# Patient Record
Sex: FEMALE | Race: WHITE | Employment: OTHER | URBAN - METROPOLITAN AREA
[De-identification: names, ages, dates, MRNs, and addresses within clinical notes are randomized per-mention and may not be internally consistent; named-entity substitution may affect disease eponyms.]

---

## 2017-11-08 ENCOUNTER — HOSPITAL ENCOUNTER (OUTPATIENT)
Dept: MAMMOGRAPHY | Age: 77
Discharge: HOME | End: 2017-11-08
Attending: NURSE PRACTITIONER
Payer: MEDICARE

## 2017-11-08 DIAGNOSIS — T14.8XXA: ICD-10-CM

## 2017-11-08 PROCEDURE — 77080 DXA BONE DENSITY AXIAL: CPT

## 2018-03-14 ENCOUNTER — APPOINTMENT (OUTPATIENT)
Dept: CT IMAGING | Age: 78
End: 2018-03-14
Attending: EMERGENCY MEDICINE
Payer: MEDICARE

## 2018-03-14 ENCOUNTER — HOSPITAL ENCOUNTER (EMERGENCY)
Age: 78
Discharge: HOME | End: 2018-03-14
Attending: EMERGENCY MEDICINE
Payer: MEDICARE

## 2018-03-14 VITALS
TEMPERATURE: 98.6 F | HEART RATE: 80 BPM | SYSTOLIC BLOOD PRESSURE: 122 MMHG | OXYGEN SATURATION: 96 % | DIASTOLIC BLOOD PRESSURE: 59 MMHG

## 2018-03-14 DIAGNOSIS — I26.99 OTHER ACUTE PULMONARY EMBOLISM WITHOUT ACUTE COR PULMONALE (HCC): Primary | ICD-10-CM

## 2018-03-14 LAB
ALBUMIN SERPL-MCNC: 3.5 G/DL (ref 3.2–4.6)
ALBUMIN/GLOB SERPL: 1 {RATIO} (ref 1.2–3.5)
ALP SERPL-CCNC: 112 U/L (ref 50–136)
ALT SERPL-CCNC: 22 U/L (ref 12–65)
ANION GAP SERPL CALC-SCNC: 9 MMOL/L (ref 7–16)
AST SERPL-CCNC: 20 U/L (ref 15–37)
ATRIAL RATE: 77 BPM
BASOPHILS # BLD: 0 K/UL (ref 0–0.2)
BASOPHILS NFR BLD: 0 % (ref 0–2)
BILIRUB SERPL-MCNC: 0.3 MG/DL (ref 0.2–1.1)
BUN SERPL-MCNC: 12 MG/DL (ref 8–23)
CALCIUM SERPL-MCNC: 8.3 MG/DL (ref 8.3–10.4)
CALCULATED P AXIS, ECG09: 74 DEGREES
CALCULATED R AXIS, ECG10: 53 DEGREES
CALCULATED T AXIS, ECG11: 41 DEGREES
CHLORIDE SERPL-SCNC: 108 MMOL/L (ref 98–107)
CO2 SERPL-SCNC: 26 MMOL/L (ref 21–32)
CREAT SERPL-MCNC: 0.62 MG/DL (ref 0.6–1)
D DIMER PPP FEU-MCNC: 2.52 UG/ML(FEU)
DIAGNOSIS, 93000: NORMAL
DIFFERENTIAL METHOD BLD: ABNORMAL
EOSINOPHIL # BLD: 0.1 K/UL (ref 0–0.8)
EOSINOPHIL NFR BLD: 1 % (ref 0.5–7.8)
ERYTHROCYTE [DISTWIDTH] IN BLOOD BY AUTOMATED COUNT: 14.5 % (ref 11.9–14.6)
GLOBULIN SER CALC-MCNC: 3.6 G/DL (ref 2.3–3.5)
GLUCOSE SERPL-MCNC: 96 MG/DL (ref 65–100)
HCT VFR BLD AUTO: 42 % (ref 35.8–46.3)
HGB BLD-MCNC: 13.7 G/DL (ref 11.7–15.4)
IMM GRANULOCYTES # BLD: 0 K/UL (ref 0–0.5)
IMM GRANULOCYTES NFR BLD AUTO: 0 % (ref 0–5)
LYMPHOCYTES # BLD: 1.4 K/UL (ref 0.5–4.6)
LYMPHOCYTES NFR BLD: 16 % (ref 13–44)
MCH RBC QN AUTO: 29.1 PG (ref 26.1–32.9)
MCHC RBC AUTO-ENTMCNC: 32.6 G/DL (ref 31.4–35)
MCV RBC AUTO: 89.2 FL (ref 79.6–97.8)
MONOCYTES # BLD: 0.7 K/UL (ref 0.1–1.3)
MONOCYTES NFR BLD: 8 % (ref 4–12)
NEUTS SEG # BLD: 6.6 K/UL (ref 1.7–8.2)
NEUTS SEG NFR BLD: 75 % (ref 43–78)
P-R INTERVAL, ECG05: 168 MS
PLATELET # BLD AUTO: 284 K/UL (ref 150–450)
PMV BLD AUTO: 10.2 FL (ref 10.8–14.1)
POTASSIUM SERPL-SCNC: 3.8 MMOL/L (ref 3.5–5.1)
PROT SERPL-MCNC: 7.1 G/DL (ref 6.3–8.2)
Q-T INTERVAL, ECG07: 372 MS
QRS DURATION, ECG06: 74 MS
QTC CALCULATION (BEZET), ECG08: 420 MS
RBC # BLD AUTO: 4.71 M/UL (ref 4.05–5.25)
SODIUM SERPL-SCNC: 143 MMOL/L (ref 136–145)
TROPONIN I BLD-MCNC: 0 NG/ML (ref 0.02–0.05)
TROPONIN I SERPL-MCNC: <0.02 NG/ML (ref 0.02–0.05)
VENTRICULAR RATE, ECG03: 77 BPM
WBC # BLD AUTO: 8.7 K/UL (ref 4.3–11.1)

## 2018-03-14 PROCEDURE — 80053 COMPREHEN METABOLIC PANEL: CPT | Performed by: EMERGENCY MEDICINE

## 2018-03-14 PROCEDURE — 85025 COMPLETE CBC W/AUTO DIFF WBC: CPT | Performed by: EMERGENCY MEDICINE

## 2018-03-14 PROCEDURE — 84484 ASSAY OF TROPONIN QUANT: CPT | Performed by: EMERGENCY MEDICINE

## 2018-03-14 PROCEDURE — 93005 ELECTROCARDIOGRAM TRACING: CPT | Performed by: EMERGENCY MEDICINE

## 2018-03-14 PROCEDURE — 99283 EMERGENCY DEPT VISIT LOW MDM: CPT | Performed by: EMERGENCY MEDICINE

## 2018-03-14 PROCEDURE — 74011636320 HC RX REV CODE- 636/320: Performed by: EMERGENCY MEDICINE

## 2018-03-14 PROCEDURE — 71260 CT THORAX DX C+: CPT

## 2018-03-14 PROCEDURE — 85379 FIBRIN DEGRADATION QUANT: CPT | Performed by: EMERGENCY MEDICINE

## 2018-03-14 PROCEDURE — 74011000258 HC RX REV CODE- 258: Performed by: EMERGENCY MEDICINE

## 2018-03-14 RX ORDER — SODIUM CHLORIDE 0.9 % (FLUSH) 0.9 %
10 SYRINGE (ML) INJECTION
Status: COMPLETED | OUTPATIENT
Start: 2018-03-14 | End: 2018-03-14

## 2018-03-14 RX ADMIN — Medication 10 ML: at 15:34

## 2018-03-14 RX ADMIN — SODIUM CHLORIDE 100 ML: 900 INJECTION, SOLUTION INTRAVENOUS at 15:34

## 2018-03-14 RX ADMIN — IOPAMIDOL 100 ML: 755 INJECTION, SOLUTION INTRAVENOUS at 15:34

## 2018-03-14 NOTE — DISCHARGE INSTRUCTIONS
Pulmonary Embolism: Care Instructions  Your Care Instructions    Pulmonary embolism is the sudden blockage of an artery in the lung. Blood clots in the deep veins of the leg or pelvis (deep vein thrombosis, or DVT) are the most common cause. These blood clots can travel to the lungs. Pulmonary embolism can be very serious. Because you have had one pulmonary embolism, you are at greater risk for having another one. But you can take steps to prevent another pulmonary embolism by following your doctor's instructions. You will probably take a prescription blood-thinning medicine to prevent blood clots. A blood thinner can stop a blood clot from growing larger and prevent new clots from forming. Follow-up care is a key part of your treatment and safety. Be sure to make and go to all appointments, and call your doctor if you are having problems. It's also a good idea to know your test results and keep a list of the medicines you take. How can you care for yourself at home? · Take your medicines exactly as prescribed. Call your doctor if you think you are having a problem with your medicine. You will get more details on the specific medicines your doctor prescribes. · If you are taking a blood thinner, be sure you get instructions about how to take your medicine safely. Blood thinners can cause serious bleeding problems. Preventing future pulmonary embolisms  · Exercise. Keep blood moving in your legs to keep clots from forming. If you are traveling by car, stop every hour or so. Get out and walk around for a few minutes. If you are traveling by bus, train, or plane, get out of your seat and walk up and down the aisles every hour or so. You also can do leg exercises while you are seated. Pump your feet up and down by pulling your toes up toward your knees then pointing them down. · Get up out of bed as soon as possible after an illness or surgery. · Do not smoke.  If you need help quitting, talk to your doctor about stop-smoking programs and medicines. These can increase your chances of quitting for good. · Check with your doctor before taking hormone or birth control pills. These may increase your risk of blot clots. · Ask your doctor about wearing compression stockings to help prevent blood clots in your legs. There are different types of stockings, and they need to fit right. So your doctor will recommend what you need. When should you call for help? Call 911 anytime you think you may need emergency care. For example, call if:  ? · You have shortness of breath. ? · You have chest pain. ? · You passed out (lost consciousness). ? · You cough up blood. ?Call your doctor now or seek immediate medical care if:  ? · You have new or worsening pain or swelling in your leg. ? Watch closely for changes in your health, and be sure to contact your doctor if:  ? · You do not get better as expected. Where can you learn more? Go to http://vicky-enid.info/. Enter H165 in the search box to learn more about \"Pulmonary Embolism: Care Instructions. \"  Current as of: March 20, 2017  Content Version: 11.4  © 4799-0463 [employer]. Care instructions adapted under license by [employer] (which disclaims liability or warranty for this information). If you have questions about a medical condition or this instruction, always ask your healthcare professional. [address] any warranty or liability for your use of this information.

## 2018-03-14 NOTE — ED TRIAGE NOTES
Pt arrived ambulatory via POV with c/o chest pain when she breaths that started yesterday at 5pm. Pain is under L breast, does not radiate. Pt denies any other systems.

## 2018-03-14 NOTE — ED PROVIDER NOTES
HPI Comments: 68-year-old female with history of sick sinus syndrome and pacemaker placement presents with left-sided chest pain since 5:30 PM last night. She states pain woke her up several times throughout the night. It improved after taking Motrin. Pain is constant with intermittent worsening. It has improved this morning. She denies shortness of breath, cough, fever, hemoptysis. No leg swelling or history of clots. No history of MI or stents. Pain worse with breathing. No change in pain with movement or palpation. Has had similar pain in the past and told it was due to spasms in her chest wall muscles. Went to [facility] for this this morning was told to come to the emergency department for further evaluation. Patient is a 68 y.o. female presenting with chest pain. The history is provided by the patient. Chest Pain (Angina)    Pertinent negatives include no abdominal pain, no back pain, no cough, no fever, no headaches, no nausea, no palpitations, no shortness of breath, no vomiting and no weakness.         Past Medical History:   Diagnosis Date    Atrial fibrillation ([address] 12/2/2014    Back pain 12/15/2014    CAD (coronary artery disease)     a-fib    Cardiac pacemaker 12/15/2014    Hypothyroidism     was previously on medication but it has been stopped    Insomnia 12/2/2014    Malaise and fatigue 12/21/2015    Oral thrush 9/16/2015    Pericardial effusion 12/15/2014    Pleuritic chest pain 1/8/2015    SSS (sick sinus syndrome) ([address] 12/21/2015       Past Surgical History:   Procedure Laterality Date    HX APPENDECTOMY      HX COLONOSCOPY  04/23/2007    HX HYSTERECTOMY      HX PACEMAKER  12/15/2014    HX PACEMAKER PLACEMENT  12/2014         Family History:   Problem Relation Age of Onset    Cancer Mother      leukemia    Heart Disease Father     Stroke Father     Cancer Brother      prostate       Social History     Social History    Marital status:      Spouse name: N/A  Number of children: N/A    Years of education: N/A     Occupational History    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 Lives with . 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 ALLERGIES: Review of patient's allergies indicates no known allergies. Review of Systems   Constitutional: Negative for chills and fever. HENT: Negative for hearing loss. Eyes: Negative for visual disturbance. Respiratory: Negative for cough and shortness of breath. Cardiovascular: Positive for chest pain. Negative for palpitations and leg swelling. Gastrointestinal: Negative for abdominal pain, diarrhea, nausea and vomiting. Musculoskeletal: Negative for back pain. Skin: Negative for rash. Neurological: Negative for weakness and headaches. Psychiatric/Behavioral: Negative for confusion. There were no vitals filed for this visit. Physical Exam   Constitutional: She appears well-developed and well-nourished. HENT:   Head: Normocephalic and atraumatic. Right Ear: External ear normal.   Left Ear: External ear normal.   Nose: Nose normal.   Mouth/Throat: Oropharynx is clear and moist.   Eyes: Conjunctivae are normal. Pupils are equal, round, and reactive to light. Neck: Normal range of motion. Neck supple. Cardiovascular: Regular rhythm, normal heart sounds and intact distal pulses. Pulmonary/Chest: Effort normal and breath sounds normal. No respiratory distress. She has no wheezes. She exhibits no tenderness. Abdominal: Soft.  Bowel sounds are normal. She exhibits no distension. There is no tenderness. Musculoskeletal: Normal range of motion. She exhibits no edema. Neurological: She is alert. Skin: Skin is warm and dry. Psychiatric: Judgment normal.   Nursing note and vitals reviewed. MDM  Number of Diagnoses or Management Options  Diagnosis management comments: Parts of this document were created using dragon voice recognition software. The chart has been reviewed but errors may still be present. cxr from MD Fonseca reviewed. No acute findings. 2:45 PM  D dimer elevated. CTA pending.        Amount and/or Complexity of Data Reviewed  Clinical lab tests: ordered and reviewed (Results for orders placed or performed during the hospital encounter of 03/14/18  -CBC WITH AUTOMATED DIFF       Result                                            Value                         Ref Range                       WBC                                               8.7                           4.3 - 11.1 K/uL                 RBC                                               4.71                          4.05 - 5.25 M/uL                HGB                                               13.7                          11.7 - 15.4 g/dL                HCT                                               42.0                          35.8 - 46.3 %                   MCV                                               89.2                          79.6 - 97.8 FL                  MCH                                               29.1                          26.1 - 32.9 PG                  MCHC                                              32.6                          31.4 - 35.0 g/dL                RDW                                               14.5                          11.9 - 14.6 %                   PLATELET                                          284                           150 - 450 K/uL                  MPV                                               10.2 (L) 10.8 - 14.1 FL                  DF                                                AUTOMATED                                                     NEUTROPHILS                                       75                            43 - 78 %                       LYMPHOCYTES                                       16                            13 - 44 %                       MONOCYTES                                         8                             4.0 - 12.0 %                    EOSINOPHILS                                       1                             0.5 - 7.8 %                     BASOPHILS                                         0                             0.0 - 2.0 %                     IMMATURE GRANULOCYTES                             0                             0.0 - 5.0 %                     ABS. NEUTROPHILS                                  6.6                           1.7 - 8.2 K/UL                  ABS. LYMPHOCYTES                                  1.4                           0.5 - 4.6 K/UL                  ABS. MONOCYTES                                    0.7                           0.1 - 1.3 K/UL                  ABS. EOSINOPHILS                                  0.1                           0.0 - 0.8 K/UL                  ABS. BASOPHILS                                    0.0                           0.0 - 0.2 K/UL                  ABS. IMM.  GRANS.                                  0.0                           0.0 - 0.5 K/UL             -METABOLIC PANEL, COMPREHENSIVE       Result                                            Value                         Ref Range                       Sodium                                            143                           136 - 145 mmol/L                Potassium                                         3.8                           3.5 - 5.1 mmol/L                Chloride                                          108 (H)                       98 - 107 mmol/L                 CO2                                               26                            21 - 32 mmol/L                  Anion gap                                         9                             7 - 16 mmol/L                   Glucose                                           96                            65 - 100 mg/dL                  BUN                                               12                            8 - 23 MG/DL                    Creatinine                                        0.62                          0.6 - 1.0 MG/DL                 GFR est AA                                        >60                           >60 ml/min/1.73m2               GFR est non-AA                                    >60                           >60 ml/min/1.73m2               Calcium                                           8.3                           8.3 - 10.4 MG/DL                Bilirubin, total                                  0.3                           0.2 - 1.1 MG/DL                 ALT (SGPT)                                        22                            12 - 65 U/L                     AST (SGOT)                                        20                            15 - 37 U/L                     Alk. phosphatase                                  112                           50 - 136 U/L                    Protein, total                                    7.1                           6.3 - 8.2 g/dL                  Albumin                                           3.5                           3.2 - 4.6 g/dL                  Globulin                                          3.6 (H)                       2.3 - 3.5 g/dL                  A-G Ratio                                         1.0 (L)                       1.2 - 3.5                  -TROPONIN I       Result                                            Value                         Ref Range Troponin-I, Qt.                                   <0.02 (L)                     0.02 - 0.05 NG/ML          -D DIMER       Result                                            Value                         Ref Range                       D DIMER                                           2.52 (HH)                     <0.56 ug/ml(FEU)           -EKG, 12 LEAD, INITIAL       Result                                            Value                         Ref Range                       Ventricular Rate                                  77                            BPM                             Atrial Rate                                       77                            BPM                             P-R Interval                                      168                           ms                              QRS Duration                                      74                            ms                              Q-T Interval                                      372                           ms                              QTC Calculation (Bezet)                           420                           ms                              Calculated P Axis                                 74                            degrees                         Calculated R Axis                                 53                            degrees                         Calculated T Axis                                 41                            degrees                         Diagnosis                                                                                                   Normal sinus rhythm   Cannot rule out Anterior infarct , age undetermined   Abnormal ECG   When compared with ECG of 31-DEC-2014 00:32,   Nonspecific T wave abnormality no longer evident in Anterolateral leads   Confirmed by Osei Rojo MD (), [facility] ([id number]) on 3/14/2018 12:35:15 PM     )  Tests in the radiology section of CPT®: reviewed and ordered          ED Course       Procedures      Transfer patient care change of shift, this is a 49-year-old female patient complaining of pleuritic type chest pain. Her initial laboratory evaluation EKG and chest x-ray were unremarkable however her d-dimer level was elevated. I then asked to follow-up results of patient's CTA of the chest and repeat troponin. Per outgoing provider, low suspicion for cardiac abnormality as a cause of patient's pain. She is has experienced similar symptoms in the past secondary to musculoskeletal cause per report.

## 2018-03-16 PROBLEM — J47.9 BRONCHIECTASIS (HCC): Status: ACTIVE | Noted: 2018-03-16

## 2018-03-16 PROBLEM — I82.90 VTE (VENOUS THROMBOEMBOLISM): Status: ACTIVE | Noted: 2018-03-16

## 2018-03-23 ENCOUNTER — HOSPITAL ENCOUNTER (OUTPATIENT)
Age: 78
Setting detail: OUTPATIENT SURGERY
Discharge: HOME | End: 2018-03-23
Attending: INTERNAL MEDICINE | Admitting: INTERNAL MEDICINE
Payer: MEDICARE

## 2018-03-23 VITALS
DIASTOLIC BLOOD PRESSURE: 56 MMHG | HEART RATE: 87 BPM | RESPIRATION RATE: 18 BRPM | WEIGHT: 100 LBS | HEIGHT: 60 IN | BODY MASS INDEX: 19.63 KG/M2 | TEMPERATURE: 97.8 F | SYSTOLIC BLOOD PRESSURE: 112 MMHG | OXYGEN SATURATION: 94 %

## 2018-03-23 DIAGNOSIS — J47.0 BRONCHIECTASIS WITH ACUTE LOWER RESPIRATORY INFECTION (HCC): ICD-10-CM

## 2018-03-23 LAB
APPEARANCE FLD: NORMAL
COLOR FLD: NORMAL
LYMPHOCYTES NFR FLD: 10 %
MONOS+MACROS NFR FLD: 2 %
NEUTROPHILS NFR FLD: 88 %
NUC CELL # FLD: 424 /CU MM
RBC # FLD: NORMAL /CU MM
SPECIMEN SOURCE FLD: NORMAL

## 2018-03-23 PROCEDURE — 88112 CYTOPATH CELL ENHANCE TECH: CPT | Performed by: INTERNAL MEDICINE

## 2018-03-23 PROCEDURE — 74011250636 HC RX REV CODE- 250/636: Performed by: INTERNAL MEDICINE

## 2018-03-23 PROCEDURE — 88305 TISSUE EXAM BY PATHOLOGIST: CPT | Performed by: INTERNAL MEDICINE

## 2018-03-23 PROCEDURE — 87486 CHLMYD PNEUM DNA AMP PROBE: CPT | Performed by: INTERNAL MEDICINE

## 2018-03-23 PROCEDURE — 31645 BRNCHSC W/THER ASPIR 1ST: CPT | Performed by: INTERNAL MEDICINE

## 2018-03-23 PROCEDURE — 76040000025: Performed by: INTERNAL MEDICINE

## 2018-03-23 PROCEDURE — 87633 RESP VIRUS 12-25 TARGETS: CPT | Performed by: INTERNAL MEDICINE

## 2018-03-23 PROCEDURE — 87116 MYCOBACTERIA CULTURE: CPT | Performed by: INTERNAL MEDICINE

## 2018-03-23 PROCEDURE — 87102 FUNGUS ISOLATION CULTURE: CPT | Performed by: INTERNAL MEDICINE

## 2018-03-23 PROCEDURE — 87070 CULTURE OTHR SPECIMN AEROBIC: CPT | Performed by: INTERNAL MEDICINE

## 2018-03-23 PROCEDURE — 74011000250 HC RX REV CODE- 250: Performed by: INTERNAL MEDICINE

## 2018-03-23 PROCEDURE — 89050 BODY FLUID CELL COUNT: CPT | Performed by: INTERNAL MEDICINE

## 2018-03-23 PROCEDURE — 74011250636 HC RX REV CODE- 250/636

## 2018-03-23 PROCEDURE — 77030012699 HC VLV SUC CNTRL OCOA -A: Performed by: INTERNAL MEDICINE

## 2018-03-23 RX ORDER — NALOXONE HYDROCHLORIDE 0.4 MG/ML
0.4 INJECTION, SOLUTION INTRAMUSCULAR; INTRAVENOUS; SUBCUTANEOUS
Status: DISCONTINUED | OUTPATIENT
Start: 2018-03-23 | End: 2018-03-23 | Stop reason: HOSPADM

## 2018-03-23 RX ORDER — MIDAZOLAM HYDROCHLORIDE 1 MG/ML
.25-5 INJECTION, SOLUTION INTRAMUSCULAR; INTRAVENOUS
Status: DISCONTINUED | OUTPATIENT
Start: 2018-03-23 | End: 2018-03-23 | Stop reason: HOSPADM

## 2018-03-23 RX ORDER — SODIUM CHLORIDE 0.9 % (FLUSH) 0.9 %
5-10 SYRINGE (ML) INJECTION AS NEEDED
Status: DISCONTINUED | OUTPATIENT
Start: 2018-03-23 | End: 2018-03-23 | Stop reason: HOSPADM

## 2018-03-23 RX ORDER — LIDOCAINE HYDROCHLORIDE 40 MG/ML
SOLUTION TOPICAL AS NEEDED
Status: DISCONTINUED | OUTPATIENT
Start: 2018-03-23 | End: 2018-03-23 | Stop reason: HOSPADM

## 2018-03-23 RX ORDER — FENTANYL CITRATE 50 UG/ML
50 INJECTION, SOLUTION INTRAMUSCULAR; INTRAVENOUS
Status: DISCONTINUED | OUTPATIENT
Start: 2018-03-23 | End: 2018-03-23 | Stop reason: HOSPADM

## 2018-03-23 RX ORDER — DIPHENHYDRAMINE HYDROCHLORIDE 50 MG/ML
25-50 INJECTION, SOLUTION INTRAMUSCULAR; INTRAVENOUS
Status: DISCONTINUED | OUTPATIENT
Start: 2018-03-23 | End: 2018-03-23 | Stop reason: HOSPADM

## 2018-03-23 RX ORDER — SODIUM CHLORIDE 9 MG/ML
50 INJECTION, SOLUTION INTRAVENOUS CONTINUOUS
Status: DISCONTINUED | OUTPATIENT
Start: 2018-03-23 | End: 2018-03-23 | Stop reason: HOSPADM

## 2018-03-23 RX ORDER — FLUMAZENIL 0.1 MG/ML
0.2 INJECTION INTRAVENOUS
Status: DISCONTINUED | OUTPATIENT
Start: 2018-03-23 | End: 2018-03-23 | Stop reason: HOSPADM

## 2018-03-23 RX ORDER — SODIUM CHLORIDE 0.9 % (FLUSH) 0.9 %
5-10 SYRINGE (ML) INJECTION EVERY 8 HOURS
Status: DISCONTINUED | OUTPATIENT
Start: 2018-03-23 | End: 2018-03-23 | Stop reason: HOSPADM

## 2018-03-23 RX ORDER — LIDOCAINE HYDROCHLORIDE 20 MG/ML
JELLY TOPICAL AS NEEDED
Status: DISCONTINUED | OUTPATIENT
Start: 2018-03-23 | End: 2018-03-23 | Stop reason: HOSPADM

## 2018-03-23 RX ADMIN — LIDOCAINE HYDROCHLORIDE: 40 SOLUTION TOPICAL at 12:29

## 2018-03-23 RX ADMIN — MIDAZOLAM HYDROCHLORIDE 2 MG: 1 INJECTION, SOLUTION INTRAMUSCULAR; INTRAVENOUS at 12:38

## 2018-03-23 RX ADMIN — LIDOCAINE HYDROCHLORIDE: 20 JELLY TOPICAL at 12:29

## 2018-03-23 RX ADMIN — FENTANYL CITRATE 50 MCG: 50 INJECTION, SOLUTION INTRAMUSCULAR; INTRAVENOUS at 12:38

## 2018-03-23 RX ADMIN — FENTANYL CITRATE 50 MCG: 50 INJECTION, SOLUTION INTRAMUSCULAR; INTRAVENOUS at 12:40

## 2018-03-23 RX ADMIN — SODIUM CHLORIDE 50 ML/HR: 900 INJECTION, SOLUTION INTRAVENOUS at 12:29

## 2018-03-23 NOTE — H&P
Date of Surgery Update:  Montana Araiza was seen and examined. History and physical has been reviewed. The patient has been examined.  There have been no significant clinical changes since the completion of the originally dated History and Physical.    Signed By: Hao Weiss MD     March 23, 2018 12:46 PM

## 2018-03-23 NOTE — DISCHARGE INSTRUCTIONS
RESPIRATORY CARE - BRONCHOSCOPY - DISCHARGE INSTRUCTIONS      You received a lot of numbing medication for your throat and nose, and you also received medication to make you sleepy during your procedure. Because of this and because the bronchoscopy may have irritated your airways, we ask that you follow these directions:    1. Do not eat or drink until 1:42 p.m. After that, you may have what you please. You may want to try some liquids first, because your throat may be a little sore. 2. Medication may cause drowsiness for several hours, therefore, do not drive or                  operate machinery for remainder of the day. No alcohol today. 3. You may cough up more mucus than usual and you may see some blood, but                   this is expected and should subside by the following day. 4. If severe throat irritation, coughing, or bleeding continue, call your doctor. 5.         If you run a fever greater than 102, call [facility] at [phone].       Instructions given to Michelle Arvizu and other family

## 2018-03-23 NOTE — PROCEDURES
PROCEDURE    Bronchoscopy with airway inspection/cleanout/BAL. TBBX/EBBX. INDICATION   bronchiectasis    EQUIPMENT:    Olympus Q 180 Bronchhoscope. ANESTHESIA    Concious sedation with: Fentanyl 100  mcg IV; Versed 2  mg IV; Lidocaine 120 mg to tracheo-bronchial tree and vocal cords;     AIRWAY INSPECTION    After obtaining informed consent, using a bite block/ET tube adapter, an Olympus q 180 video/fiberoptic bronchoscope was  introduced into the trachea through the vocal chords/ET tube, without complication. RIGHT    LOCATION NORM/ABNORM DESCRIPTION   VOCAL CORDS NL    TRACHEA NL    KIESHA NL    RMSB     RUL  Diffuse acute inflammation with edema, erythema and increased friability. BI     RML  arge amount of purulent secretions removed   SUP SEGM RLL     MED BASAL     ANTERIOR BASAL  Bal rml with 120 ml saline sent to lab   LATERAL BASAL     POSTERIOR BASAL                                               LEFT    LOCATION NORMAL/ABNORMAL TYPE   LMSB     SYLVIE     LINGULA     SUPERIOR DIVISION     SUPERIOR SEG LLL     OLGA-MEDIAL LLL     LATERAL LLL     POSTERIOR LLL       The following samples were obtained:    BAL:  Bronchial Wash:    Samples were sent for:  Cultures, cell count, cytology  The procedure was completed  without complication and the patient tolerated the procedure well.   Estimated blood loss-  0 to minimum  Recommendations:  Call next week for results  Rachel Vasquez MD

## 2018-03-23 NOTE — IP AVS SNAPSHOT
Sri Noble 
 
 
 [address] [facility] 
[phone] Patient: Addison Harkins MRN: [id number] NZO:[id number] About your hospitalization You were admitted on:  March 23, 2018 You last received care in the:  [facility] You were discharged on:  March 23, 2018 Why you were hospitalized Your primary diagnosis was:  Not on File Follow-up Information None Your Scheduled Appointments Tuesday April 10, 2018  4:00 PM EDT  
(Arrive by 3:40 PM) Return appointment with Kody Webber MD  
[facility] Pulmonary and Critical Care ([facility]) [address] [facility]  
[phone] Wednesday April 11, 2018  9:15 AM EDT  
REMOTE DEVICE CHECK ORDERS ONLY ENCOUNTER with JAMAL REMOTE PACER [address] [facility] [email or web address]] CARDIOLOGY [place] OFFICE ([address] [place] [address]) 2 [facility] 
[address] [facility] [address]  
[phone] Please remember THIS REMOTE CHECK IS COMPLETED FROM HOME - YOU WILL NOT COME TO THE OFFICE FOR THIS APPOINTMENT. In preparation for this check, please ensure your monitor box is working appropriately. If your monitor requires you to send a transmission, please make sure it is sent by 11:00AM on this day so we can have it processed and resulted to your doctor without delay. If you have a question or problem with the monitor box, please contact your respective company:   [address] [place] [address]/[employer] - [phone]  [employer]/Home [employer] - [phone]  [employer] - [phone]  [employer] - [phone]  If you have any further questions or need to move this appointment, we are happy to help and can be reached at [phone]. Discharge Orders None A check alexus indicates which time of day the medication should be taken. My Medications ASK your doctor about these medications Instructions Each Dose to Equal  
 Morning Noon Evening Bedtime  
 rivaroxaban 15 mg Tab tablet Commonly known as:  Gerrymariola Kee Your last dose was: Your next dose is:    
   
   
 1 tab bid Discharge Instructions RESPIRATORY CARE - BRONCHOSCOPY - DISCHARGE INSTRUCTIONS You received a lot of numbing medication for your throat and nose, and you also received medication to make you sleepy during your procedure. Because of this and because the bronchoscopy may have irritated your airways, we ask that you follow these directions: 1. Do not eat or drink until 1:42 p.m. After that, you may have what you please. You may want to try some liquids first, because your throat may be a little sore. 2. Medication may cause drowsiness for several hours, therefore, do not drive or                  operate machinery for remainder of the day. No alcohol today. 3. You may cough up more mucus than usual and you may see some blood, but                   this is expected and should subside by the following day. 4. If severe throat irritation, coughing, or bleeding continue, call your doctor. 5.         If you run a fever greater than 102, call [facility] at [phone]. Instructions given to Ángel Dumont and other family [facility]! Dear Nu Florian: Thank you for requesting a [email or web address] account. Our records indicate that you already have an active [email or web address] account. You can access your account anytime at [email or web address] Did you know that you can access your hospital and ER discharge instructions at any time in [email or web address]? You can also review all of your test results from your hospital stay or ER visit. Additional Information If you have questions, please visit the Frequently Asked Questions section of the [email or web address] website at [email or web address] Remember, MyChart is NOT to be used for urgent needs. For medical emergencies, dial 911. Now available from your iPhone and Android! Providers Seen During Your Hospitalization Provider Specialty Primary office phone Jenny Tate MD Pulmonary Disease [phone] Your Primary Care Physician (PCP) Primary Care Physician Office Phone Office Fax [address], [address] [place] [address] [phone] You are allergic to the following No active allergies Recent Documentation Height Weight BMI OB Status Smoking Status 1.524 m 45.4 kg 19.53 kg/m2 Hysterectomy Never Smoker Emergency Contacts Name Discharge Info Relation Home Work Mobile TeodoraRoger DISCHARGE CAREGIVER [3] Spouse [3] [phone] Patient Belongings The following personal items are in your possession at time of discharge: 
  Dental Appliances: None  Visual Aid: None Please provide this summary of care documentation to your next provider. Signatures-by signing, you are acknowledging that this After Visit Summary has been reviewed with you and you have received a copy. Patient Signature:  ____________________________________________________________ Date:  ____________________________________________________________  
  
Marlyse Leaks Provider Signature:  ____________________________________________________________ Date:  ____________________________________________________________

## 2018-03-25 LAB
BACTERIA SPEC CULT: NORMAL
GRAM STN SPEC: NORMAL
SERVICE CMNT-IMP: NORMAL

## 2018-03-26 LAB
BACTERIA SPEC CULT: ABNORMAL
BACTERIA SPEC CULT: ABNORMAL
FLUAV RNA SPEC QL NAA+PROBE: NEGATIVE
FLUBV RNA SPEC QL NAA+PROBE: NEGATIVE
GRAM STN SPEC: ABNORMAL
HADV DNA SPEC QL NAA+PROBE: NEGATIVE
HMPV RNA SPEC QL NAA+PROBE: NEGATIVE
HPIV1 RNA SPEC QL NAA+PROBE: NEGATIVE
HPIV2 RNA SPEC QL NAA+PROBE: NEGATIVE
HPIV3 RNA SPEC QL NAA+PROBE: NEGATIVE
RHINOVIRUS RNA SPEC QL NAA+PROBE: NEGATIVE
RSV A RNA SPEC QL NAA+PROBE: NEGATIVE
RSV B RNA SPEC QL NAA+PROBE: NEGATIVE
SERVICE CMNT-IMP: ABNORMAL
SPECIMEN SOURCE: NORMAL

## 2018-03-26 NOTE — PROGRESS NOTES
I informed the patient. The patient has difficulty with \"large pills\".  I informed Dr. Xavier Ortiz and he changed the medicine to:  cefUROXime (CEFTIN) 125 mg/5 mL suspension   250 mg solution bid for 10 days

## 2018-03-27 LAB
C. PNEUMONAIE, CPPT: NOT DETECTED
M. PNEUMONIAE, MPPT: NOT DETECTED

## 2018-04-20 LAB
FUNGUS CULTURE, RFCO2T: NEGATIVE
FUNGUS SMEAR, RFCO1T: NORMAL
FUNGUS SPEC CULT: NORMAL
FUNGUS STAIN, 188244: NORMAL
REFLEX TO ID, RFCO3T: NORMAL
SPECIMEN SOURCE: NORMAL
SPECIMEN SOURCE: NORMAL

## 2018-05-07 LAB
ACID FAST STN SPEC: NEGATIVE
ACID FAST STN SPEC: NEGATIVE
MYCOBACTERIUM SPEC QL CULT: NEGATIVE
MYCOBACTERIUM SPEC QL CULT: NEGATIVE
SPECIMEN PREPARATION: NORMAL
SPECIMEN PREPARATION: NORMAL
SPECIMEN SOURCE: NORMAL
SPECIMEN SOURCE: NORMAL

## 2020-01-13 PROBLEM — R00.1 BRADYCARDIA: Status: ACTIVE | Noted: 2020-01-13

## 2020-07-14 ENCOUNTER — APPOINTMENT (OUTPATIENT)
Dept: GENERAL RADIOLOGY | Age: 80
DRG: 310 | End: 2020-07-14
Attending: EMERGENCY MEDICINE
Payer: MEDICARE

## 2020-07-14 ENCOUNTER — HOSPITAL ENCOUNTER (INPATIENT)
Age: 80
LOS: 1 days | Discharge: HOME | DRG: 310 | End: 2020-07-15
Attending: EMERGENCY MEDICINE | Admitting: INTERNAL MEDICINE
Payer: MEDICARE

## 2020-07-14 DIAGNOSIS — I48.0 PAROXYSMAL ATRIAL FIBRILLATION WITH RVR (HCC): Primary | ICD-10-CM

## 2020-07-14 PROBLEM — R00.2 PALPITATIONS: Status: ACTIVE | Noted: 2020-07-14

## 2020-07-14 PROBLEM — E03.9 ACQUIRED HYPOTHYROIDISM: Status: ACTIVE | Noted: 2020-07-14

## 2020-07-14 PROBLEM — I48.91 ATRIAL FIBRILLATION WITH RVR (HCC): Status: ACTIVE | Noted: 2020-07-14

## 2020-07-14 LAB
ALBUMIN SERPL-MCNC: 3.4 G/DL (ref 3.2–4.6)
ALBUMIN/GLOB SERPL: 1.1 {RATIO} (ref 1.2–3.5)
ALP SERPL-CCNC: 99 U/L (ref 50–136)
ALT SERPL-CCNC: 22 U/L (ref 12–65)
ANION GAP SERPL CALC-SCNC: 6 MMOL/L (ref 7–16)
AST SERPL-CCNC: 17 U/L (ref 15–37)
ATRIAL RATE: 202 BPM
ATRIAL RATE: 300 BPM
BASOPHILS # BLD: 0 K/UL (ref 0–0.2)
BASOPHILS NFR BLD: 1 % (ref 0–2)
BILIRUB SERPL-MCNC: 0.3 MG/DL (ref 0.2–1.1)
BUN SERPL-MCNC: 12 MG/DL (ref 8–23)
CALCIUM SERPL-MCNC: 8.2 MG/DL (ref 8.3–10.4)
CALCULATED R AXIS, ECG10: 81 DEGREES
CALCULATED R AXIS, ECG10: 83 DEGREES
CALCULATED T AXIS, ECG11: 23 DEGREES
CALCULATED T AXIS, ECG11: 44 DEGREES
CHLORIDE SERPL-SCNC: 115 MMOL/L (ref 98–107)
CO2 SERPL-SCNC: 24 MMOL/L (ref 21–32)
CREAT SERPL-MCNC: 0.66 MG/DL (ref 0.6–1)
DIAGNOSIS, 93000: NORMAL
DIAGNOSIS, 93000: NORMAL
DIFFERENTIAL METHOD BLD: ABNORMAL
EOSINOPHIL # BLD: 0.1 K/UL (ref 0–0.8)
EOSINOPHIL NFR BLD: 2 % (ref 0.5–7.8)
ERYTHROCYTE [DISTWIDTH] IN BLOOD BY AUTOMATED COUNT: 14.3 % (ref 11.9–14.6)
GLOBULIN SER CALC-MCNC: 3.2 G/DL (ref 2.3–3.5)
GLUCOSE SERPL-MCNC: 115 MG/DL (ref 65–100)
HCT VFR BLD AUTO: 41.8 % (ref 35.8–46.3)
HGB BLD-MCNC: 12.9 G/DL (ref 11.7–15.4)
IMM GRANULOCYTES # BLD AUTO: 0 K/UL (ref 0–0.5)
IMM GRANULOCYTES NFR BLD AUTO: 0 % (ref 0–5)
LYMPHOCYTES # BLD: 1.6 K/UL (ref 0.5–4.6)
LYMPHOCYTES NFR BLD: 28 % (ref 13–44)
MAGNESIUM SERPL-MCNC: 2.2 MG/DL (ref 1.8–2.4)
MCH RBC QN AUTO: 27.7 PG (ref 26.1–32.9)
MCHC RBC AUTO-ENTMCNC: 30.9 G/DL (ref 31.4–35)
MCV RBC AUTO: 89.7 FL (ref 79.6–97.8)
MONOCYTES # BLD: 0.7 K/UL (ref 0.1–1.3)
MONOCYTES NFR BLD: 12 % (ref 4–12)
NEUTS SEG # BLD: 3.2 K/UL (ref 1.7–8.2)
NEUTS SEG NFR BLD: 57 % (ref 43–78)
NRBC # BLD: 0 K/UL (ref 0–0.2)
PLATELET # BLD AUTO: 298 K/UL (ref 150–450)
PMV BLD AUTO: 10.5 FL (ref 9.4–12.3)
POTASSIUM SERPL-SCNC: 4.2 MMOL/L (ref 3.5–5.1)
PROT SERPL-MCNC: 6.6 G/DL (ref 6.3–8.2)
Q-T INTERVAL, ECG07: 260 MS
Q-T INTERVAL, ECG07: 318 MS
QRS DURATION, ECG06: 70 MS
QRS DURATION, ECG06: 72 MS
QTC CALCULATION (BEZET), ECG08: 386 MS
QTC CALCULATION (BEZET), ECG08: 399 MS
RBC # BLD AUTO: 4.66 M/UL (ref 4.05–5.2)
SODIUM SERPL-SCNC: 145 MMOL/L (ref 136–145)
T4 FREE SERPL-MCNC: 1 NG/DL (ref 0.78–1.46)
TROPONIN-HIGH SENSITIVITY: 8.7 PG/ML (ref 0–14)
TSH SERPL DL<=0.005 MIU/L-ACNC: 3.36 UIU/ML (ref 0.36–3.74)
VENTRICULAR RATE, ECG03: 142 BPM
VENTRICULAR RATE, ECG03: 89 BPM
WBC # BLD AUTO: 5.7 K/UL (ref 4.3–11.1)

## 2020-07-14 PROCEDURE — 71045 X-RAY EXAM CHEST 1 VIEW: CPT

## 2020-07-14 PROCEDURE — 83735 ASSAY OF MAGNESIUM: CPT

## 2020-07-14 PROCEDURE — 65660000000 HC RM CCU STEPDOWN

## 2020-07-14 PROCEDURE — 84484 ASSAY OF TROPONIN QUANT: CPT

## 2020-07-14 PROCEDURE — 74011250636 HC RX REV CODE- 250/636: Performed by: EMERGENCY MEDICINE

## 2020-07-14 PROCEDURE — 93005 ELECTROCARDIOGRAM TRACING: CPT | Performed by: EMERGENCY MEDICINE

## 2020-07-14 PROCEDURE — 84443 ASSAY THYROID STIM HORMONE: CPT

## 2020-07-14 PROCEDURE — 85025 COMPLETE CBC W/AUTO DIFF WBC: CPT

## 2020-07-14 PROCEDURE — 80053 COMPREHEN METABOLIC PANEL: CPT

## 2020-07-14 PROCEDURE — 74011000258 HC RX REV CODE- 258: Performed by: EMERGENCY MEDICINE

## 2020-07-14 PROCEDURE — 74011250637 HC RX REV CODE- 250/637: Performed by: NURSE PRACTITIONER

## 2020-07-14 PROCEDURE — 74011000250 HC RX REV CODE- 250: Performed by: EMERGENCY MEDICINE

## 2020-07-14 PROCEDURE — 96365 THER/PROPH/DIAG IV INF INIT: CPT

## 2020-07-14 PROCEDURE — 84439 ASSAY OF FREE THYROXINE: CPT

## 2020-07-14 PROCEDURE — 99285 EMERGENCY DEPT VISIT HI MDM: CPT

## 2020-07-14 PROCEDURE — 96375 TX/PRO/DX INJ NEW DRUG ADDON: CPT

## 2020-07-14 PROCEDURE — 74011250637 HC RX REV CODE- 250/637: Performed by: INTERNAL MEDICINE

## 2020-07-14 RX ORDER — PROPAFENONE HYDROCHLORIDE 150 MG/1
300 TABLET, FILM COATED ORAL ONCE
Status: COMPLETED | OUTPATIENT
Start: 2020-07-14 | End: 2020-07-14

## 2020-07-14 RX ORDER — SODIUM CHLORIDE 0.9 % (FLUSH) 0.9 %
5-40 SYRINGE (ML) INJECTION AS NEEDED
Status: DISCONTINUED | OUTPATIENT
Start: 2020-07-14 | End: 2020-07-15 | Stop reason: HOSPADM

## 2020-07-14 RX ORDER — ACETAMINOPHEN 325 MG/1
650 TABLET ORAL
Status: DISCONTINUED | OUTPATIENT
Start: 2020-07-14 | End: 2020-07-15 | Stop reason: HOSPADM

## 2020-07-14 RX ORDER — SODIUM CHLORIDE 0.9 % (FLUSH) 0.9 %
5-40 SYRINGE (ML) INJECTION EVERY 8 HOURS
Status: DISCONTINUED | OUTPATIENT
Start: 2020-07-14 | End: 2020-07-15 | Stop reason: HOSPADM

## 2020-07-14 RX ORDER — NITROGLYCERIN 0.4 MG/1
0.4 TABLET SUBLINGUAL
Status: DISCONTINUED | OUTPATIENT
Start: 2020-07-14 | End: 2020-07-15 | Stop reason: HOSPADM

## 2020-07-14 RX ORDER — DILTIAZEM HYDROCHLORIDE 5 MG/ML
10 INJECTION INTRAVENOUS ONCE
Status: COMPLETED | OUTPATIENT
Start: 2020-07-14 | End: 2020-07-14

## 2020-07-14 RX ORDER — METOPROLOL TARTRATE 50 MG/1
50 TABLET ORAL 2 TIMES DAILY
Status: DISCONTINUED | OUTPATIENT
Start: 2020-07-14 | End: 2020-07-15

## 2020-07-14 RX ADMIN — SODIUM CHLORIDE 5 MG/HR: 900 INJECTION, SOLUTION INTRAVENOUS at 11:39

## 2020-07-14 RX ADMIN — SODIUM CHLORIDE 500 ML: 900 INJECTION, SOLUTION INTRAVENOUS at 11:39

## 2020-07-14 RX ADMIN — APIXABAN 2.5 MG: 2.5 TABLET, FILM COATED ORAL at 15:00

## 2020-07-14 RX ADMIN — APIXABAN 2.5 MG: 2.5 TABLET, FILM COATED ORAL at 23:24

## 2020-07-14 RX ADMIN — METOPROLOL TARTRATE 50 MG: 50 TABLET, FILM COATED ORAL at 18:05

## 2020-07-14 RX ADMIN — DILTIAZEM HYDROCHLORIDE 10 MG: 5 INJECTION, SOLUTION INTRAVENOUS at 11:12

## 2020-07-14 RX ADMIN — PROPAFENONE HYDROCHLORIDE 300 MG: 150 TABLET, COATED ORAL at 21:26

## 2020-07-14 NOTE — ED PROVIDER NOTES
Presents with complaint of atrial fibrillation. Patient has a history of A. fib but states has not had any episodes since pacemaker was placed 5 years ago. Patient states she woke up and felt jittery this morning and had heart palpitations but denies any nausea vomiting diarrhea chest pain or shortness of breath. She states she called [facility] Cardiology and was told to come to the emergency department because she is been in A. fib with RVR with rates 1 50-1 80s. She is not on any blood thinners and states that she could not tolerate the medication because it made her fatigued. The history is provided by the patient. Irregular Heart Beat    This is a new problem. The current episode started 3 to 5 hours ago. The problem has not changed since onset. The problem occurs constantly. Associated symptoms include irregular heartbeat and shortness of breath. Pertinent negatives include no diaphoresis, no fever, no malaise/fatigue, no chest pain, no nausea, no vomiting, no lower extremity edema, no dizziness and no cough. She has tried nothing for the symptoms. Her past medical history is significant for atrial fibrillation.         Past Medical History:   Diagnosis Date    Atrial fibrillation ([address] 12/2/2014    Back pain 12/15/2014    CAD (coronary artery disease)     a-fib    Cardiac pacemaker 12/15/2014    Hypothyroidism     was previously on medication but it has been stopped    Insomnia 12/2/2014    Malaise and fatigue 12/21/2015    Oral thrush 9/16/2015    Pericardial effusion 12/15/2014    Pleuritic chest pain 1/8/2015    SSS (sick sinus syndrome) ([address] 12/21/2015       Past Surgical History:   Procedure Laterality Date    HX APPENDECTOMY      HX COLONOSCOPY  04/23/2007    HX HYSTERECTOMY      HX PACEMAKER  12/15/2014    HX PACEMAKER PLACEMENT  12/2014         Family History:   Problem Relation Age of Onset    Cancer Mother         leukemia    Heart Disease Father     Stroke Father     Cancer Brother         prostate       Social History     Socioeconomic History    Marital status:      Spouse name: Not on file    Number of children: Not on file    Years of education: Not on file    Highest education level: Not on file   Occupational History    Occupation: runs [employer] in [place]     Comment:     Occupation: Schoolteacher    Occupation: Gift shop owner   Social Needs    Financial resource strain: Not on file    Food insecurity     Worry: Not on file     Inability: Not on file   [employer] needs     Medical: Not on file     Non-medical: Not on file   Tobacco Use    Smoking status: Never Smoker    Smokeless tobacco: Never Used   Substance and Sexual Activity    Alcohol use: No     Alcohol/week: 0.0 standard drinks    Drug use: No    Sexual activity: Not Currently   Lifestyle    Physical activity     Days per week: Not on file     Minutes per session: Not on file    Stress: Not on file   Relationships    Social connections     Talks on phone: Not on file     Gets together: Not on file     Attends Mormonism service: Not on file     Active member of club or organization: Not on file     Attends meetings of clubs or organizations: Not on file     Relationship status: Not on file    Intimate partner violence     Fear of current or ex partner: Not on file     Emotionally abused: Not on file     Physically abused: Not on file     Forced sexual activity: Not on file   Other Topics Concern    Not on file   Social History Narrative    . Lives with . 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 ALLERGIES: Patient has no known allergies. Review of Systems   Constitutional: Negative for diaphoresis, fever and malaise/fatigue. Respiratory: Positive for shortness of breath. Negative for cough. Cardiovascular: Positive for palpitations. Negative for chest pain. Gastrointestinal: Negative for nausea and vomiting. Neurological: Negative for dizziness. All other systems reviewed and are negative. Vitals:    07/14/20 1030 07/14/20 1112   BP: 108/66 105/55   Pulse: (!) 146 (!) 157   Resp: 16    Temp: 98.3 °F (36.8 °C)    SpO2: 97%    Weight: 43.1 kg (95 lb)    Height: 4' 11\" (1.499 m)             Physical Exam  Vitals signs and nursing note reviewed. Constitutional:       General: She is not in acute distress. Appearance: Normal appearance. She is well-developed and normal weight. She is not ill-appearing, toxic-appearing or diaphoretic. HENT:      Head: Normocephalic and atraumatic. Mouth/Throat:      Mouth: Mucous membranes are moist.      Pharynx: Oropharynx is clear. Eyes:      General:         Right eye: No discharge. Left eye: No discharge. Conjunctiva/sclera: Conjunctivae normal.   Neck:      Musculoskeletal: Normal range of motion and neck supple. Cardiovascular:      Rate and Rhythm: Tachycardia present. Rhythm irregular. Pulmonary:      Effort: Pulmonary effort is normal. No respiratory distress. Breath sounds: Normal breath sounds. No wheezing or rales. Abdominal:      General: There is no distension. Palpations: Abdomen is soft. Tenderness: There is no abdominal tenderness. Musculoskeletal: Normal range of motion. Right lower leg: No edema. Skin:     General: Skin is warm and dry. Capillary Refill: Capillary refill takes less than 2 seconds. Findings: No erythema or rash. Neurological:      General: No focal deficit present. Mental Status: She is alert and oriented to person, place, and time. Cranial Nerves: No cranial nerve deficit. Psychiatric:         Mood and Affect: Mood normal.         Behavior: Behavior normal.          MDM  Number of Diagnoses or Management Options  Diagnosis management comments: Patient with A. fib with RVR.   Her blood pressure is borderline but is typically around 100. She is a small lady. She states that she has had a pacemaker and not had any problems with A. fib since then. She reports having to be admitted previously. She is minimally symptomatic. After Cardizem, her heart rate was in the 80s and 90s and she was started on a drip. She was given 500 cc of fluid for her pressure. I reviewed her chart and see that her [facility] pacemaker was interrogated this morning. She was called by the office and told to come to the ER because of her heart rate up to the 180s per the notes from cardiology. She was given Eliquis in the past but refused, her dose was 5 mg twice daily. Per pharmacy she should now be on 2.5 twice daily. Will discuss with [facility] [address] Cardiology.        Amount and/or Complexity of Data Reviewed  Clinical lab tests: ordered and reviewed  Tests in the radiology section of CPT®: ordered and reviewed  Review and summarize past medical records: yes (Previous interrogator checks show paced beats)  Discuss the patient with other providers: yes  Independent visualization of images, tracings, or specimens: yes (A. fib with RVR, repeat A. fib with RVR and occasional paced beats  )    Risk of Complications, Morbidity, and/or Mortality  Presenting problems: high  Diagnostic procedures: moderate  Management options: high    Patient Progress  Patient progress: improved         Procedures

## 2020-07-14 NOTE — H&P
[facility] Cardiology History & Physical      Date of  Admission: 7/14/2020 10:27 AM     Primary Care Physician: [facility] Primary Cardiologist: Dr. Olivia Carias Physician: Dr. Warren Ramirez: A. Fib RVR    HPI:  Dorys Reilly is a [de-identified] y.o. female with prior h/o PAF (refused NOAC in past), SSS s/p SJM PM placed in 2014, and hypothyroidism. Patient presented to ED at [facility] with c/o \"feeling jittery\" with heart palpitations this am. She called [facility] cardiology this am and remote PM check showed A. Fib with RVR. She was instructed to go to ED. In Ed, EKG showed A. Fib with RVR. She was given cardizem IV and fluid bolus in ED and her rates improved.          Past Medical History:   Diagnosis Date    Atrial fibrillation ([facility] 12/2/2014    Back pain 12/15/2014    CAD (coronary artery disease)     a-fib    Cardiac pacemaker 12/15/2014    Hypothyroidism     was previously on medication but it has been stopped    Insomnia 12/2/2014    Malaise and fatigue 12/21/2015    Oral thrush 9/16/2015    Pericardial effusion 12/15/2014    Pleuritic chest pain 1/8/2015    SSS (sick sinus syndrome) ([facility] [address] [address] 12/21/2015      Past Surgical History:   Procedure Laterality Date    HX APPENDECTOMY      HX COLONOSCOPY  04/23/2007    HX HYSTERECTOMY      HX PACEMAKER  12/15/2014    HX PACEMAKER PLACEMENT  12/2014       No Known Allergies   Social History     Socioeconomic History    Marital status:      Spouse name: Not on file    Number of children: Not on file    Years of education: Not on file    Highest education level: Not on file   Occupational History    Occupation: [employer] in [address] Occupation: Schoolteacher    Occupation: Gift shop owner   Social Needs    Financial resource strain: Not on file    Food insecurity     Worry: Not on file     Inability: Not on file   [employer] needs     Medical: Not on file     Non-medical: Not on file Tobacco Use    Smoking status: Never Smoker    Smokeless tobacco: Never Used   Substance and Sexual Activity    Alcohol use: No     Alcohol/week: 0.0 standard drinks    Drug use: No    Sexual activity: Not Currently   Lifestyle    Physical activity     Days per week: Not on file     Minutes per session: Not on file    Stress: Not on file   Relationships    Social connections     Talks on phone: Not on file     Gets together: Not on file     Attends Anglican service: Not on file     Active member of club or organization: Not on file     Attends meetings of clubs or organizations: Not on file     Relationship status: Not on file    Intimate partner violence     Fear of current or ex partner: Not on file     Emotionally abused: Not on file     Physically abused: Not on file     Forced sexual activity: Not on file   Other Topics Concern    Not on file   Social History Narrative    . Lives with . 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 Family History   Problem Relation Age of Onset    Cancer Mother         leukemia    Heart Disease Father     Stroke Father     Cancer Brother         prostate        Current Facility-Administered Medications   Medication Dose Route Frequency    dilTIAZem (CARDIZEM) 100 mg in 0.9% sodium chloride (MBP/ADV) 100 mL infusion  0-15 mg/hr IntraVENous TITRATE    sodium chloride 0.9 % bolus infusion 500 mL  500 mL IntraVENous ONCE     Current Outpatient Medications   Medication Sig    albuterol (PROVENTIL VENTOLIN) 2.5 mg /3 mL (0.083 %) nebulizer solution 3 mL by Nebulization route four (4) times daily. Review of Systems    Review of Systems   Constitution: Negative for diaphoresis and malaise/fatigue. HENT: Negative for congestion. Cardiovascular: Positive for irregular heartbeat and palpitations.  Negative for chest pain, claudication, cyanosis, dyspnea on exertion, leg swelling, near-syncope, orthopnea, paroxysmal nocturnal dyspnea and syncope. Respiratory: Negative for cough, shortness of breath and wheezing. Endocrine: Negative for cold intolerance and heat intolerance. Hematologic/Lymphatic: Does not bruise/bleed easily. Skin: Negative for nail changes. Neurological: Negative for dizziness, headaches and weakness. Subjective:     Visit Vitals  /55   Pulse (!) 157   Temp 98.3 °F (36.8 °C)   Resp 16   Ht 4' 11\" (1.499 m)   Wt 43.1 kg (95 lb)   SpO2 97%   BMI 19.19 kg/m²       No intake/output data recorded. No intake/output data recorded. Physical Exam:  General: Well Developed, Well Nourished, No Acute Distress  HEENT: pupils equal and round, no abnormalities noted  Neck: supple, no JVD, no carotid bruits  Heart: S1S2 irregular irregular   Lungs: Clear throughout auscultation bilaterally without adventitious sounds  Abd: soft, nontender, nondistended, with good bowel sounds  Ext: warm, no edema, calves supple/nontender, pulses 2+ bilaterally  Skin: warm and dry  Psychiatric: Normal mood and affect  Neurologic: Alert and oriented X 3    Cardiographics  Telemetry: A. Fib rate controlled   ECG: A. Fib RVR  Echocardiogram: last echo 2014 showed preserved LV function.      Labs:   Recent Results (from the past 24 hour(s))   CBC WITH AUTOMATED DIFF    Collection Time: 07/14/20 10:36 AM   Result Value Ref Range    WBC 5.7 4.3 - 11.1 K/uL    RBC 4.66 4.05 - 5.2 M/uL    HGB 12.9 11.7 - 15.4 g/dL    HCT 41.8 35.8 - 46.3 %    MCV 89.7 79.6 - 97.8 FL    MCH 27.7 26.1 - 32.9 PG    MCHC 30.9 (L) 31.4 - 35.0 g/dL    RDW 14.3 11.9 - 14.6 %    PLATELET 550 085 - 173 K/uL    MPV 10.5 9.4 - 12.3 FL    ABSOLUTE NRBC 0.00 0.0 - 0.2 K/uL    DF AUTOMATED      NEUTROPHILS 57 43 - 78 %    LYMPHOCYTES 28 13 - 44 %    MONOCYTES 12 4.0 - 12.0 %    EOSINOPHILS 2 0.5 - 7.8 %    BASOPHILS 1 0.0 - 2.0 %    IMMATURE GRANULOCYTES 0 0.0 - 5.0 %    ABS. NEUTROPHILS 3.2 1.7 - 8.2 K/UL    ABS. LYMPHOCYTES 1.6 0.5 - 4.6 K/UL    ABS. MONOCYTES 0.7 0.1 - 1.3 K/UL    ABS. EOSINOPHILS 0.1 0.0 - 0.8 K/UL    ABS. BASOPHILS 0.0 0.0 - 0.2 K/UL    ABS. IMM. GRANS. 0.0 0.0 - 0.5 K/UL   METABOLIC PANEL, COMPREHENSIVE    Collection Time: 07/14/20 10:36 AM   Result Value Ref Range    Sodium 145 136 - 145 mmol/L    Potassium 4.2 3.5 - 5.1 mmol/L    Chloride 115 (H) 98 - 107 mmol/L    CO2 24 21 - 32 mmol/L    Anion gap 6 (L) 7 - 16 mmol/L    Glucose 115 (H) 65 - 100 mg/dL    BUN 12 8 - 23 MG/DL    Creatinine 0.66 0.6 - 1.0 MG/DL    GFR est AA >60 >60 ml/min/1.73m2    GFR est non-AA >60 >60 ml/min/1.73m2    Calcium 8.2 (L) 8.3 - 10.4 MG/DL    Bilirubin, total 0.3 0.2 - 1.1 MG/DL    ALT (SGPT) 22 12 - 65 U/L    AST (SGOT) 17 15 - 37 U/L    Alk. phosphatase 99 50 - 136 U/L    Protein, total 6.6 6.3 - 8.2 g/dL    Albumin 3.4 3.2 - 4.6 g/dL    Globulin 3.2 2.3 - 3.5 g/dL    A-G Ratio 1.1 (L) 1.2 - 3.5     TROPONIN-HIGH SENSITIVITY    Collection Time: 07/14/20 10:36 AM   Result Value Ref Range    Troponin-High Sensitivity 8.7 0 - 14 pg/mL   MAGNESIUM    Collection Time: 07/14/20 10:36 AM   Result Value Ref Range    Magnesium 2.2 1.8 - 2.4 mg/dL       Patient has been seen and examined by Dr. Roxanna Mitchell and he agrees with the following assessment and plan:     Assessment/Plan:       Principal Problem:    Atrial fibrillation with RVR ([address] (7/14/2020)- will admit to tele; continue cardizem gtt. Start eliquis. Check echo and thyroid panel. NPO after midnight for likely ROGER/DCCV in am. Labs in am.     Active Problems:    SSS (sick sinus syndrome) ([facility] [address] [facility] (12/21/2015)- has SJM dual chamber PM placed 2014. Remote device check today showed A. Fib RVR. Cardiac pacemaker (12/15/2014)- see above. Palpitations (7/14/2020)- see above.          Ken Centeno NP  7/14/2020 12:21 PM

## 2020-07-14 NOTE — ED TRIAGE NOTES
Pt to room wearing a mask. Pt states her cardiologist office stated her HR was 150-180. Hx of Afib. Pt states she feels like her heart is racing. Pt states she has a pacemaker and was prescribed a blood thinner but has not been taking it because she does not like the way it makes her feel.

## 2020-07-14 NOTE — ROUTINE PROCESS
TRANSFER - OUT REPORT:    Verbal report given to [facility], RN on John  being transferred to 3rd floor-[facility] for routine progression of care       Report consisted of patients Situation, Background, Assessment and   Recommendations(SBAR). Information from the following report(s) ED Summary was reviewed with the receiving nurse. Lines:   Peripheral IV 07/14/20 Right Antecubital (Active)        Opportunity for questions and clarification was provided.       Patient transported with:   Monitor  Patient-specific medications from Pharmacy  Registered Nurse

## 2020-07-14 NOTE — ROUTINE PROCESS
TRANSFER - IN REPORT:    [facility] of care      Report consisted of patients Situation, Background, Assessment and Recommendations(SBAR). Information from the following report(s) SBAR, Kardex, ED Summary, Intake/Output, Recent Results, Med Rec Status and Cardiac Rhythm Afib was reviewed with the receiving nurse. Opportunity for questions and clarification was provided. Assessment completed upon patients arrival to unit and care assumed.

## 2020-07-14 NOTE — ROUTINE PROCESS
Admission skin assessment completed with second RN and reveals the following: sacrum intact and heels bilaterally are dry and flaky. No breakdown in noted.

## 2020-07-15 VITALS
DIASTOLIC BLOOD PRESSURE: 71 MMHG | OXYGEN SATURATION: 96 % | TEMPERATURE: 98 F | RESPIRATION RATE: 20 BRPM | BODY MASS INDEX: 19.01 KG/M2 | HEIGHT: 59 IN | HEART RATE: 63 BPM | WEIGHT: 94.3 LBS | SYSTOLIC BLOOD PRESSURE: 106 MMHG

## 2020-07-15 LAB
ANION GAP SERPL CALC-SCNC: 8 MMOL/L (ref 7–16)
BUN SERPL-MCNC: 16 MG/DL (ref 8–23)
CALCIUM SERPL-MCNC: 8.1 MG/DL (ref 8.3–10.4)
CHLORIDE SERPL-SCNC: 110 MMOL/L (ref 98–107)
CO2 SERPL-SCNC: 23 MMOL/L (ref 21–32)
CREAT SERPL-MCNC: 0.71 MG/DL (ref 0.6–1)
ERYTHROCYTE [DISTWIDTH] IN BLOOD BY AUTOMATED COUNT: 14.6 % (ref 11.9–14.6)
GLUCOSE SERPL-MCNC: 114 MG/DL (ref 65–100)
HCT VFR BLD AUTO: 38.1 % (ref 35.8–46.3)
HGB BLD-MCNC: 11.6 G/DL (ref 11.7–15.4)
MCH RBC QN AUTO: 27.8 PG (ref 26.1–32.9)
MCHC RBC AUTO-ENTMCNC: 30.4 G/DL (ref 31.4–35)
MCV RBC AUTO: 91.1 FL (ref 79.6–97.8)
NRBC # BLD: 0 K/UL (ref 0–0.2)
PLATELET # BLD AUTO: 285 K/UL (ref 150–450)
PMV BLD AUTO: 10.4 FL (ref 9.4–12.3)
POTASSIUM SERPL-SCNC: 4.3 MMOL/L (ref 3.5–5.1)
RBC # BLD AUTO: 4.18 M/UL (ref 4.05–5.2)
SODIUM SERPL-SCNC: 141 MMOL/L (ref 136–145)
WBC # BLD AUTO: 8.4 K/UL (ref 4.3–11.1)

## 2020-07-15 PROCEDURE — 80048 BASIC METABOLIC PNL TOTAL CA: CPT

## 2020-07-15 PROCEDURE — 85027 COMPLETE CBC AUTOMATED: CPT

## 2020-07-15 PROCEDURE — 74011250637 HC RX REV CODE- 250/637: Performed by: INTERNAL MEDICINE

## 2020-07-15 PROCEDURE — 74011000258 HC RX REV CODE- 258: Performed by: NURSE PRACTITIONER

## 2020-07-15 PROCEDURE — 74011250637 HC RX REV CODE- 250/637: Performed by: NURSE PRACTITIONER

## 2020-07-15 PROCEDURE — 74011250636 HC RX REV CODE- 250/636: Performed by: NURSE PRACTITIONER

## 2020-07-15 PROCEDURE — 93306 TTE W/DOPPLER COMPLETE: CPT

## 2020-07-15 PROCEDURE — 36415 COLL VENOUS BLD VENIPUNCTURE: CPT

## 2020-07-15 RX ORDER — METOPROLOL SUCCINATE 25 MG/1
25 TABLET, EXTENDED RELEASE ORAL DAILY
Qty: 30 TAB | Refills: 3 | Status: SHIPPED | OUTPATIENT
Start: 2020-07-16 | End: 2021-08-18

## 2020-07-15 RX ORDER — METOPROLOL SUCCINATE 25 MG/1
25 TABLET, EXTENDED RELEASE ORAL DAILY
Status: DISCONTINUED | OUTPATIENT
Start: 2020-07-16 | End: 2020-07-15 | Stop reason: HOSPADM

## 2020-07-15 RX ADMIN — APIXABAN 2.5 MG: 2.5 TABLET, FILM COATED ORAL at 10:38

## 2020-07-15 RX ADMIN — SODIUM CHLORIDE 7.5 MG/HR: 900 INJECTION, SOLUTION INTRAVENOUS at 03:24

## 2020-07-15 RX ADMIN — Medication 10 ML: at 13:47

## 2020-07-15 RX ADMIN — METOPROLOL TARTRATE 50 MG: 50 TABLET, FILM COATED ORAL at 08:41

## 2020-07-15 RX ADMIN — Medication 10 ML: at 05:02

## 2020-07-15 NOTE — PROGRESS NOTES
[facility] CARDIOLOGY PROGRESS NOTE           7/15/2020 2:10 PM    Admit Date: 7/14/2020      Subjective:   No cp or sob  Will allow for Eliquis 2.5    ROS:  Cardiovascular:  As noted above    Objective:      Vitals:    07/15/20 0054 07/15/20 0445 07/15/20 0848 07/15/20 0850   BP: (!) 84/45 101/49 98/52 112/60   Pulse: 60 60 63    Resp: 18 18 20    Temp: 97.6 °F (36.4 °C) 97.8 °F (36.6 °C) 97.8 °F (36.6 °C)    SpO2: 95% 95% 96%    Weight:  42.8 kg (94 lb 4.8 oz)     Height:           Physical Exam:  General-No Acute Distress  Neck- supple, no JVD  CV- regular rate and rhythm no MRG  Lung- clear bilaterally  Abd- soft, nontender, nondistended  Ext- no edema bilaterally.   Skin- warm and dry    Data Review:   Recent Labs     07/15/20  0325 07/14/20  1036    145   K 4.3 4.2   MG  --  2.2   BUN 16 12   CREA 0.71 0.66   * 115*   WBC 8.4 5.7   HGB 11.6* 12.9   HCT 38.1 41.8    298       Assessment/Plan:     Principal Problem:    Atrial fibrillation with RVR (HCC) (7/14/2020)        Active Problems:    SSS (sick [address] syndrome) ([facility] [address] (12/21/2015)          Cardiac pacemaker (12/15/2014)          Palpitations (7/14/2020)      ////    She is NSR this AM  Will discharge home  Add Eliquis 2.5 and Toprol One Lv Collier MD  7/15/2020 2:10 PM

## 2020-07-15 NOTE — DISCHARGE INSTRUCTIONS
Patient Education        Learning About Atrial Fibrillation  What is atrial fibrillation? Atrial fibrillation (say \"AY-tree-chano zgf-jhrz-UYQ-shun\") is the most common type of irregular heartbeat (arrhythmia). Normally, the heart beats in a strong, steady rhythm. In atrial fibrillation, a problem with the heart's electrical system causes the two upper parts of the heart (the atria) to quiver, or fibrillate. Your heart rate also may be faster than normal.  Atrial fibrillation can be dangerous because if the heartbeat isn't strong and steady, blood can collect, or pool, in the atria. And pooled blood is more likely to form clots. Clots can travel to the brain, block blood flow, and cause a stroke. Atrial fibrillation can also lead to heart failure. Treatment for atrial fibrillation helps prevent stroke and heart failure. It also helps relieve symptoms. Atrial fibrillation is often caused by another heart problem. It may happen after heart surgery. It may also be caused by other problems, such as an overactive thyroid gland or lung disease. Many people with atrial fibrillation are able to live full and active lives. What are the symptoms? Some people feel symptoms when they have episodes of atrial fibrillation. But other people don't notice any symptoms. If you have symptoms, you may feel:  · A fluttering, racing, or pounding feeling in your chest called palpitations. · Weak or tired. · Dizzy or lightheaded. · Short of breath. · Chest pain. · Confused. You may notice signs of atrial fibrillation when you check your pulse. Your pulse may seem uneven or fast.  What can you expect when you have atrial fibrillation? At first, spells of atrial fibrillation may come on suddenly and last a short time. It may go away on its own or it goes away after treatment. This is called paroxysmal atrial fibrillation. Over time, the spells may last longer and occur more often. They often don't go away on their own.   How is it treated? Treatments can help you feel better and prevent future problems, especially stroke and heart failure. The main types of treatment slow the heart rate, control the heart rhythm, and help prevent stroke. Your treatment will depend on the cause of your atrial fibrillation, your symptoms, and your risk for stroke. · Heart rate treatment. Medicine may be used to slow your heart rate. Your heartbeat may still be irregular. But these medicines keep your heart from beating too fast. They may also help relieve your symptoms. · Heart rhythm treatment. Different treatments may be used to try to stop atrial fibrillation and keep it from returning. They can also relieve symptoms. These treatments include medicine, electrical cardioversion to shock the heart back to a normal rhythm, a procedure called catheter ablation, and heart surgery. · Stroke prevention. You and your doctor can decide how to lower your risk. You may decide to take a blood-thinning medicine called an anticoagulant. How can you live well with it? You can live well and help manage atrial fibrillation by having a heart-healthy lifestyle. This lifestyle may help reduce how often you have episodes of atrial fibrillation. If you are overweight, losing weight can help relieve symptoms. To have a heart-healthy lifestyle:  · Don't smoke. · Eat heart-healthy foods. · Be active. Talk to your doctor about what type and level of exercise is safe for you. · Stay at a healthy weight. Lose weight if you need to. · Avoid alcohol if it triggers symptoms. · Manage other health problems such as high blood pressure, high cholesterol, and diabetes. · Avoid getting sick from the flu. Get a flu shot every year. · Manage stress. Where can you learn more? Go to http://www.gray.com/  Enter L274 in the search box to learn more about \"Learning About Atrial Fibrillation. \"  Current as of: December 16, 2019               Content Version: 12.5  © 6458-1036 Healthwise, Incorporated. Care instructions adapted under license by [employer] (which disclaims liability or warranty for this information). If you have questions about a medical condition or this instruction, always ask your healthcare professional. [address] any warranty or liability for your use of this information. Patient Education   Apixaban (By mouth)   Apixaban (a-PIX-a-ban)  Treats and prevents blood clots. This medicine is a blood thinner. Brand Name(s): Eliquis   There may be other brand names for this medicine. When This Medicine Should Not Be Used: This medicine is not right for everyone. Do not use it if you had an allergic reaction to apixaban or you have active bleeding. How to Use This Medicine:   Tablet  · Your doctor will tell you how much medicine to use. Do not use more than directed. · If you are not able to swallow the tablets whole, they may be crushed and mixed in water, 5% dextrose in water (D5W), apple juice, or applesauce. The crushed tablets may be mixed with 60 mL of water or D5W dose and given through a nasogastric tube (NGT). · This medicine should come with a Medication Guide. Ask your pharmacist for a copy if you do not have one. · Missed dose: Take a dose as soon as you remember. If it is almost time for your next dose, wait until then and take a regular dose. Do not take extra medicine to make up for a missed dose. · Store the medicine in a closed container at room temperature, away from heat, moisture, and direct light. Drugs and Foods to Avoid:   Ask your doctor or pharmacist before using any other medicine, including over-the-counter medicines, vitamins, and herbal products. · Some medicines can affect how apixaban works.  Tell your doctor if you are using any of the following:   ¨ Carbamazepine, clarithromycin, itraconazole, ketoconazole, phenytoin, rifampin, ritonavir, Ye's wort  ¨ Blood thinner (including clopidogrel, heparin, prasugrel, warfarin)  ¨ Medicine to treat depression  ¨ NSAID pain or arthritis medicine (including aspirin, celecoxib, diclofenac, ibuprofen, naproxen)  Warnings While Using This Medicine:   · Tell your doctor if you are pregnant or breastfeeding, or if you have kidney disease, liver disease, bleeding problems, or an artificial heart valve. · Do not stop using this medicine suddenly without asking your doctor. You might have a higher risk of stroke for a short time after you stop using this medicine. · This medicine increases your risk for bleeding that can become serious if not controlled. You may also bruise easily, and it may take longer than usual for bleeding to stop. · This medicine may increase your risk for blood clots in your spine or back if you undergo an epidural or spinal puncture. This could lead to paralysis. Tell your doctor if you ever had spine problems or back surgery. · Tell any doctor or dentist who treats you that you are using this medicine. With your doctor's supervision, you may need to stop using this medicine several days before you have surgery or medical tests. · Your doctor will do lab tests at regular visits to check on the effects of this medicine. Keep all appointments. · Keep all medicine out of the reach of children. Never share your medicine with anyone.   Possible Side Effects While Using This Medicine:   Call your doctor right away if you notice any of these side effects:  · Allergic reaction: Itching or hives, swelling in your face or hands, swelling or tingling in your mouth or throat, chest tightness, trouble breathing  · Change in how much or how often you urinate, red or pink urine  · Chest pain, trouble breathing  · Coughing up blood, vomiting blood or material that looks like coffee grounds  · Numbness, tingling, or muscle weakness in your legs or feet  · Red or black, tarry stools  · Unusual bleeding, bruising, or weakness  If you notice other side effects that you think are caused by this medicine, tell your doctor. Call your doctor for medical advice about side effects. You may report side effects to FDA at 4-236-OAU-4929  © 2017 [employer] Information is for End User's use only and may not be sold, redistributed or otherwise used for commercial purposes. The above information is an  only. It is not intended as medical advice for individual conditions or treatments. Talk to your doctor, nurse or pharmacist before following any medical regimen to see if it is safe and effective for you. Patient Education   Metoprolol (By mouth)   Metoprolol (met-oh-PROE-lol)  Treats high blood pressure, angina (chest pain), and heart failure. May lower the risk of death after a heart attack. This medicine is a beta-blocker. Brand Name(s): Lopressor, Toprol XL   There may be other brand names for this medicine. When This Medicine Should Not Be Used: This medicine is not right for everyone. Do not use if you had an allergic reaction to metoprolol or similar medicines. Do not use this medicine if you have certain blood circulation or heart problems. Ask your doctor about these problems. How to Use This Medicine:   Tablet, Long Acting Tablet  · Take your medicine as directed. Your dose may need to be changed several times to find what works best for you. · Take this medicine with a meal or right after a meal. Take this medicine the same way every day, at the same time. · Swallow the tablet whole with a glass of water. You may break the extended-release tablet in half, but do not chew or crush it. · Missed dose: Take a dose as soon as you remember. If it is almost time for your next dose, wait until then and take a regular dose. Do not take extra medicine to make up for a missed dose. · Store the medicine in a closed container at room temperature, away from heat, moisture, and direct light.   Drugs and Foods to Avoid:   Ask your doctor or pharmacist before using any other medicine, including over-the-counter medicines, vitamins, and herbal products. · Some medicines can affect how metoprolol works. Tell your doctor if you are taking any of the following:   ¨ Digoxin, dipyridamole, hydralazine, hydroxychloroquine, methyldopa, quinidine  ¨ Medicine to treat depression (such as bupropion, clomipramine, desipramine, fluoxetine, fluvoxamine, paroxetine, sertraline), medicine to treat mental illness (such as chlorpromazine, fluphenazine, haloperidol, thioridazine), medicine for heart rhythm problems (such as propafenone), HIV/AIDS medicine (such as ritonavir), medicine to treat a fungus infection (such as terbinafine), a monoamine oxidase inhibitor (MAOI), an ergot medicine for headaches, a calcium channel blocker (such as amlodipine, diltiazem, verapamil), or an alpha blocker (such as clonidine, prazosin, reserpine, guanethidine)  Warnings While Using This Medicine:   · Tell your doctor if you are pregnant or breastfeeding, or if you have blood vessel, heart, or circulation problems (such as heart failure, rhythm problems, or slow heartbeat). Tell your doctor if you have kidney disease, liver disease, diabetes, lung disease (such as asthma), an overactive thyroid, or a history of allergies. · This medicine may cause worse symptoms of heart failure while the dose is being adjusted. · Do not stop using this medicine suddenly. Your doctor will need to slowly decrease your dose before you stop it completely. · Tell any doctor or dentist who treats you that you are using this medicine. You may need to stop using this medicine several days before you have surgery or medical tests. · This medicine could lower your blood pressure too much, especially when you first use it or if you are dehydrated. Stand or sit up slowly if you feel lightheaded or dizzy. · This medicine may make you dizzy or drowsy.  Do not drive or do anything else that could be dangerous until you know how this medicine affects you. · Your doctor will check your progress and the effects of this medicine at regular visits. Keep all appointments. · Keep all medicine out of the reach of children. Never share your medicine with anyone. Possible Side Effects While Using This Medicine:   Call your doctor right away if you notice any of these side effects:  · Allergic reaction: Itching or hives, swelling in your face or hands, swelling or tingling in your mouth or throat, chest tightness, trouble breathing  · Lightheadedness, dizziness, or fainting  · Slow heartbeat  · Swelling in your hands, ankles, or feet, trouble breathing, tiredness  · Worsening chest pain  If you notice these less serious side effects, talk with your doctor:   · Diarrhea  · Mild dizziness or tiredness  If you notice other side effects that you think are caused by this medicine, tell your doctor. Call your doctor for medical advice about side effects. You may report side effects to FDA at 7-871-FDA-1987  © 2017 [facility] Information is for End User's use only and may not be sold, redistributed or otherwise used for commercial purposes. The above information is an  only. It is not intended as medical advice for individual conditions or treatments. Talk to your doctor, nurse or pharmacist before following any medical regimen to see if it is safe and effective for you.

## 2020-07-15 NOTE — PROGRESS NOTES
Care Management Interventions  PCP Verified by CM: Yes  Last Visit to PCP: 04/23/20  Mode of Transport at Discharge: Other (see comment)(Roger Araiza  Spouse  [phone] )  Transition of Care Consult (CM Consult): Discharge Planning  Discharge Durable Medical Equipment: No  Physical Therapy Consult: No  Occupational Therapy Consult: No  Current Support Network: Lives with Spouse, Own Home  Confirm Follow Up Transport: Family  Discharge Location  Discharge Placement: Home    Pt admitted to 3rd floor [facility] for afib with RVR. CM met with pt to discuss CM needs & DCP. Pt is A&Ox4. Pt is indep at home with all ADLS. Pt lives with her spouse. Pt has no DME needs. ( has a cane, not in use). Pt has no difficulty with obtaining medications or transport. DCP home with spouse. No further needs noted. CM to continue to monitor.

## 2020-07-15 NOTE — DISCHARGE SUMMARY
[facility] Cardiology Discharge Summary     Patient ID:  Shae Garcia  [id number]  11 y.o.  1940    Admit date: 7/14/2020    Discharge date and time:  7-     Admitting Physician: Dnaial Chaudhari MD     Discharge Physician: Kaz James PA-C/Dr. Joyce Roe    Admission Diagnoses: Atrial fibrillation with RVR [facility]) [I48.91]    Discharge Diagnoses:   Patient Active Problem List    Diagnosis Date Noted    Atrial fibrillation with RVR ([facility] 07/14/2020    Acquired hypothyroidism 07/14/2020    Palpitations 07/14/2020    Bradycardia 01/13/2020    VTE (venous thromboembolism) 03/16/2018    Bronchiectasis ([facility] 03/16/2018    Cough 08/10/2016    Lung nodule 07/28/2016    [facility] 12/21/2015    Malaise and fatigue 12/21/2015    Sinusitis, acute maxillary 09/16/2015    Oral thrush 09/16/2015    Pleuritic chest pain 01/08/2015    Back pain 12/15/2014    Pericardial effusion 12/15/2014    Cardiac pacemaker 12/15/2014    Atrial fibrillation ([facility] 12/02/2014    Insomnia 12/02/2014       Cardiology Procedures this admission:  EchoCardiogram  Consults: None    Hospital Course: Pt is an [de-identified] y. o. WF with h/o PAF (refused NOAC in past), SSS s/p SJM PPM placed in 2014 and hypothyroidism. Patient presented to [facility] ED with c/o \"feeling jittery\" with heart palpitations. She called [facility] Cardiology and remote PM check showed A. Fib with RVR. She was instructed to go to ED. In ED, EKG showed AF with RVR. She was given cardizem IV and fluid bolus in ED and her rates improved. She was admitted with AF on IV Cardizem with plan for ROGER/eCV if AF was persistent. Echo showed EF 55-60%, no regional wall motion abnormalities, RV was mildly dilated, MARÍA, mild to moderate mitral regurgitation and moderate tricuspid regurgitation. TSH was WNL. Pt was started on Eliquis 2.5 mg every 12 hours due to age of [de-identified] yo and weight <60 kg. She was in NSR the following morning therefore didn't require ROGER/eCV. Dr. Glenn Cedeno started her on Toprol XL 25 mg daily. Pt was up feeling well without any complaints of CP, SOB or palpitations. Pt's labs were WNL. Pt was seen and examined by Dr. Glenn Cedeno and determined stable and ready for discharge. DISPOSITION: The patient is being discharged home in stable condition on a low saturated fat, low cholesterol and low salt diet. Pt is instructed to advance activities as tolerated limited to fatigue or shortness of breath. Pt is instructed to call office or return to ER for immediate evaluation of any shortness of breath or chest pain. Follow up with [facility] Dr. Glenn Cedeno on Monday 8/3 at 3:45 PM [place] office  Follow up with PCP     Discharge Exam:   Visit Vitals  /60 (BP 1 Location: Left arm, BP Patient Position: At rest)   Pulse 63   Temp 97.8 °F (36.6 °C)   Resp 20   Ht 4' 11\" (1.499 m)   Wt 42.8 kg (94 lb 4.8 oz)   SpO2 96%   BMI 19.05 kg/m²   Pt has been seen by Dr. Glenn Cedeno: see his progress note for exam details.     Recent Results (from the past 24 hour(s))   METABOLIC PANEL, BASIC    Collection Time: 07/15/20  3:25 AM   Result Value Ref Range    Sodium 141 136 - 145 mmol/L    Potassium 4.3 3.5 - 5.1 mmol/L    Chloride 110 (H) 98 - 107 mmol/L    CO2 23 21 - 32 mmol/L    Anion gap 8 7 - 16 mmol/L    Glucose 114 (H) 65 - 100 mg/dL    BUN 16 8 - 23 MG/DL    Creatinine 0.71 0.6 - 1.0 MG/DL    GFR est AA >60 >60 ml/min/1.73m2    GFR est non-AA >60 >60 ml/min/1.73m2    Calcium 8.1 (L) 8.3 - 10.4 MG/DL   CBC W/O DIFF    Collection Time: 07/15/20  3:25 AM   Result Value Ref Range    WBC 8.4 4.3 - 11.1 K/uL    RBC 4.18 4.05 - 5.2 M/uL    HGB 11.6 (L) 11.7 - 15.4 g/dL    HCT 38.1 35.8 - 46.3 %    MCV 91.1 79.6 - 97.8 FL    MCH 27.8 26.1 - 32.9 PG    MCHC 30.4 (L) 31.4 - 35.0 g/dL    RDW 14.6 11.9 - 14.6 %    PLATELET 125 895 - 486 K/uL    MPV 10.4 9.4 - 12.3 FL    ABSOLUTE NRBC 0.00 0.0 - 0.2 K/uL         Patient Instructions:   Current Discharge Medication List      START taking these medications    Details   metoprolol succinate (TOPROL-XL) 25 mg XL tablet Take 1 Tab by mouth daily. Qty: 30 Tab, Refills: 3      apixaban (ELIQUIS) 2.5 mg tablet Take 1 Tab by mouth every twelve (12) hours. Qty: 60 Tab, Refills: 6         CONTINUE these medications which have NOT CHANGED    Details   albuterol (PROVENTIL VENTOLIN) 2.5 mg /3 mL (0.083 %) nebulizer solution 3 mL by Nebulization route four (4) times daily.   Qty: 120 Each, Refills: 3               Signed:  Judy Ruby PA-C  7/15/2020  3:12 PM

## 2020-07-15 NOTE — PROGRESS NOTES
Pt. Voiced concerned about IV that had been infiltrated through the night with Cardizem drip. Offered pt. Ice packs to help reduce swelling but pt. Refused. Redness and swelling in the affected arm. The IV was removed as and a new one placed on left arm. Will continue to monitor.

## 2020-07-15 NOTE — ROUTINE PROCESS
Bedside and Verbal shift change report to be given to self (oncoming nurse) by Felipe Arias RN (offgoing nurse). Report included the following information SBAR, Kardex and MAR.

## 2020-07-16 ENCOUNTER — PATIENT OUTREACH (OUTPATIENT)
Dept: CASE MANAGEMENT | Age: 80
End: 2020-07-16

## 2020-07-16 NOTE — PROGRESS NOTES
Initial RENAY outreach attempt to patient's home/mobile number was unsuccessful. Left message to return call. Will attempt second outreach within 24 hours.

## 2020-07-17 ENCOUNTER — PATIENT OUTREACH (OUTPATIENT)
Dept: CASE MANAGEMENT | Age: 80
End: 2020-07-17

## 2020-07-17 NOTE — PROGRESS NOTES
Transition of Care Hospital Discharge Follow-Up      Date/Time:  2020 9:48 AM    Patient was admitted to [facility] on 2020 and discharged on 7/15/2020 for Atrial Fibrillation RVR. The physician discharge summary was available at the time of outreach. Patient was contacted within 7 business days of discharge. Inpatient RUR score: 3%  Was this a readmission? no   Patient stated reason for the readmission: none  Patients top risk factors for readmission: Atrial Fibrillation RVR      LPN Care Coordinator contacted the patient by telephone to perform post hospital discharge assessment. Verified name and  with patient as identifiers. Provided introduction to self, and explanation of the Care Coordinator role. Patient received hospital discharge instructions. LPN reviewed discharge instructions and red flags with patient who verbalized understanding. Patient given an opportunity to ask questions and does not have any further questions or concerns at this time. The patient agrees to contact the PCP office for questions related to their healthcare. LPN provided contact information for future reference. Home Health orders at discharge: [address]:   Date of initial or scheduled visit:   (Assist with coordination of services if necessary.)    Durable Medical Equipment ordered at discharge: none  [address]:  [address] [facility] received:   (Assist patient in obtaining DME orders &/or equipment if necessary.)    Medication(s):   Medication review was performed with patient, who verbalizes understanding of administration of home medications. There were no barriers to obtaining medications identified at this time. Current Outpatient Medications   Medication Sig    metoprolol succinate (TOPROL-XL) 25 mg XL tablet Take 1 Tab by mouth daily.  apixaban (ELIQUIS) 2.5 mg tablet Take 1 Tab by mouth every twelve (12) hours.     albuterol (PROVENTIL VENTOLIN) 2.5 mg /3 mL (0.083 %) nebulizer solution 3 mL by Nebulization route four (4) times daily. No current facility-administered medications for this visit. There are no discontinued medications. ADL assessment:   (If patient is unable to perform ADLs  what is the limiting factor(s)? Do they have a support system that can assist? If no support system is present, discuss possible assistance that they may be able to obtain. Escalate for SW if ongoing issues are verbalized by pt or anticipated)    [facility] follow up appointment(s):   Future Appointments   Date Time Provider Avtar Copeland   7/31/2020  8:45  [address] [facility]   8/3/2020  3:45 PM Elva Youngblood MD [facility]      Non-[facility] follow up appointment(s):   7 Day follow up with PCP or Specialist: Dr. Teto Sen 8/3/2020  Transportation arranged: none    Covid Risk Education    Patient has following risk factors of: Atrial Fibrillation RVR. Education provided regarding infection prevention, and signs and symptoms of COVID-19 and when to seek medical attention with patient who verbalized understanding. Discussed exposure protocols and quarantine From CDC: Are you at higher risk for severe illness?  and given an opportunity for questions and concerns. The patient agrees to contact the COVID-19 hotline [phone] or PCP office for questions related to COVID-19. For more information on steps you can take to protect yourself, see CDC's How to Protect Yourself     Patient/family/caregiver given information for Margot Blancas and agrees to enroll no  Patient's preferred e-mail: declines  Patient's preferred phone number: declines      Any other questions or concerns expressed by patient? Patient voiced no concerns.     Scheduled next follow up call or referral to CTN/ ACM:  Next follow up call:within 14 days    Goals Addressed                 This Visit's Progress     Takes Medications as prescribed   On track

## 2020-07-29 ENCOUNTER — PATIENT OUTREACH (OUTPATIENT)
Dept: CASE MANAGEMENT | Age: 80
End: 2020-07-29

## 2020-07-29 NOTE — PROGRESS NOTES
Transition of Care Hospital Discharge Follow-Up      Date/Time:  2020 9:42 AM    LPN Care Coordinator contacted the patient by telephone to perform post hospital follow up. Verified name and  with patient as identifiers. LPN reinforced discharge instructions and red flags with patient who verbalized understanding. Patient given an opportunity to ask questions and does not have any further questions or concerns at this time. The patient agrees to contact the PCP office for questions related to their healthcare    [facility] follow up appointment(s):   Future Appointments   Date Time Provider Avtar Copeland   2020  8:45  [address] [facility]   8/3/2020  3:45 PM Donn Phoenix, MD [facility]      Non-[facility] follow up appointment(s):   Patient attended follow up appointments since last contact: Dr. Sinai Easley 8/3/2020  What was the outcome of the appointment:     [address]: [address] [place] [address] Road:  Any issues/ progress:  (Assist with coordination of services if necessary.)      Medication(s):   Medication changes since discharge:     Current Outpatient Medications   Medication Sig    metoprolol succinate (TOPROL-XL) 25 mg XL tablet Take 1 Tab by mouth daily.  apixaban (ELIQUIS) 2.5 mg tablet Take 1 Tab by mouth every twelve (12) hours.  albuterol (PROVENTIL VENTOLIN) 2.5 mg /3 mL (0.083 %) nebulizer solution 3 mL by Nebulization route four (4) times daily. No current facility-administered medications for this visit. Other Issues/ Miscellaneous? (Transportation, access to meals, ability to perform ADLs, adequate caregiver support, etc.)  Referrals needed? (SW, Diabetes education, HH, etc.)    Any other questions or concerns expressed by patient? Patient voiced no concerns.     Schedule next appointment with RENAY or referral to CTN/ ACM:  Graduation:Yes  Next Outreach Scheduled:     Goals      Takes Medications as prescribed

## 2021-03-15 ENCOUNTER — TRANSCRIBE ORDER (OUTPATIENT)
Dept: SCHEDULING | Age: 81
End: 2021-03-15

## 2021-03-15 DIAGNOSIS — M81.0 AGE-RELATED OSTEOPOROSIS WITHOUT CURRENT PATHOLOGICAL FRACTURE: Primary | ICD-10-CM

## 2021-06-17 ENCOUNTER — HOSPITAL ENCOUNTER (OUTPATIENT)
Dept: CT IMAGING | Age: 81
Discharge: HOME | End: 2021-06-17
Attending: FAMILY MEDICINE
Payer: MEDICARE

## 2021-06-17 ENCOUNTER — TRANSCRIBE ORDER (OUTPATIENT)
Dept: SCHEDULING | Age: 81
End: 2021-06-17

## 2021-06-17 DIAGNOSIS — R06.02 SHORTNESS OF BREATH: ICD-10-CM

## 2021-06-17 DIAGNOSIS — R06.02 SHORTNESS OF BREATH: Primary | ICD-10-CM

## 2021-06-17 LAB — CREAT BLD-MCNC: 0.74 MG/DL (ref 0.8–1.5)

## 2021-06-17 PROCEDURE — 82565 ASSAY OF CREATININE: CPT

## 2021-06-17 PROCEDURE — 74011000258 HC RX REV CODE- 258

## 2021-06-17 PROCEDURE — 74011000636 HC RX REV CODE- 636

## 2021-06-17 PROCEDURE — 71260 CT THORAX DX C+: CPT

## 2021-06-17 RX ORDER — SODIUM CHLORIDE 0.9 % (FLUSH) 0.9 %
10 SYRINGE (ML) INJECTION
Status: COMPLETED | OUTPATIENT
Start: 2021-06-17 | End: 2021-06-17

## 2021-06-17 RX ADMIN — IOPAMIDOL 100 ML: 755 INJECTION, SOLUTION INTRAVENOUS at 17:46

## 2021-06-17 RX ADMIN — Medication 10 ML: at 17:46

## 2021-06-17 RX ADMIN — SODIUM CHLORIDE 100 ML: 900 INJECTION, SOLUTION INTRAVENOUS at 17:46

## 2021-06-24 ENCOUNTER — HOSPITAL ENCOUNTER (OUTPATIENT)
Dept: LAB | Age: 81
Discharge: HOME | End: 2021-06-24
Payer: MEDICARE

## 2021-06-24 DIAGNOSIS — R06.02 SOB (SHORTNESS OF BREATH): ICD-10-CM

## 2021-06-24 LAB — BNP SERPL-MCNC: 357 PG/ML

## 2021-06-24 PROCEDURE — 83880 ASSAY OF NATRIURETIC PEPTIDE: CPT

## 2021-06-24 PROCEDURE — 36415 COLL VENOUS BLD VENIPUNCTURE: CPT

## 2022-03-18 PROBLEM — I82.90 VTE (VENOUS THROMBOEMBOLISM): Status: ACTIVE | Noted: 2018-03-16

## 2022-03-19 PROBLEM — E03.9 ACQUIRED HYPOTHYROIDISM: Status: ACTIVE | Noted: 2020-07-14

## 2022-03-19 PROBLEM — I48.91 ATRIAL FIBRILLATION WITH RVR (HCC): Status: ACTIVE | Noted: 2020-07-14

## 2022-03-19 PROBLEM — R00.2 PALPITATIONS: Status: ACTIVE | Noted: 2020-07-14

## 2022-03-19 PROBLEM — R00.1 BRADYCARDIA: Status: ACTIVE | Noted: 2020-01-13

## 2022-03-20 PROBLEM — J47.9 BRONCHIECTASIS (HCC): Status: ACTIVE | Noted: 2018-03-16

## 2022-06-27 ENCOUNTER — TELEPHONE (OUTPATIENT)
Dept: CARDIOLOGY CLINIC | Age: 82
End: 2022-06-27

## 2022-06-27 NOTE — LETTER
[facility]  [address] [place] [address]  [phone]  _____________________________________      PRE-OP RISK ASSESSMENT    Silvana Renee  1940    Silvana Renee is scheduled for cataract surgery with [facility] on a date to be determined. Office is asking for cardiac clearance. Silvana Renee is considered clear from a cardiac standpoint for this procedure.           Thank you,                                                   6/27/2022  4:15 PM

## 2022-06-27 NOTE — TELEPHONE ENCOUNTER
Cardiac Clearance        Physician or Practice Requesting:[facility]  : Surgery Coordinator  Contact Phone Number: [phone]  Fax Number: [phone]  Date of Surgery/Procedure: ?   Type of Surgery or Procedure: Cataract Surgery  Type of Anesthesia: /  Type of Clearance Requested: Medical  Medication to Hold:?  Days to Hold: ?  This is all the info that was sent over on the clearance request

## 2022-06-28 ENCOUNTER — NURSE ONLY (OUTPATIENT)
Dept: CARDIOLOGY CLINIC | Age: 82
End: 2022-06-28
Payer: MEDICARE

## 2022-06-28 ENCOUNTER — TELEPHONE (OUTPATIENT)
Dept: CARDIOLOGY CLINIC | Age: 82
End: 2022-06-28

## 2022-06-28 DIAGNOSIS — R00.1 BRADYCARDIA: ICD-10-CM

## 2022-06-28 DIAGNOSIS — I49.5 SSS (SICK SINUS SYNDROME) (HCC): Primary | ICD-10-CM

## 2022-06-28 PROCEDURE — 93288 INTERROG EVL PM/LDLS PM IP: CPT | Performed by: INTERNAL MEDICINE

## 2022-06-28 NOTE — PROGRESS NOTES
This note will not be viewable in [address] IN OFFICE CHECK    Preliminary Impression: All normal function. No changes were made. Following MD: Dr. Miguel Angel Holder MD: Dr. Fredrick Orellana presented to the [facility] [address] today for a device check. Patient w/ only brief AMS episodes showing brief atrial tach episodes lasting seconds. Last afib episode was in 2021, patient refuse AC. RV pacing <1%. Stable pacemaker function. he device was interrogated, and the results were forwarded to the ordering provider for review.      Carlos Banks RN  6/28/2022  10:58 AM

## 2022-07-25 ENCOUNTER — OFFICE VISIT (OUTPATIENT)
Dept: CARDIOLOGY CLINIC | Age: 82
End: 2022-07-25
Payer: MEDICARE

## 2022-07-25 VITALS
WEIGHT: 94.5 LBS | HEART RATE: 72 BPM | BODY MASS INDEX: 19.05 KG/M2 | HEIGHT: 59 IN | SYSTOLIC BLOOD PRESSURE: 110 MMHG | DIASTOLIC BLOOD PRESSURE: 60 MMHG

## 2022-07-25 DIAGNOSIS — R06.02 SHORTNESS OF BREATH: Primary | ICD-10-CM

## 2022-07-25 DIAGNOSIS — I48.0 PAF (PAROXYSMAL ATRIAL FIBRILLATION) (HCC): ICD-10-CM

## 2022-07-25 PROCEDURE — 1090F PRES/ABSN URINE INCON ASSESS: CPT | Performed by: INTERNAL MEDICINE

## 2022-07-25 PROCEDURE — 1123F ACP DISCUSS/DSCN MKR DOCD: CPT | Performed by: INTERNAL MEDICINE

## 2022-07-25 PROCEDURE — G8428 CUR MEDS NOT DOCUMENT: HCPCS | Performed by: INTERNAL MEDICINE

## 2022-07-25 PROCEDURE — G8399 PT W/DXA RESULTS DOCUMENT: HCPCS | Performed by: INTERNAL MEDICINE

## 2022-07-25 PROCEDURE — 93000 ELECTROCARDIOGRAM COMPLETE: CPT | Performed by: INTERNAL MEDICINE

## 2022-07-25 PROCEDURE — G8420 CALC BMI NORM PARAMETERS: HCPCS | Performed by: INTERNAL MEDICINE

## 2022-07-25 PROCEDURE — 99214 OFFICE O/P EST MOD 30 MIN: CPT | Performed by: INTERNAL MEDICINE

## 2022-07-25 PROCEDURE — 1036F TOBACCO NON-USER: CPT | Performed by: INTERNAL MEDICINE

## 2022-07-25 NOTE — PROGRESS NOTES
[facility]  [address], [address]  PHONE: [phone]        22        NAME:  Tyson Renee  : 1940  MRN: [id number]     CHIEF COMPLAINT:    Palpitations         SUBJECTIVE:       Over the last year, no hospitalization, new medication or physician assignment. Had Covid 2022. Medications were all reviewed with the patient today and updated as necessary. Current Outpatient Medications   Medication Sig    albuterol (PROVENTIL) (2.5 MG/3ML) 0.083% nebulizer solution Inhale 2.5 mg into the lungs 4 times daily     No current facility-administered medications for this visit. No Known Allergies        PHYSICAL EXAM:     Wt Readings from Last 3 Encounters:   22 94 lb 8 oz (42.9 kg)   21 96 lb (43.5 kg)   21 95 lb (43.1 kg)     BP Readings from Last 3 Encounters:   22 110/60   21 (!) 120/50   21 110/70       /60   Pulse 72   Ht 4' 11\" (1.499 m)   Wt 94 lb 8 oz (42.9 kg)   BMI 19.09 kg/m²     Physical Exam  Vitals reviewed. HENT:      Head: Normocephalic and atraumatic. Eyes:      Extraocular Movements: Extraocular movements intact. Pupils: Pupils are equal, round, and reactive to light. Cardiovascular:      Rate and Rhythm: Normal rate. Heart sounds: Normal heart sounds. Pulmonary:      Effort: Pulmonary effort is normal.      Breath sounds: Normal breath sounds. Abdominal:      General: Abdomen is flat. Palpations: Abdomen is soft. There is no mass. Musculoskeletal:         General: Normal range of motion. Cervical back: Normal range of motion. Skin:     General: Skin is warm and dry. Neurological:      General: No focal deficit present. Mental Status: She is alert and oriented to person, place, and time. Psychiatric:         Mood and Affect: Mood normal.         RECENT LABS AND RECORDS REVIEW          ASSESSMENT and PLAN           1.  PAF ([facility]oxysmal [facility] fibrillation) ([facility]         2. Refusal [address]    ////    CV status is stable. Medications and most recent labs reviewed. Diet and exercise are encouraged. Greater  than 50% of today's visit was devoted to counseling the patient, explaining disease concepts and offering advice and suggestions for optimal care. Return in about 1 year (around 7/25/2023).        Devin Dickinson MD  7/25/2022  9:38 AM

## 2022-08-03 PROCEDURE — 93296 REM INTERROG EVL PM/IDS: CPT | Performed by: INTERNAL MEDICINE

## 2022-08-03 PROCEDURE — 93294 REM INTERROG EVL PM/LDLS PM: CPT | Performed by: INTERNAL MEDICINE

## 2022-08-29 DIAGNOSIS — Z95.0 CARDIAC PACEMAKER: ICD-10-CM

## 2022-08-29 DIAGNOSIS — Z95.0 CARDIAC PACEMAKER: Primary | ICD-10-CM

## 2022-10-30 PROCEDURE — 93296 REM INTERROG EVL PM/IDS: CPT | Performed by: INTERNAL MEDICINE

## 2022-10-30 PROCEDURE — 93294 REM INTERROG EVL PM/LDLS PM: CPT | Performed by: INTERNAL MEDICINE

## 2022-11-22 DIAGNOSIS — Z95.0 CARDIAC PACEMAKER: ICD-10-CM

## 2022-12-07 ENCOUNTER — APPOINTMENT (OUTPATIENT)
Dept: CT IMAGING | Age: 82
DRG: 871 | End: 2022-12-07
Payer: MEDICARE

## 2022-12-07 ENCOUNTER — HOSPITAL ENCOUNTER (INPATIENT)
Age: 82
LOS: 7 days | Discharge: HOME HEALTH | DRG: 871 | End: 2022-12-14
Attending: STUDENT IN AN ORGANIZED HEALTH CARE EDUCATION/TRAINING PROGRAM | Admitting: INTERNAL MEDICINE
Payer: MEDICARE

## 2022-12-07 DIAGNOSIS — K66.8 PERITONEAL FREE AIR: ICD-10-CM

## 2022-12-07 DIAGNOSIS — J18.9 PNEUMONIA OF RIGHT UPPER LOBE DUE TO INFECTIOUS ORGANISM: ICD-10-CM

## 2022-12-07 DIAGNOSIS — A41.9 SEPSIS, DUE TO UNSPECIFIED ORGANISM, UNSPECIFIED WHETHER ACUTE ORGAN DYSFUNCTION PRESENT (HCC): ICD-10-CM

## 2022-12-07 DIAGNOSIS — R07.9 CHEST PAIN, UNSPECIFIED TYPE: Primary | ICD-10-CM

## 2022-12-07 DIAGNOSIS — J81.0 ACUTE PULMONARY EDEMA (HCC): ICD-10-CM

## 2022-12-07 PROBLEM — R65.20 SEVERE SEPSIS (HCC): Status: ACTIVE | Noted: 2022-12-07

## 2022-12-07 PROBLEM — J96.01 ACUTE RESPIRATORY FAILURE WITH HYPOXEMIA (HCC): Status: ACTIVE | Noted: 2022-12-07

## 2022-12-07 PROBLEM — E87.1 HYPONATREMIA: Status: ACTIVE | Noted: 2022-12-07

## 2022-12-07 LAB
ALBUMIN SERPL-MCNC: 3.2 G/DL (ref 3.2–4.6)
ALBUMIN/GLOB SERPL: 0.9 {RATIO} (ref 0.4–1.6)
ALP SERPL-CCNC: 75 U/L (ref 50–136)
ALT SERPL-CCNC: 20 U/L (ref 12–65)
ANION GAP SERPL CALC-SCNC: 7 MMOL/L (ref 2–11)
AST SERPL-CCNC: 34 U/L (ref 15–37)
BASOPHILS # BLD: 0 K/UL (ref 0–0.2)
BASOPHILS NFR BLD: 0 % (ref 0–2)
BILIRUB SERPL-MCNC: 0.6 MG/DL (ref 0.2–1.1)
BUN SERPL-MCNC: 16 MG/DL (ref 8–23)
CALCIUM SERPL-MCNC: 8.5 MG/DL (ref 8.3–10.4)
CHLORIDE SERPL-SCNC: 101 MMOL/L (ref 101–110)
CO2 SERPL-SCNC: 23 MMOL/L (ref 21–32)
CREAT SERPL-MCNC: 1 MG/DL (ref 0.6–1)
DIFFERENTIAL METHOD BLD: ABNORMAL
EKG ATRIAL RATE: 104 BPM
EKG DIAGNOSIS: NORMAL
EKG P AXIS: 80 DEGREES
EKG P-R INTERVAL: 138 MS
EKG Q-T INTERVAL: 324 MS
EKG QRS DURATION: 78 MS
EKG QTC CALCULATION (BAZETT): 426 MS
EKG R AXIS: 78 DEGREES
EKG T AXIS: 52 DEGREES
EKG VENTRICULAR RATE: 104 BPM
EOSINOPHIL # BLD: 0 K/UL (ref 0–0.8)
EOSINOPHIL NFR BLD: 0 % (ref 0.5–7.8)
ERYTHROCYTE [DISTWIDTH] IN BLOOD BY AUTOMATED COUNT: 15.2 % (ref 11.9–14.6)
GLOBULIN SER CALC-MCNC: 3.7 G/DL (ref 2.8–4.5)
GLUCOSE SERPL-MCNC: 114 MG/DL (ref 65–100)
HCT VFR BLD AUTO: 41.3 % (ref 35.8–46.3)
HGB BLD-MCNC: 12.8 G/DL (ref 11.7–15.4)
IMM GRANULOCYTES # BLD AUTO: 0.7 K/UL (ref 0–0.5)
IMM GRANULOCYTES NFR BLD AUTO: 3 % (ref 0–5)
LACTATE SERPL-SCNC: 2.1 MMOL/L (ref 0.4–2)
LACTATE SERPL-SCNC: 2.6 MMOL/L (ref 0.4–2)
LACTATE SERPL-SCNC: 2.9 MMOL/L (ref 0.4–2)
LYMPHOCYTES # BLD: 1 K/UL (ref 0.5–4.6)
LYMPHOCYTES NFR BLD: 4 % (ref 13–44)
MCH RBC QN AUTO: 28.2 PG (ref 26.1–32.9)
MCHC RBC AUTO-ENTMCNC: 31 G/DL (ref 31.4–35)
MCV RBC AUTO: 91 FL (ref 82–102)
MONOCYTES # BLD: 1.5 K/UL (ref 0.1–1.3)
MONOCYTES NFR BLD: 6 % (ref 4–12)
NEUTS SEG # BLD: 21 K/UL (ref 1.7–8.2)
NEUTS SEG NFR BLD: 87 % (ref 43–78)
NRBC # BLD: 0 K/UL (ref 0–0.2)
PLATELET # BLD AUTO: 253 K/UL (ref 150–450)
PLATELET COMMENT: ADEQUATE
PMV BLD AUTO: 10.3 FL (ref 9.4–12.3)
POTASSIUM SERPL-SCNC: 4.3 MMOL/L (ref 3.5–5.1)
PROCALCITONIN SERPL-MCNC: 3.58 NG/ML (ref 0–0.49)
PROT SERPL-MCNC: 6.9 G/DL (ref 6.3–8.2)
RBC # BLD AUTO: 4.54 M/UL (ref 4.05–5.2)
SODIUM SERPL-SCNC: 131 MMOL/L (ref 133–143)
TROPONIN I SERPL HS-MCNC: 12.1 PG/ML (ref 0–14)
WBC # BLD AUTO: 24.2 K/UL (ref 4.3–11.1)
WBC MORPH BLD: ABNORMAL

## 2022-12-07 PROCEDURE — 93005 ELECTROCARDIOGRAM TRACING: CPT | Performed by: STUDENT IN AN ORGANIZED HEALTH CARE EDUCATION/TRAINING PROGRAM

## 2022-12-07 PROCEDURE — 85025 COMPLETE CBC W/AUTO DIFF WBC: CPT

## 2022-12-07 PROCEDURE — 2580000003 HC RX 258: Performed by: STUDENT IN AN ORGANIZED HEALTH CARE EDUCATION/TRAINING PROGRAM

## 2022-12-07 PROCEDURE — 2580000003 HC RX 258: Performed by: INTERNAL MEDICINE

## 2022-12-07 PROCEDURE — 2500000003 HC RX 250 WO HCPCS: Performed by: INTERNAL MEDICINE

## 2022-12-07 PROCEDURE — 71260 CT THORAX DX C+: CPT | Performed by: STUDENT IN AN ORGANIZED HEALTH CARE EDUCATION/TRAINING PROGRAM

## 2022-12-07 PROCEDURE — 74177 CT ABD & PELVIS W/CONTRAST: CPT

## 2022-12-07 PROCEDURE — 99285 EMERGENCY DEPT VISIT HI MDM: CPT | Performed by: STUDENT IN AN ORGANIZED HEALTH CARE EDUCATION/TRAINING PROGRAM

## 2022-12-07 PROCEDURE — 87449 NOS EACH ORGANISM AG IA: CPT

## 2022-12-07 PROCEDURE — 87040 BLOOD CULTURE FOR BACTERIA: CPT

## 2022-12-07 PROCEDURE — 96365 THER/PROPH/DIAG IV INF INIT: CPT | Performed by: STUDENT IN AN ORGANIZED HEALTH CARE EDUCATION/TRAINING PROGRAM

## 2022-12-07 PROCEDURE — 87899 AGENT NOS ASSAY W/OPTIC: CPT

## 2022-12-07 PROCEDURE — 96367 TX/PROPH/DG ADDL SEQ IV INF: CPT | Performed by: STUDENT IN AN ORGANIZED HEALTH CARE EDUCATION/TRAINING PROGRAM

## 2022-12-07 PROCEDURE — 83605 ASSAY OF LACTIC ACID: CPT

## 2022-12-07 PROCEDURE — 6360000002 HC RX W HCPCS: Performed by: STUDENT IN AN ORGANIZED HEALTH CARE EDUCATION/TRAINING PROGRAM

## 2022-12-07 PROCEDURE — 84484 ASSAY OF TROPONIN QUANT: CPT

## 2022-12-07 PROCEDURE — 84145 PROCALCITONIN (PCT): CPT

## 2022-12-07 PROCEDURE — 6360000004 HC RX CONTRAST MEDICATION: Performed by: STUDENT IN AN ORGANIZED HEALTH CARE EDUCATION/TRAINING PROGRAM

## 2022-12-07 PROCEDURE — 6370000000 HC RX 637 (ALT 250 FOR IP): Performed by: STUDENT IN AN ORGANIZED HEALTH CARE EDUCATION/TRAINING PROGRAM

## 2022-12-07 PROCEDURE — 2500000003 HC RX 250 WO HCPCS: Performed by: STUDENT IN AN ORGANIZED HEALTH CARE EDUCATION/TRAINING PROGRAM

## 2022-12-07 PROCEDURE — 1100000000 HC RM PRIVATE

## 2022-12-07 PROCEDURE — 80053 COMPREHEN METABOLIC PANEL: CPT

## 2022-12-07 RX ORDER — SODIUM CHLORIDE 0.9 % (FLUSH) 0.9 %
10 SYRINGE (ML) INJECTION
Status: COMPLETED | OUTPATIENT
Start: 2022-12-07 | End: 2022-12-07

## 2022-12-07 RX ORDER — 0.9 % SODIUM CHLORIDE 0.9 %
500 INTRAVENOUS SOLUTION INTRAVENOUS
Status: COMPLETED | OUTPATIENT
Start: 2022-12-07 | End: 2022-12-07

## 2022-12-07 RX ORDER — SODIUM CHLORIDE 0.9 % (FLUSH) 0.9 %
5-40 SYRINGE (ML) INJECTION EVERY 12 HOURS SCHEDULED
Status: DISCONTINUED | OUTPATIENT
Start: 2022-12-07 | End: 2022-12-14 | Stop reason: HOSPADM

## 2022-12-07 RX ORDER — ALBUTEROL SULFATE 2.5 MG/3ML
2.5 SOLUTION RESPIRATORY (INHALATION) EVERY 6 HOURS PRN
Status: DISCONTINUED | OUTPATIENT
Start: 2022-12-07 | End: 2022-12-14 | Stop reason: HOSPADM

## 2022-12-07 RX ORDER — SODIUM CHLORIDE 0.9 % (FLUSH) 0.9 %
5-40 SYRINGE (ML) INJECTION PRN
Status: DISCONTINUED | OUTPATIENT
Start: 2022-12-07 | End: 2022-12-14 | Stop reason: HOSPADM

## 2022-12-07 RX ORDER — 0.9 % SODIUM CHLORIDE 0.9 %
100 INTRAVENOUS SOLUTION INTRAVENOUS
Status: COMPLETED | OUTPATIENT
Start: 2022-12-07 | End: 2022-12-07

## 2022-12-07 RX ORDER — ONDANSETRON 2 MG/ML
4 INJECTION INTRAMUSCULAR; INTRAVENOUS EVERY 6 HOURS PRN
Status: DISCONTINUED | OUTPATIENT
Start: 2022-12-07 | End: 2022-12-14 | Stop reason: HOSPADM

## 2022-12-07 RX ORDER — ACETAMINOPHEN 325 MG/1
650 TABLET ORAL EVERY 6 HOURS PRN
Status: DISCONTINUED | OUTPATIENT
Start: 2022-12-07 | End: 2022-12-14 | Stop reason: HOSPADM

## 2022-12-07 RX ORDER — 0.9 % SODIUM CHLORIDE 0.9 %
1000 INTRAVENOUS SOLUTION INTRAVENOUS
Status: COMPLETED | OUTPATIENT
Start: 2022-12-07 | End: 2022-12-07

## 2022-12-07 RX ORDER — POLYETHYLENE GLYCOL 3350 17 G/17G
17 POWDER, FOR SOLUTION ORAL DAILY PRN
Status: DISCONTINUED | OUTPATIENT
Start: 2022-12-07 | End: 2022-12-14 | Stop reason: HOSPADM

## 2022-12-07 RX ORDER — ACETAMINOPHEN 650 MG/1
650 SUPPOSITORY RECTAL EVERY 6 HOURS PRN
Status: DISCONTINUED | OUTPATIENT
Start: 2022-12-07 | End: 2022-12-14 | Stop reason: HOSPADM

## 2022-12-07 RX ORDER — CHOLECALCIFEROL (VITAMIN D3) 125 MCG
5 CAPSULE ORAL NIGHTLY
COMMUNITY

## 2022-12-07 RX ORDER — SODIUM CHLORIDE, SODIUM LACTATE, POTASSIUM CHLORIDE, CALCIUM CHLORIDE 600; 310; 30; 20 MG/100ML; MG/100ML; MG/100ML; MG/100ML
INJECTION, SOLUTION INTRAVENOUS CONTINUOUS
Status: DISCONTINUED | OUTPATIENT
Start: 2022-12-07 | End: 2022-12-11

## 2022-12-07 RX ORDER — SODIUM CHLORIDE 9 MG/ML
INJECTION, SOLUTION INTRAVENOUS PRN
Status: DISCONTINUED | OUTPATIENT
Start: 2022-12-07 | End: 2022-12-14 | Stop reason: HOSPADM

## 2022-12-07 RX ORDER — ACETAMINOPHEN 325 MG/1
650 TABLET ORAL
Status: COMPLETED | OUTPATIENT
Start: 2022-12-07 | End: 2022-12-07

## 2022-12-07 RX ORDER — HEPARIN SODIUM 5000 [USP'U]/ML
5000 INJECTION, SOLUTION INTRAVENOUS; SUBCUTANEOUS 2 TIMES DAILY
Status: DISCONTINUED | OUTPATIENT
Start: 2022-12-08 | End: 2022-12-14 | Stop reason: HOSPADM

## 2022-12-07 RX ORDER — METRONIDAZOLE 500 MG/100ML
500 INJECTION, SOLUTION INTRAVENOUS
Status: COMPLETED | OUTPATIENT
Start: 2022-12-07 | End: 2022-12-07

## 2022-12-07 RX ORDER — BIOTIN 10000 MCG
CAPSULE ORAL DAILY
COMMUNITY

## 2022-12-07 RX ORDER — ONDANSETRON 4 MG/1
4 TABLET, ORALLY DISINTEGRATING ORAL EVERY 8 HOURS PRN
Status: DISCONTINUED | OUTPATIENT
Start: 2022-12-07 | End: 2022-12-14 | Stop reason: HOSPADM

## 2022-12-07 RX ORDER — AZITHROMYCIN 250 MG/1
500 TABLET, FILM COATED ORAL DAILY
Status: COMPLETED | OUTPATIENT
Start: 2022-12-08 | End: 2022-12-11

## 2022-12-07 RX ADMIN — SODIUM CHLORIDE, PRESERVATIVE FREE 10 ML: 5 INJECTION INTRAVENOUS at 17:15

## 2022-12-07 RX ADMIN — DIATRIZOATE MEGLUMINE AND DIATRIZOATE SODIUM 15 ML: 660; 100 LIQUID ORAL; RECTAL at 18:15

## 2022-12-07 RX ADMIN — TUBERCULIN PURIFIED PROTEIN DERIVATIVE 5 UNITS: 5 INJECTION, SOLUTION INTRADERMAL at 23:04

## 2022-12-07 RX ADMIN — SODIUM CHLORIDE 1000 ML: 9 INJECTION, SOLUTION INTRAVENOUS at 18:11

## 2022-12-07 RX ADMIN — IOPAMIDOL 100 ML: 755 INJECTION, SOLUTION INTRAVENOUS at 17:15

## 2022-12-07 RX ADMIN — CEFTRIAXONE 1000 MG: 1 INJECTION, POWDER, FOR SOLUTION INTRAMUSCULAR; INTRAVENOUS at 16:37

## 2022-12-07 RX ADMIN — METRONIDAZOLE 500 MG: 500 INJECTION, SOLUTION INTRAVENOUS at 18:19

## 2022-12-07 RX ADMIN — AZITHROMYCIN MONOHYDRATE 500 MG: 500 INJECTION, POWDER, LYOPHILIZED, FOR SOLUTION INTRAVENOUS at 17:01

## 2022-12-07 RX ADMIN — SODIUM CHLORIDE, PRESERVATIVE FREE 10 ML: 5 INJECTION INTRAVENOUS at 22:59

## 2022-12-07 RX ADMIN — SODIUM CHLORIDE, POTASSIUM CHLORIDE, SODIUM LACTATE AND CALCIUM CHLORIDE: 600; 310; 30; 20 INJECTION, SOLUTION INTRAVENOUS at 19:14

## 2022-12-07 RX ADMIN — SODIUM CHLORIDE, PRESERVATIVE FREE 10 ML: 5 INJECTION INTRAVENOUS at 18:46

## 2022-12-07 RX ADMIN — SODIUM CHLORIDE 100 ML: 9 INJECTION, SOLUTION INTRAVENOUS at 18:46

## 2022-12-07 RX ADMIN — IOPAMIDOL 80 ML: 755 INJECTION, SOLUTION INTRAVENOUS at 18:43

## 2022-12-07 RX ADMIN — SODIUM CHLORIDE 500 ML: 9 INJECTION, SOLUTION INTRAVENOUS at 18:18

## 2022-12-07 RX ADMIN — SODIUM CHLORIDE 100 ML: 9 INJECTION, SOLUTION INTRAVENOUS at 17:15

## 2022-12-07 RX ADMIN — ACETAMINOPHEN 650 MG: 325 TABLET ORAL at 17:47

## 2022-12-07 ASSESSMENT — ENCOUNTER SYMPTOMS
NAUSEA: 0
COUGH: 0
DIARRHEA: 0
VOMITING: 0
SHORTNESS OF BREATH: 1
SORE THROAT: 0
PHOTOPHOBIA: 0

## 2022-12-07 ASSESSMENT — PAIN SCALES - GENERAL
PAINLEVEL_OUTOF10: 0
PAINLEVEL_OUTOF10: 0

## 2022-12-07 NOTE — ED TRIAGE NOTES
Pt presents to triage in wheelchair, A&OX4 cc chest pain onset last night. Pt was seen at [place] urgent care earlier today, xray completed and diagnosed with pneumonia and was referred to come to ER for CT. Pt denies chest pain at this pain, reports right rib pain with deep breathing.

## 2022-12-07 NOTE — ED PROVIDER NOTES
Emergency Department Provider Note                   PCP:                Jose Bashir MD               Age: 80 y.o. Sex: female       ICD-10-CM    1. Chest pain, unspecified type  R07.9       2. Sepsis, due to unspecified organism, unspecified whether acute organ dysfunction present ([facility]  A41.9       3. Pneumonia of right upper lobe due to infectious organism  J18.9       4. Peritoneal free air  K66.8           DISPOSITION Decision To Admit 12/07/2022 05:54:35 PM        MDM  Number of Diagnoses or Management Options  Chest pain, unspecified type  Peritoneal free air  Pneumonia of right upper lobe due to infectious organism  Sepsis, due to unspecified organism, unspecified whether acute organ dysfunction present [facility])  Diagnosis management comments: 51-year-old female presents to the emergency department complaint of right-sided chest pain, she arrives tachycardic, O2 saturation in the mid 90s. Pleuritic chest pain. Will obtain broad-based work-up including CT scan to rule out PE. Lab work resulted showing white count 24,000, blood cultures and lactic acid were added on that time, patient with normal electrolytes normal kidney function, normal LFTs. Patient given broad-spectrum antibiotics to cover community-acquired pneumonia which include Rocephin and azithromycin. CT scan patient's chest shows large pneumonia also small area of free air concerning for small duodenal ulcer perforation noted by radiologist.  Flagyl was added on at that time. After consultation with radiology, additional CT scan patient's abdomen pelvis was ordered. General surgery was made aware of these findings and were informed of pending CT scan. Hospitalist consulted for admission. To note patient has had borderline hypertensive vital signs, patient will be given 30 cc/kg bolus IV fluid which is a total of 1.5 L. patient and family voiced understanding and agreement.       EKG interpretation shows sinus tachycardia, rate 104, , QRS 78, QTc 426, normal axis, no significant ST elevation or depression. Sepsis Reassessment note:   Katy Renee meets criteria for Sepsis -  Patient is receiving IV Fluids and Antibiotics per sepsis protocol. I did the Sepsis Reassessment at 6:09 PM 12/07/22.   Ryan Sagastume,               Amount and/or Complexity of Data Reviewed  Clinical lab tests: ordered and reviewed  Tests in the radiology section of CPT®: ordered and reviewed  Discussion of test results with the performing providers: yes  Discuss the patient with other providers: yes  Independent visualization of images, tracings, or specimens: yes    Risk of Complications, Morbidity, and/or Mortality  Presenting problems: high  Diagnostic procedures: high  Management options: high    Critical Care  Total time providing critical care: 30-74 minutes               Orders Placed This Encounter   Procedures    Blood Culture 1    Blood Culture 2    CT CHEST PULMONARY EMBOLISM W CONTRAST    CT ABDOMEN PELVIS W IV CONTRAST Additional Contrast? Oral    CMP    CBC with Auto Differential    Troponin    Lactate, Sepsis    Procalcitonin    Lactate, Sepsis    EKG 12 Lead        Medications   azithromycin (ZITHROMAX) 500 mg in sodium chloride 0.9 % 250 mL IVPB ([id number]) (500 mg IntraVENous New Bag 12/7/22 1701)   diatrizoate meglumine-sodium (GASTROGRAFIN) 66-10 % solution 15 mL (has no administration in time range)   metronidazole (FLAGYL) 500 mg in 0.9% NaCl 100 mL IVPB premix (has no administration in time range)   sodium chloride flush 0.9 % injection 10 mL (has no administration in time range)   0.9 % sodium chloride bolus (has no administration in time range)   iopamidol (ISOVUE-370) 76 % injection 80 mL (has no administration in time range)   0.9 % sodium chloride bolus (100 mLs IntraVENous New Bag 12/7/22 1715)   sodium chloride flush 0.9 % injection 10 mL (10 mLs IntraVENous Given 12/7/22 1715)   iopamidol (ISOVUE-370) 76 % injection 100 mL (100 mLs IntraVENous Given 12/7/22 1715)   cefTRIAXone (ROCEPHIN) 1,000 mg in sodium chloride 0.9 % 50 mL IVPB mini-bag (0 mg IntraVENous Stopped 12/7/22 1707)   acetaminophen (TYLENOL) tablet 650 mg (650 mg Oral Given 12/7/22 1747)       New Prescriptions    No medications on file        Katy Renee is a 80 y.o. female who presents to the Emergency Department with chief complaint of    Chief Complaint   Patient presents with    Chest Pain      59-year-old female presents emerged part complaint of right-sided chest pain. Began last night. States initially was sharp and severe on the right side of her chest.  States she was able to sleep, this morning had improved pain but still present. Worse with taking a deep breath. Has not taken any medication for the pain. Denies nausea, vomiting, cough or congestion. No swelling in lower extremities. No history of DVT, PE or CAD. Was seen at local urgent care this morning, sent here for CT scan given the concern for possible pulmonary nodules. Review of Systems   Constitutional:  Negative for chills and fever. HENT:  Negative for sore throat. Eyes:  Negative for photophobia. Respiratory:  Positive for shortness of breath. Negative for cough. Cardiovascular:  Positive for chest pain. Gastrointestinal:  Negative for diarrhea, nausea and vomiting. Genitourinary:  Negative for dysuria. Musculoskeletal:  Negative for neck pain and neck stiffness. Skin:  Negative for rash. Neurological:  Negative for syncope and headaches. Psychiatric/Behavioral:  Negative for confusion. All other systems reviewed and are negative. No past medical history on file. No past surgical history on file. No family history on file. Social History     Socioeconomic History    Marital status:          Patient has no known allergies. Previous Medications    No medications on file        Vitals signs and nursing note reviewed.    Patient Vitals for the past 4 hrs:   Temp Pulse Resp BP SpO2   12/07/22 1735 -- (!) 103 27 -- 91 %   12/07/22 1640 (!) 102.4 °F (39.1 °C) -- -- -- --   12/07/22 1635 -- 97 23 (!) 105/48 92 %   12/07/22 1512 97.7 °F (36.5 °C) (!) 103 17 102/61 95 %          Physical Exam  Vitals and nursing note reviewed. Constitutional:       General: She is not in acute distress. Appearance: Normal appearance. HENT:      Head: Normocephalic. Nose: Nose normal.      Mouth/Throat:      Mouth: Mucous membranes are moist.   Eyes:      Extraocular Movements: Extraocular movements intact. Cardiovascular:      Rate and Rhythm: Regular rhythm. Tachycardia present. Pulses: Normal pulses. Heart sounds: Normal heart sounds. Pulmonary:      Effort: Pulmonary effort is normal. No respiratory distress. Breath sounds: Normal breath sounds. Abdominal:      General: Abdomen is flat. Palpations: Abdomen is soft. Tenderness: There is no abdominal tenderness. Musculoskeletal:         General: No swelling or tenderness. Normal range of motion. Cervical back: Normal range of motion. No rigidity. Skin:     General: Skin is warm. Findings: No rash. Neurological:      General: No focal deficit present. Mental Status: She is alert and oriented to person, place, and time.    Psychiatric:         Mood and Affect: Mood normal.        Critical Care  Performed by: Byron Gale DO  Authorized by: Byron Gale DO     Critical care provider statement:     Critical care time (minutes):  36    Critical care time was exclusive of:  Separately billable procedures and treating other patients    Critical care was necessary to treat or prevent imminent or life-threatening deterioration of the following conditions:  Sepsis    Critical care was time spent personally by me on the following activities:  Ordering and performing treatments and interventions, ordering and review of laboratory studies, ordering and review of radiographic studies, pulse oximetry, re-evaluation of patient's condition, review of old charts, development of treatment plan with patient or surrogate, discussions with consultants, evaluation of patient's response to treatment, examination of patient and obtaining history from patient or surrogate    Care discussed with: admitting provider      Results for orders placed or performed during the hospital encounter of 12/07/22   CT CHEST PULMONARY EMBOLISM W CONTRAST    Narrative    Exam: CT CHEST PULMONARY EMBOLISM W CONTRAST on 12/7/2022 5:28 PM    Clinical History: The Female patient is 80years old  presenting for right sided  chest pain, tachycardia. Technique: Thin section axial images were obtained through the chest with intravenous  contrast.  Coronal reformatted images were also provided for review. A total of 100 ml of Iopamidol (ISOVUE-370) 76 % contrast was administered  intravenously. All CT scans at this facility are performed using dose reduction/dose modulation  techniques, as appropriate the performed exam, including the following:   Automated Exposure Control; Adjustment of the mA and/or kV according to patient  size (this includes techniques or standardized protocols for targeted exams  where dose is matched to indication/reason for exam); and Use of Iterative  Reconstruction Technique. Radiation Exposure Indices:  Reference Air Kerma (Ka,r) = 176 mGy-cm    Comparison:  None. FINDINGS:   Images through the lungs demonstrate consolidative changes in the posterior  right upper lobe. There are bronchiectatic changes at the lung bases. Normal vascular enhancement is demonstrated. No evidence of aortic dissection or  aneurysmal dilatation is seen. There is no evidence of pulmonary embolic  disease. There is no significant hilar or mediastinal lymphadenopathy. Images chest wall structures are unremarkable in appearance.      Images through the upper abdomen demonstrate a small focus of air in the berenice  hepatis concerning for possible duodenal ulcer perforation. There is a large  partially imaged cyst in the left mid kidney measuring at least 6 cm. There are no acute osseous abnormalities. Impression    1. No evidence of pulmonary embolic disease. 2. Posterior right upper lobe pneumonia. 3. Bibasilar bronchiectatic changes. 4. Small focus of free air is suggested in the berenice hepatis concerning for  duodenal ulcer perforation. Clinical correlation is recommended with additional  imaging as indicated.       The findings contained in this report were communicated to the emergency room  physician  by  Dr Samuel Melchor on 12/7/2022 5:39 PM.     CPT code(s) 33604           CMP   Result Value Ref Range    Sodium 131 (L) 133 - 143 mmol/L    Potassium 4.3 3.5 - 5.1 mmol/L    Chloride 101 101 - 110 mmol/L    CO2 23 21 - 32 mmol/L    Anion Gap 7 2 - 11 mmol/L    Glucose 114 (H) 65 - 100 mg/dL    BUN 16 8 - 23 MG/DL    Creatinine 1.00 0.6 - 1.0 MG/DL    Est, Glom Filt Rate 56 (L) >60 ml/min/1.73m2    Calcium 8.5 8.3 - 10.4 MG/DL    Total Bilirubin 0.6 0.2 - 1.1 MG/DL    ALT 20 12 - 65 U/L    AST 34 15 - 37 U/L    Alk Phosphatase 75 50 - 136 U/L    Total Protein 6.9 6.3 - 8.2 g/dL    Albumin 3.2 3.2 - 4.6 g/dL    Globulin 3.7 2.8 - 4.5 g/dL    Albumin/Globulin Ratio 0.9 0.4 - 1.6     CBC with Auto Differential   Result Value Ref Range    WBC 24.2 (H) 4.3 - 11.1 K/uL    RBC 4.54 4.05 - 5.2 M/uL    Hemoglobin 12.8 11.7 - 15.4 g/dL    Hematocrit 41.3 35.8 - 46.3 %    MCV 91.0 82 - 102 FL    MCH 28.2 26.1 - 32.9 PG    MCHC 31.0 (L) 31.4 - 35.0 g/dL    RDW 15.2 (H) 11.9 - 14.6 %    Platelets 955 580 - 141 K/uL    MPV 10.3 9.4 - 12.3 FL    nRBC 0.00 0.0 - 0.2 K/uL    Differential Type AUTOMATED      Seg Neutrophils 87 (H) 43 - 78 %    Lymphocytes 4 (L) 13 - 44 %    Monocytes 6 4.0 - 12.0 %    Eosinophils % 0 (L) 0.5 - 7.8 %    Basophils 0 0.0 - 2.0 %    Immature Granulocytes 3 0.0 - 5.0 %    Segs Absolute 21.0 (H) 1.7 - 8.2 K/UL    Absolute Lymph # 1.0 0.5 - 4.6 K/UL    Absolute Mono # 1.5 (H) 0.1 - 1.3 K/UL    Absolute Eos # 0.0 0.0 - 0.8 K/UL    Basophils Absolute 0.0 0.0 - 0.2 K/UL    Absolute Immature Granulocyte 0.7 (H) 0.0 - 0.5 K/UL    WBC Comment Result Confirmed By Smear      Platelet Comment ADEQUATE     Troponin   Result Value Ref Range    Troponin, High Sensitivity 12.1 0 - 14 pg/mL   Lactate, Sepsis   Result Value Ref Range    Lactic Acid, Sepsis 2.1 (H) 0.4 - 2.0 MMOL/L   EKG 12 Lead   Result Value Ref Range    Ventricular Rate 104 BPM    Atrial Rate 104 BPM    P-R Interval 138 ms    QRS Duration 78 ms    Q-T Interval 324 ms    QTc Calculation (Bazett) 426 ms    P Axis 80 degrees    R Axis 78 degrees    T Axis 52 degrees    Diagnosis Sinus tachycardia with Premature atrial complexes         CT CHEST PULMONARY EMBOLISM W CONTRAST   Final Result      1. No evidence of pulmonary embolic disease. 2. Posterior right upper lobe pneumonia. 3. Bibasilar bronchiectatic changes. 4. Small focus of free air is suggested in the berenice hepatis concerning for   duodenal ulcer perforation. Clinical correlation is recommended with additional   imaging as indicated. The findings contained in this report were communicated to the emergency room   physician  by  Dr Adi Kohler on 12/7/2022 5:39 PM.       CPT code(s) 97925                  CT ABDOMEN PELVIS W IV CONTRAST Additional Contrast? Oral    (Results Pending)                       Voice dictation software was used during the making of this note. This software is not perfect and grammatical and other typographical errors may be present. This note has not been completely proofread for errors.         Byron Gale, DO  12/07/22 1700 [facility], DO  12/07/22 1811

## 2022-12-08 LAB
ANION GAP SERPL CALC-SCNC: 8 MMOL/L (ref 2–11)
BASOPHILS # BLD: 0.1 K/UL (ref 0–0.2)
BASOPHILS NFR BLD: 0 % (ref 0–2)
BUN SERPL-MCNC: 14 MG/DL (ref 8–23)
CALCIUM SERPL-MCNC: 8.2 MG/DL (ref 8.3–10.4)
CHLORIDE SERPL-SCNC: 109 MMOL/L (ref 101–110)
CO2 SERPL-SCNC: 21 MMOL/L (ref 21–32)
CREAT SERPL-MCNC: 0.7 MG/DL (ref 0.6–1)
DIFFERENTIAL METHOD BLD: ABNORMAL
EOSINOPHIL # BLD: 0 K/UL (ref 0–0.8)
EOSINOPHIL NFR BLD: 0 % (ref 0.5–7.8)
ERYTHROCYTE [DISTWIDTH] IN BLOOD BY AUTOMATED COUNT: 15.4 % (ref 11.9–14.6)
GLUCOSE SERPL-MCNC: 99 MG/DL (ref 65–100)
HCT VFR BLD AUTO: 35.9 % (ref 35.8–46.3)
HGB BLD-MCNC: 11.2 G/DL (ref 11.7–15.4)
IMM GRANULOCYTES # BLD AUTO: 0.8 K/UL (ref 0–0.5)
IMM GRANULOCYTES NFR BLD AUTO: 4 % (ref 0–5)
LYMPHOCYTES # BLD: 0.9 K/UL (ref 0.5–4.6)
LYMPHOCYTES NFR BLD: 5 % (ref 13–44)
MAGNESIUM SERPL-MCNC: 1.6 MG/DL (ref 1.8–2.4)
MCH RBC QN AUTO: 27.9 PG (ref 26.1–32.9)
MCHC RBC AUTO-ENTMCNC: 31.2 G/DL (ref 31.4–35)
MCV RBC AUTO: 89.5 FL (ref 82–102)
MM INDURATION, POC: 0 MM (ref 0–5)
MONOCYTES # BLD: 0.6 K/UL (ref 0.1–1.3)
MONOCYTES NFR BLD: 3 % (ref 4–12)
NEUTS SEG # BLD: 17.8 K/UL (ref 1.7–8.2)
NEUTS SEG NFR BLD: 88 % (ref 43–78)
NRBC # BLD: 0 K/UL (ref 0–0.2)
PLATELET # BLD AUTO: 229 K/UL (ref 150–450)
PMV BLD AUTO: 10.5 FL (ref 9.4–12.3)
POTASSIUM SERPL-SCNC: 3.4 MMOL/L (ref 3.5–5.1)
PPD, POC: NEGATIVE
RBC # BLD AUTO: 4.01 M/UL (ref 4.05–5.2)
SODIUM SERPL-SCNC: 138 MMOL/L (ref 133–143)
WBC # BLD AUTO: 20.3 K/UL (ref 4.3–11.1)

## 2022-12-08 PROCEDURE — 85025 COMPLETE CBC W/AUTO DIFF WBC: CPT

## 2022-12-08 PROCEDURE — 2580000003 HC RX 258: Performed by: INTERNAL MEDICINE

## 2022-12-08 PROCEDURE — 97530 THERAPEUTIC ACTIVITIES: CPT

## 2022-12-08 PROCEDURE — 80048 BASIC METABOLIC PNL TOTAL CA: CPT

## 2022-12-08 PROCEDURE — 97161 PT EVAL LOW COMPLEX 20 MIN: CPT

## 2022-12-08 PROCEDURE — 97112 NEUROMUSCULAR REEDUCATION: CPT

## 2022-12-08 PROCEDURE — 6360000002 HC RX W HCPCS: Performed by: INTERNAL MEDICINE

## 2022-12-08 PROCEDURE — 6370000000 HC RX 637 (ALT 250 FOR IP): Performed by: INTERNAL MEDICINE

## 2022-12-08 PROCEDURE — 97535 SELF CARE MNGMENT TRAINING: CPT

## 2022-12-08 PROCEDURE — 36415 COLL VENOUS BLD VENIPUNCTURE: CPT

## 2022-12-08 PROCEDURE — 83735 ASSAY OF MAGNESIUM: CPT

## 2022-12-08 PROCEDURE — 1100000000 HC RM PRIVATE

## 2022-12-08 PROCEDURE — 97165 OT EVAL LOW COMPLEX 30 MIN: CPT

## 2022-12-08 RX ORDER — TRAZODONE HYDROCHLORIDE 50 MG/1
50 TABLET ORAL NIGHTLY PRN
Status: DISCONTINUED | OUTPATIENT
Start: 2022-12-08 | End: 2022-12-14 | Stop reason: SDUPTHER

## 2022-12-08 RX ORDER — PANTOPRAZOLE SODIUM 40 MG/1
40 TABLET, DELAYED RELEASE ORAL
Status: DISCONTINUED | OUTPATIENT
Start: 2022-12-08 | End: 2022-12-14 | Stop reason: HOSPADM

## 2022-12-08 RX ORDER — OXYCODONE HYDROCHLORIDE 5 MG/1
5 TABLET ORAL EVERY 4 HOURS PRN
Status: DISCONTINUED | OUTPATIENT
Start: 2022-12-08 | End: 2022-12-14 | Stop reason: HOSPADM

## 2022-12-08 RX ORDER — CALCIUM CARBONATE 200(500)MG
500 TABLET,CHEWABLE ORAL 3 TIMES DAILY PRN
Status: DISCONTINUED | OUTPATIENT
Start: 2022-12-08 | End: 2022-12-14 | Stop reason: HOSPADM

## 2022-12-08 RX ADMIN — SODIUM CHLORIDE, PRESERVATIVE FREE 10 ML: 5 INJECTION INTRAVENOUS at 21:43

## 2022-12-08 RX ADMIN — CEFTRIAXONE 1000 MG: 1 INJECTION, POWDER, FOR SOLUTION INTRAMUSCULAR; INTRAVENOUS at 16:52

## 2022-12-08 RX ADMIN — PANTOPRAZOLE SODIUM 40 MG: 40 TABLET, DELAYED RELEASE ORAL at 17:24

## 2022-12-08 RX ADMIN — HEPARIN SODIUM 5000 UNITS: 5000 INJECTION INTRAVENOUS; SUBCUTANEOUS at 08:07

## 2022-12-08 RX ADMIN — AZITHROMYCIN MONOHYDRATE 500 MG: 250 TABLET ORAL at 08:07

## 2022-12-08 RX ADMIN — HEPARIN SODIUM 5000 UNITS: 5000 INJECTION INTRAVENOUS; SUBCUTANEOUS at 21:43

## 2022-12-08 RX ADMIN — SODIUM CHLORIDE, POTASSIUM CHLORIDE, SODIUM LACTATE AND CALCIUM CHLORIDE: 600; 310; 30; 20 INJECTION, SOLUTION INTRAVENOUS at 06:35

## 2022-12-08 RX ADMIN — ACETAMINOPHEN 650 MG: 325 TABLET, FILM COATED ORAL at 23:43

## 2022-12-08 ASSESSMENT — PAIN SCALES - GENERAL
PAINLEVEL_OUTOF10: 0
PAINLEVEL_OUTOF10: 7
PAINLEVEL_OUTOF10: 0

## 2022-12-08 ASSESSMENT — PAIN DESCRIPTION - ORIENTATION: ORIENTATION: ANTERIOR;UPPER

## 2022-12-08 ASSESSMENT — PAIN - FUNCTIONAL ASSESSMENT: PAIN_FUNCTIONAL_ASSESSMENT: PREVENTS OR INTERFERES SOME ACTIVE ACTIVITIES AND ADLS

## 2022-12-08 ASSESSMENT — PAIN DESCRIPTION - DESCRIPTORS: DESCRIPTORS: ACHING

## 2022-12-08 ASSESSMENT — PAIN DESCRIPTION - LOCATION: LOCATION: ABDOMEN

## 2022-12-08 NOTE — PROGRESS NOTES
Patient admitted yesterday by the on-call  Notes persistent weakness and feeling unwell-however feeling slightly better since admission  Also notes shortness of breath-however maintaining sats on supplemental oxygen but not documented on her vitals flowsheet-we will follow-up with nursing  Continue antibiotics  Replace mag  Replace K  Anticipate at least 3 midnights pending culture results  No charge for this review

## 2022-12-08 NOTE — PROGRESS NOTES
END OF SHIFT NOTE:    INTAKE/OUTPUT  12/07 0701 - 12/08 0700  In: 1219.3 [I.V.:919.3]  Out: 300 [Urine:300]  Voiding: Yes  Catheter: No          Flatus: Patient does have flatus present. Stool:  1 occurrences. Characteristics: Formed  Amount: Small  Source: Rectum  Color: Brown  Last BM (including prior to admit): 12/07/2022       Emesis: 0 occurrences. VITAL SIGNS  Patient Vitals for the past 12 hrs:   Temp Pulse Resp BP SpO2   12/08/22 0352 98.4 °F (36.9 °C) 64 18 113/62 93 %   12/07/22 2244 -- 81 -- (!) 92/49 --   12/07/22 2235 98.2 °F (36.8 °C) 81 16 (!) 92/49 97 %   12/07/22 2155 -- 88 23 (!) 100/50 92 %   12/07/22 2115 -- 84 23 -- 93 %   12/07/22 2100 -- 88 24 (!) 96/48 95 %   12/07/22 2045 -- 90 24 (!) 96/49 --   12/07/22 2030 -- 89 25 (!) 92/46 92 %   12/07/22 2015 -- 90 23 (!) 88/45 93 %   12/07/22 2000 -- 94 23 (!) 93/46 92 %   12/07/22 1945 -- 91 22 (!) 88/45 95 %   12/07/22 1939 98.3 °F (36.8 °C) -- -- -- --   12/07/22 1930 -- 97 20 90/69 94 %   12/07/22 1915 -- (!) 102 22 (!) 96/53 95 %   12/07/22 1900 -- (!) 101 14 (!) 106/52 --   12/07/22 1839 -- (!) 101 23 (!) 111/49 --   12/07/22 1829 -- (!) 109 29 111/67 92 %   12/07/22 1824 -- (!) 108 25 109/78 92 %   12/07/22 1809 -- 100 20 -- 91 %   12/07/22 1808 -- (!) 102 18 (!) 95/57 91 %       Ambulating  Yes    Shift report given to oncoming nurse at the bedside.     Radha Salas RN

## 2022-12-08 NOTE — ED NOTES
Pt has room assigned in hospital. Report called to accepting LIZZETTE Alvarado. Pt transport requested.       Edgard Fischer RN  12/07/22 2127

## 2022-12-08 NOTE — PROGRESS NOTES
TRANSFER - IN REPORT:    Verbal report received from [address] [place] [address], RN on [facility]  being received from ER for routine progression of patient care      Report consisted of patient's Situation, Background, Assessment and   Recommendations(SBAR). Information from the following report(s) ED SBAR, Adult Overview, MAR, and Recent Results was reviewed with the receiving nurse. Opportunity for questions and clarification was provided. Assessment to be completed upon patient's arrival to unit and care assumed.

## 2022-12-08 NOTE — PROGRESS NOTES
ACUTE PHYSICAL THERAPY GOALS:   (Developed with and agreed upon by patient and/or caregiver.)    (1.) Katy Alvarezlinax  will move from supine to sit and sit to supine , scoot up and down, and roll side to side with MODIFIED INDEPENDENCE within 7 treatment day(s). (2.) Katy Thelma will transfer from bed to chair and chair to bed with MODIFIED INDEPENDENCE using the least restrictive device within 7 treatment day(s). (3.) Katy Thelma will ambulate with SUPERVISION for 500 feet with the least restrictive device within 7 treatment day(s). (4.) Katy Thelma will perform standing static and dynamic balance activities x 10 minutes with SUPERVISION to improve safety within 7 treatment day(s). (5.) Katy Thelma will perform bilateral lower extremity exercises x 15 min for HEP with MODIFIED INDEPENDENCE to improve strength, endurance, and functional mobility within 7 treatment day(s). PHYSICAL THERAPY Initial Assessment, Daily Note, and AM  (Link to Caseload Tracking: PT Visit Days : 1  Acknowledge Orders  Time In/Out  PT Charge Capture  Rehab Caseload Tracker    Dariel Renee is a 80 y.o. female   PRIMARY DIAGNOSIS: Severe sepsis ([facility]  Peritoneal free air [K66.8]  Severe sepsis ([address] [A41.9, R65.20]  Pneumonia of right upper lobe due to infectious organism [J18.9]  Chest pain, unspecified type [R07.9]  Sepsis, due to unspecified organism, unspecified whether acute organ dysfunction present ([address] [A41.9]       Reason for Referral: Generalized Muscle Weakness (M62.81)  Inpatient: Payor: [employer] MEDICARE / Plan: [employer] GOLD PLUS HMO / Product Type: *No Product type* /     ASSESSMENT:     REHAB RECOMMENDATIONS:   Recommendation to date pending progress:  Setting:  Home Health Therapy    Equipment:    None     ASSESSMENT:  Ms. Brittani Walker is a 80year old female who presents with SOB, fever, and R sided chest pain and is now being treated for sepsis secondary to pneumonia.  At baseline she lives with her  and performs all mobility independently with no AD. She still drives and reports she likes to be active and garden. This date pt performs mobility including bed mobility, transfers, standing tasks, and ambulation with SBA/CGA. She is currently on 2L O2 and O2 drops to 89% following ambulation but quickly returns to 92% with rest. Pt presents as functioning below her baseline, with deficits in mobility including transfers, gait, balance, and activity tolerance. Pt will benefit from skilled therapy services to address stated deficits to promote return to highest level of function, independence, and safety. Will continue to follow.       [address] [facility] [address] [address] AM-PAC 6 Clicks Basic Mobility Inpatient Short Form  AM-PAC Mobility Inpatient   How much difficulty turning over in bed?: A Little  How much difficulty sitting down on / standing up from a chair with arms?: A Little  How much difficulty moving from lying on back to sitting on side of bed?: A Little  How much help from another person moving to and from a bed to a chair?: A Little  How much help from another person needed to walk in hospital room?: A Little  How much help from another person for climbing 3-5 steps with a railing?: A Little  [facility] Inpatient Mobility Raw Score : 18  AM-PAC Inpatient T-Scale Score : 43.63  Mobility Inpatient CMS 0-100% Score: 46.58  Mobility Inpatient CMS G-Code Modifier : CK    SUBJECTIVE:   Ms. Miguel Ángel Palmer states, \"I have a vegetable garden at [employer]"     Social/Functional Lives With: Spouse  Type of Home: House  Home Layout: One level  Home Access: Ramped entrance  ADL Assistance: Independent  Homemaking Assistance: Independent  Ambulation Assistance: Independent    OBJECTIVE:     PAIN: VITALS / O2: PRECAUTION / Mala Chatman / Hansa Cough:   Pre Treatment: 0/10         Post Treatment: 0/10 Vitals        Oxygen: 93% at rest; 89% after ambulation; 92% following seated rest all on 2L O2 IV    RESTRICTIONS/PRECAUTIONS:  Restrictions/Precautions: Fall Risk                 GROSS EVALUATION: Intact Impaired (Comments):   AROM [x]     PROM [x]    Strength []  Grossly 4/5 in BLE    Balance [] Sitting - Static: Good  Sitting - Dynamic: Good  Standing - Static: Fair, +  Standing - Dynamic: Fair   Posture [] Forward Head  Rounded Shoulders   Sensation []     Coordination []      Tone []     Edema []    Activity Tolerance []  Limited; fatigues quickly on 2L O2    []      COGNITION/  PERCEPTION: Intact Impaired (Comments):   Orientation [x]     Vision [x]     Hearing [x]     Cognition  [x]       MOBILITY: I Mod I S SBA CGA Min Mod Max Total  NT x2 Comments:   Bed Mobility    Rolling [] [] [] [x] [] [] [] [] [] [] []    Supine to Sit [] [] [] [x] [] [] [] [] [] [] []    Scooting [] [] [] [x] [] [] [] [] [] [] []    Sit to Supine [] [] [] [] [] [] [] [] [] [] []    Transfers    Sit to Stand [] [] [] [x] [] [] [] [] [] [] []    Bed to Chair [] [] [] [x] [x] [] [] [] [] [] []    Stand to Sit [] [] [] [x] [x] [] [] [] [] [] []     [] [] [] [] [] [] [] [] [] [] []    I=Independent, Mod I=Modified Independent, S=Supervision, SBA=Standby Assistance, CGA=Contact Guard Assistance,   Min=Minimal Assistance, Mod=Moderate Assistance, Max=Maximal Assistance, Total=Total Assistance, NT=Not Tested    GAIT: I Mod I S SBA CGA Min Mod Max Total  NT x2 Comments:   Level of Assistance [] [] [] [x] [] [] [] [] [] [] []    Distance 250 feet    DME None    Gait Quality Decreased joceline , Decreased step clearance, and Decreased step length    Weightbearing Status      Stairs      I=Independent, Mod I=Modified Independent, S=Supervision, SBA=Standby Assistance, CGA=Contact Guard Assistance,   Min=Minimal Assistance, Mod=Moderate Assistance, Max=Maximal Assistance, Total=Total Assistance, NT=Not Tested    PLAN:   FREQUENCY AND DURATION: 3 times/week for duration of hospital stay or until stated goals are met, whichever comes first.    THERAPY PROGNOSIS: Good    PROBLEM LIST:   (Skilled intervention is medically necessary to address:)  Decreased Activity Tolerance  Decreased AROM/PROM  Decreased Balance  Decreased Cognition  Decreased Coordination  Decreased Gait Ability  Decreased Safety Awareness  Decreased Strength  Decreased Transfer Abilities INTERVENTIONS PLANNED:   (Benefits and precautions of physical therapy have been discussed with the patient.)  Therapeutic Activity  Therapeutic Exercise/HEP  Neuromuscular Re-education  Gait Training  Education       TREATMENT:   EVALUATION: LOW COMPLEXITY: (Untimed Charge)    TREATMENT:   Therapeutic Activity (10 Minutes): Therapeutic activity included Rolling, Supine to Sit, Scooting, Transfer Training, Ambulation on level ground, Sitting balance , and Standing balance to improve functional Activity tolerance, Balance, Coordination, Mobility, and Strength.     TREATMENT GRID:  N/A    AFTER TREATMENT PRECAUTIONS: Bed/Chair Locked, Call light within reach, Chair, and Needs within reach    INTERDISCIPLINARY COLLABORATION:  RN/ PCT    EDUCATION: Education Given To: Patient  Education Provided: Role of Therapy;Plan of Care;Energy Conservation  Education Method: Verbal  Barriers to Learning: None  Education Outcome: Verbalized understanding    TIME IN/OUT:  Time In: 1204  Time Out: [place]  Minutes: [place], [place]

## 2022-12-08 NOTE — H&P
Hospitalist History and Physical   Admit Date:  2022  3:15 PM   Name:  Karrie Hines   Age:  80 y.o. Sex:  female  :  1940   MRN:  [id number]   Room:  [place] [address]    Presenting Complaint: Chest Pain     Reason(s) for Admission: Severe sepsis ([facility] [A41.9, R65.20]     History of Present Illness:   Karrie Hines is a 80 y.o. female with medical history of Afib s/p PPM (not on AC) presents with sudden onset worsening shortness of breath and fevers that started earlier today. She says \"I have had pneumonia in the past a few times and this feels like it. \" Denies any sick contacts with family or neighbors. Denies chest pain. Says \"I have just been so tired and I could sleep all day. \" Does have some shortness of breath. Denies abdominal pain, nausea/vomiting or diarrhea. Not sure but believes she received two \"pneumonia vaccines in the past\" from her PCP. Only takes OTC medications (vitamin D, biotin). Does not take any prescription medications at all. Of note, incidental finding of possible duodenal perforation seen on CT imaging (was originally ordered to evaluate for PE). Patient does have \"some reflux whenever I eat something acidic\" but denies nausea/vomiting or diarrhea. She has has not had any problems with eating or drinking and denies abdominal pain. ED Course: Sodium of 131. LA of 2.1. WBC count of 24k. Blood cultures collected x2. CT chest negative for PE. Initial Ct chest also suggested duodenal perforation and Flagyl added to antibiotic regimen; however, dedicated CT abd/pelvis ordered to evaluate for perforation and is negative for any intra-abdominal pathology. Given broad spectrum antibiotics and fluids. Hospitalist consulted for admission. Review of Systems:  10 systems reviewed and negative except as noted in HPI.   Assessment & Plan:     Principal Problem:    Severe sepsis ([facility]  - Due to RLL pneumonia  - follow blood cultures  - stop Flagyl  - CTX empirically   - azithromycin for atypical coverage  - Legionella and pneumococcus urine antigens ordered  - IVF resuscitation    # Acute hypoxic respiratory failure  - CT chest negative for PE  - likely due to above  - management as above  - jet nebs as needed  - no smoking history and no history of pulmonary issues  - wean supplemental oxygen as tolerated  - goal SpO2>90%    # Hyponatremia  - likely related to above and poor po intake  - fluids as above  - monitor with AM labs      Anticipated discharge needs:     PT/OT and PPD ordered. Diet: ADULT DIET; Regular  VTE ppx: heparin   Code status: DNR    Hospital Problems:  Principal Problem:    Severe sepsis ([address]  Active Problems:    Acute respiratory failure with hypoxemia (HCC)    Hyponatremia  Resolved Problems:    * No resolved hospital problems. *       Past History:     Past medical history: as above    Past surgical history: pacemaker placement    Social history: never smoker    Family history: positive for high blood pressure    Objective:   Patient Vitals for the past 24 hrs:   Temp Pulse Resp BP SpO2   12/07/22 1839 -- (!) 101 23 (!) 111/49 --   12/07/22 1829 -- (!) 109 29 111/67 92 %   12/07/22 1824 -- (!) 108 25 109/78 92 %   12/07/22 1809 -- 100 20 -- 91 %   12/07/22 1808 -- (!) 102 18 (!) 95/57 91 %   12/07/22 1749 -- (!) 103 21 (!) 129/96 --   12/07/22 1735 -- (!) 103 27 -- 91 %   12/07/22 1640 (!) 102.4 °F (39.1 °C) -- -- -- --   12/07/22 1635 -- 97 23 (!) 105/48 92 %   12/07/22 1512 97.7 °F (36.5 °C) (!) 103 17 102/61 95 %       Oxygen Therapy  SpO2: 92 %  Pulse via Oximetry: 108 beats per minute  O2 Device: Nasal cannula  O2 Flow Rate (L/min): 2 L/min    Estimated body mass index is 19.19 kg/m² as calculated from the following:    Height as of this encounter: 4' 11\" (1.499 m). Weight as of this encounter: 95 lb (43.1 kg).     Intake/Output Summary (Last 24 hours) at 12/7/2022 1926  Last data filed at 12/7/2022 1801  Gross per 24 hour   Intake 300 ml   Output --   Net 300 ml         Physical Exam:    Blood pressure (!) 111/49, pulse (!) 101, temperature (!) 102.4 °F (39.1 °C), temperature source Axillary, resp. rate 23, height 4' 11\" (1.499 m), weight 95 lb (43.1 kg), SpO2 92 %. General:    Well nourished. Very pleasant  Head:  Normocephalic, atraumatic  Eyes:  Sclerae appear normal.  Pupils equally round. ENT:  Nares appear normal, no drainage. Moist oral mucosa  Neck:  No restricted ROM. Trachea midline   CV:   RRR. No jugular venous distension. Lungs:   CTAB. No wheezing, rhonchi, or rales. Symmetric expansion. Abdomen: Bowel sounds present. Soft, nontender, nondistended. Extremities: No cyanosis or clubbing. No edema  Skin:     No rashes and normal coloration. Warm and dry. Neuro:  CN II-XII grossly intact. Sensation intact. A&Ox3  Psych:  Normal mood and affect.       I have personally reviewed labs and tests showing:  Recent Labs:  Recent Results (from the past 24 hour(s))   EKG 12 Lead    Collection Time: 12/07/22  3:11 PM   Result Value Ref Range    Ventricular Rate 104 BPM    Atrial Rate 104 BPM    P-R Interval 138 ms    QRS Duration 78 ms    Q-T Interval 324 ms    QTc Calculation (Bazett) 426 ms    P Axis 80 degrees    R Axis 78 degrees    T Axis 52 degrees    Diagnosis Sinus tachycardia with Premature atrial complexes    CMP    Collection Time: 12/07/22  3:32 PM   Result Value Ref Range    Sodium 131 (L) 133 - 143 mmol/L    Potassium 4.3 3.5 - 5.1 mmol/L    Chloride 101 101 - 110 mmol/L    CO2 23 21 - 32 mmol/L    Anion Gap 7 2 - 11 mmol/L    Glucose 114 (H) 65 - 100 mg/dL    BUN 16 8 - 23 MG/DL    Creatinine 1.00 0.6 - 1.0 MG/DL    Est, Glom Filt Rate 56 (L) >60 ml/min/1.73m2    Calcium 8.5 8.3 - 10.4 MG/DL    Total Bilirubin 0.6 0.2 - 1.1 MG/DL    ALT 20 12 - 65 U/L    AST 34 15 - 37 U/L    Alk Phosphatase 75 50 - 136 U/L    Total Protein 6.9 6.3 - 8.2 g/dL    Albumin 3.2 3.2 - 4.6 g/dL    Globulin 3.7 2.8 - 4.5 g/dL    Albumin/Globulin Ratio 0.9 0.4 - 1.6     CBC with Auto Differential    Collection Time: 12/07/22  3:32 PM   Result Value Ref Range    WBC 24.2 (H) 4.3 - 11.1 K/uL    RBC 4.54 4.05 - 5.2 M/uL    Hemoglobin 12.8 11.7 - 15.4 g/dL    Hematocrit 41.3 35.8 - 46.3 %    MCV 91.0 82 - 102 FL    MCH 28.2 26.1 - 32.9 PG    MCHC 31.0 (L) 31.4 - 35.0 g/dL    RDW 15.2 (H) 11.9 - 14.6 %    Platelets 157 524 - 547 K/uL    MPV 10.3 9.4 - 12.3 FL    nRBC 0.00 0.0 - 0.2 K/uL    Differential Type AUTOMATED      Seg Neutrophils 87 (H) 43 - 78 %    Lymphocytes 4 (L) 13 - 44 %    Monocytes 6 4.0 - 12.0 %    Eosinophils % 0 (L) 0.5 - 7.8 %    Basophils 0 0.0 - 2.0 %    Immature Granulocytes 3 0.0 - 5.0 %    Segs Absolute 21.0 (H) 1.7 - 8.2 K/UL    Absolute Lymph # 1.0 0.5 - 4.6 K/UL    Absolute Mono # 1.5 (H) 0.1 - 1.3 K/UL    Absolute Eos # 0.0 0.0 - 0.8 K/UL    Basophils Absolute 0.0 0.0 - 0.2 K/UL    Absolute Immature Granulocyte 0.7 (H) 0.0 - 0.5 K/UL    WBC Comment Result Confirmed By Smear      Platelet Comment ADEQUATE     Troponin    Collection Time: 12/07/22  3:32 PM   Result Value Ref Range    Troponin, High Sensitivity 12.1 0 - 14 pg/mL   Lactate, Sepsis    Collection Time: 12/07/22  4:40 PM   Result Value Ref Range    Lactic Acid, Sepsis 2.1 (H) 0.4 - 2.0 MMOL/L   Lactate, Sepsis    Collection Time: 12/07/22  6:39 PM   Result Value Ref Range    Lactic Acid, Sepsis 2.6 (H) 0.4 - 2.0 MMOL/L       I have personally reviewed imaging studies showing:  CT ABDOMEN PELVIS W IV CONTRAST Additional Contrast? Oral    Result Date: 12/7/2022  Exam: CT ABDOMEN PELVIS W IV CONTRAST on 12/7/2022 7:03 PM Clinical History: The Female patient is 80years old  presenting for concern for free air in abdomen Technique: Thin slice axial images were obtained through the abdomen and pelvis with intravenous and with oral contrast.  Coronal reformatted images were also provided for review.  A total of 100 ml of Iopamidol (ISOVUE-370) 76 % contrast was administered intravenously. All CT scans at this facility are performed using dose reduction/dose modulation techniques, as appropriate the performed exam, including the following: Automated Exposure Control; Adjustment of the mA and/or kV according to patient size (this includes techniques or standardized protocols for targeted exams where dose is matched to indication/reason for exam); and Use of Iterative Reconstruction Technique. Radiation Exposure Indices: Reference Air Kerma (Marian Cole) = 466 mGy-cm COMPARISON:  Chest CT 12/7/2022. FINDINGS:  Abdomen: Liver: Normal size, contour, density and enhancement without focal mass. Biliary: Unremarkable gallbladder. No evidence of intra or extrahepatic biliary dilatation. Pancreas: Normal in size and contour without focal lesion. Spleen: Normal in size and contour without focal lesion. Adrenal glands: Normal in size without focal lesion. Kidneys: Symmetric without evidence of hydronephrosis or nephrolithiasis. Normal enhancement throughout all visualized phases of contrast excretion. 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 Retroperitoneum/vasculature: No evidence of significant adenopathy. Mild aortoiliac atherosclerotic disease without evidence of aneurysmal dilatation. Abdominal soft tissues: Unremarkable. Osseous structures: Age-indeterminate moderate L2 compression fracture deformity. Pelvis: Unremarkable bladder. No significant adenopathy or free fluid. Loreatha Press Hysterectomy. 1. No acute intra-abdominal or pelvic abnormality. 2. 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 3. Age-indeterminate moderate L2 compression fracture deformity.  CPT code(s): A5244248, [id number]    CT CHEST PULMONARY EMBOLISM W CONTRAST    Result Date: 12/7/2022  Exam: CT CHEST PULMONARY EMBOLISM W CONTRAST on 12/7/2022 5:28 PM Clinical History: The Female patient is 80years old  presenting for right sided chest pain, tachycardia. Technique: Thin section axial images were obtained through the chest with intravenous contrast.  Coronal reformatted images were also provided for review. A total of 100 ml of Iopamidol (ISOVUE-370) 76 % contrast was administered intravenously. All CT scans at this facility are performed using dose reduction/dose modulation techniques, as appropriate the performed exam, including the following: Automated Exposure Control; Adjustment of the mA and/or kV according to patient size (this includes techniques or standardized protocols for targeted exams where dose is matched to indication/reason for exam); and Use of Iterative Reconstruction Technique. Radiation Exposure Indices: Reference Air Kerma (Ka,r) = 176 mGy-cm Comparison:  None. FINDINGS: Images through the lungs demonstrate consolidative changes in the posterior right upper lobe. There are bronchiectatic changes at the lung bases. Normal vascular enhancement is demonstrated. No evidence of aortic dissection or aneurysmal dilatation is seen. There is no evidence of pulmonary embolic disease. There is no significant hilar or mediastinal lymphadenopathy. Images chest wall structures are unremarkable in appearance. Images through the upper abdomen demonstrate a small focus of air in the berenice hepatis concerning for possible duodenal ulcer perforation. There is a large partially imaged cyst in the left mid kidney measuring at least 6 cm. There are no acute osseous abnormalities. 1. No evidence of pulmonary embolic disease. 2. Posterior right upper lobe pneumonia. 3. Bibasilar bronchiectatic changes. 4. Small focus of free air is suggested in the berenice hepatis concerning for duodenal ulcer perforation.  Clinical correlation is recommended with additional imaging as indicated. The findings contained in this report were communicated to the emergency room physician  by  Dr Adi Kohler on 12/7/2022 5:39 PM. CPT code(s) 76224       Echocardiogram:  No results found for this or any previous visit.         Orders Placed This Encounter   Medications    0.9 % sodium chloride bolus    sodium chloride flush 0.9 % injection 10 mL    iopamidol (ISOVUE-370) 76 % injection 100 mL    cefTRIAXone (ROCEPHIN) 1,000 mg in sodium chloride 0.9 % 50 mL IVPB mini-bag     Order Specific Question:   Antimicrobial Indications     Answer:   Sepsis of Unknown Etiology    azithromycin (ZITHROMAX) 500 mg in sodium chloride 0.9 % 250 mL IVPB ([id number])     Order Specific Question:   Antimicrobial Indications     Answer:   Sepsis of Unknown Etiology    acetaminophen (TYLENOL) tablet 650 mg    diatrizoate meglumine-sodium (GASTROGRAFIN) 66-10 % solution 15 mL    metronidazole (FLAGYL) 500 mg in 0.9% NaCl 100 mL IVPB premix     Order Specific Question:   Antimicrobial Indications     Answer:   Intra-Abdominal Infection    sodium chloride flush 0.9 % injection 10 mL    0.9 % sodium chloride bolus    iopamidol (ISOVUE-370) 76 % injection 80 mL    0.9 % sodium chloride bolus    0.9 % sodium chloride bolus    cefTRIAXone (ROCEPHIN) 1,000 mg in sodium chloride 0.9 % 50 mL IVPB mini-bag     Order Specific Question:   Antimicrobial Indications     Answer:   Pneumonia (CAP)     Order Specific Question:   CAP duration of therapy     Answer:   5 days    azithromycin (ZITHROMAX) tablet 500 mg     Order Specific Question:   Antimicrobial Indications     Answer:   Pneumonia (CAP)     Order Specific Question:   CAP duration of therapy     Answer:   5 days    albuterol (PROVENTIL) nebulizer solution 2.5 mg     Order Specific Question:   Initiate RT Bronchodilator Protocol     Answer:   Yes - Inpatient Protocol    tuberculin injection 5 Units    lactated ringers infusion         Signed:  AGUSTO WILKINS DO    Part of this note may have been written by using a voice dictation software. The note has been proof read but may still contain some grammatical/other typographical errors.

## 2022-12-08 NOTE — PROGRESS NOTES
Ruth Orr, met with patient in room 207 to discuss discharge planning. Patient is alert, lying in bed, and on [facility]. Patient does not use home oxygen. Patient lives with spouse in one level home with ramped entry. Patient is independent in ADLs and drives. Patient has had home health in the past.  Patient has a walker at home if needed. Demographics and PCP (Dr. Juan Alberto Adam [phone]) verified with patient. Patient uses [address] CM following. ASSESSMENT NOTE    Attending Physician: Vinicio Rodriguez MD  Admit Problem: Peritoneal free air [K66.8]  Severe sepsis ([facility] [address] [A41.9, R65.20]  Pneumonia of right upper lobe due to infectious organism [J18.9]  Chest pain, unspecified type [R07.9]  Sepsis, due to unspecified organism, unspecified whether acute organ dysfunction present [facility]) [A41.9]  Date/Time of Admission: 12/7/2022  3:15 PM  Problem List:  Patient Active Problem List   Diagnosis    Severe sepsis ([address]    Acute respiratory failure with hypoxemia ([facility] [address]    Hyponatremia       Service Assessment  Patient Orientation Alert and Oriented   Cognition Alert   History Provided By Patient   Primary Caregiver Self   Accompanied By/Relationship     Reinaldo Cerda [address] is: Legal Next of Herlinda 69   PCP Verified by CM Yes (Dr. Juan Alberto Adam [phone])   Last Visit to PCP Within last 3 months   Prior Functional Level Independent in ADLs/IADLs   Current Functional Level Independent in ADLs/IADLs   Can patient return to prior living arrangement Yes   Ability to make needs known: Good   Family able to assist with home care needs:     Would you like for me to discuss the discharge plan with any other family members/significant others, and if so, who?  Yes (Spouse - Daniel)   Financial Resources Medicare ([employer])   Community Resources     CM/SW Referral       Social/Functional History  Lives With Spouse   Type of [address] One level   Home Access Ramped entrance   Entrance Stairs - Number of Steps     Entrance Stairs - Rails     Bathroom Shower/Tub     Bathroom Toilet     Bathroom Equipment     Bathroom Accessibility     Home Equipment     Receives Help From Family   ADL Assistance Independent   Bath     Dressing     Grooming     Feeding     Toileting     [address]     Meal Prep     Laundry     Vacuuming     Cleaning     Gardening     Yard Work     Driving     Shopping          Other (Comment)     Homemaking Responsibilities     Meal Prep Responsibility     Laundry Responsibility     Cleaning Responsibility     Bill Paying/Finance [address]     Other (Comment)     Ambulation Assistance Independent   Transfer Assistance     Active  Yes   Patient's  Info     Mode of Transportation Car   Education     Occupation Retired   Type of Occupation       Discharge [address] Spouse/Significant Other ([address])   [facility] Spouse/Significant Other   Current Services Prior To Admission [address] [facility]   DME [facility]   DME     DME Ordered? No   Potential Assistance Purchasing Medications No   Meds-to-Beds: Does the patient want to have any new prescriptions delivered to bedside prior to discharge? Type of Home Care Services None   Patient expects to be discharged to: House   Follow Up Appointment: Best Day/Time     One/Two Story Residence: One story   # of Interior Steps     Height of Each Step (in)     [employer] Available     History of Falls?  No     Services At/After Discharge  Transition of Care Consult (CM Consult): Discharge Planning   Internal Home Health     Internal Hospice     Reason Outside Agency [address]     Partner SNF     Reason Why Partner SNF Not Chosen     Internal Comfort Care Reason Outside [address] Discharge None    Resource Information Provided? No   Mode of Transport at Discharge [address] [facility] Time of Discharge     Confirm Follow Up Transport Self     Condition of Participation: Discharge Planning  The plan for Transition of Care is related to the following treatment goals: Return home with family support   The Patient and/or Patient Representative was provided with a Choice of Provider? Patient   Name of the Patient Representative who was provided with the Choice of Provider and agrees with the Discharge Plan? The Patient and/or Patient Representative Agree with the Discharge Plan? Yes   Freedom of Choice list was provided with basic dialogue that supports the individualized plan of care/goals, treatment preferences, and shares the quality data associated with the providers?  Yes     Documentation for Discharge Appeal  Discharge Appealed by     Date notified by [employer] of appeal request:     Time notified by [facility]IO of appeal request:     Detailed Notice of Discharge given to:     Date Notice of Discharge given:     Time Notice of Discharge given:     Date records sent to [address]     Time records sent to [address]     Date Notified of Outcome     Time Notified of Outcome     Outcome of appeal           Edmund Joyner RN 12/08/22 9:42 AM'

## 2022-12-08 NOTE — PROGRESS NOTES
ACUTE OCCUPATIONAL THERAPY GOALS:   (Developed with and agreed upon by patient and/or caregiver.)  1. Pt will complete LB ADL set up only with AE as needed. 2. Pt will complete toileting supervision only with AE as needed. 3. Pt will complete UB ADL set up only. 4. Pt will tolerate 25 minutes of OT treatment requiring 1-2 breaks as needed. 5. Pt will complete grooming tasks while standing at sink supervision. 6. Pt will complete functional mobility supervision no AD. 7. Pt will tolerate BUE exercises to increase strength for safe, functional transfers, ADL participation. 8. Pt will demonstrate proper use of energy conservation techniques during therapeutic activity and/or exercise. OCCUPATIONAL THERAPY Initial Assessment       OT Visit Days: 1  Acknowledge Orders  Time  OT Charge Capture  Rehab Caseload Tracker      Naya Renee is a 80 y.o. female   PRIMARY DIAGNOSIS: Severe sepsis ([address]  Peritoneal free air [K66.8]  Severe sepsis ([address] [A41.9, R65.20]  Pneumonia of right upper lobe due to infectious organism [J18.9]  Chest pain, unspecified type [R07.9]  Sepsis, due to unspecified organism, unspecified whether acute organ dysfunction present ([address] [A41.9]       Reason for Referral: Generalized Muscle Weakness (M62.81)  Inpatient: Payor: David Ham / Plan: [employer] G[employer]US HMO / Product Type: *No Product type* /     ASSESSMENT:     REHAB RECOMMENDATIONS:   Recommendation to date pending progress:  Setting:  Home Health Therapy    Equipment:    To Be Determined     ASSESSMENT:  Ms. Tiffanie Rice is a 80 y F with a hx of afib. Pt was admitted for severe sepsis. Pt was received supine in bed on 2L O2 NC, 92%. Pt does not wear O2 at home. Pt with frequent bowel movements and lying in one upon arrival. Pt required assistance for functional mobility and ADLs today due to generalized weakness, fatigue, oxygen requirements. Pt used RW today and does not use one at baseline. Pt impulsive at times.  Pt's O2 levels stayed above 90% on 2L during session. Pt brushed teeth standing EOS on RA and O2 dropped to 91%. O2 replaced. Pt educated in pursed lip breathing needing multiple v/c to do so during therapeutic activity. Pt has deficits in strength, balance, activity tolerance, and performing ADLs. Pt requires continued skilled OT services due to performing functionally below baseline. [address] [facility] [address] [address] [facility] Clicks Daily Activity Inpatient Short Form:    AM-PAC Daily Activity Inpatient   How much help for putting on and taking off regular lower body clothing?: A Little  How much help for Bathing?: A Little  How much help for Toileting?: A Little  How much help for putting on and taking off regular upper body clothing?: None  How much help for taking care of personal grooming?: None  How much help for eating meals?: None  [facility] Inpatient Daily Activity Raw Score: 21  AM-PAC Inpatient ADL T-Scale Score : 44.27  ADL Inpatient CMS 0-100% Score: 32.79  ADL Inpatient CMS G-Code Modifier : CJ           SUBJECTIVE:     Ms. Moustapha Singh states, \"That's like telling a skunk not to stink. \"     Social/Functional Lives With: Spouse  Type of Home: House  Home Layout: One level  Home Access: Ramped entrance  ADL Assistance: Independent  Homemaking Assistance: Independent  Ambulation Assistance: Independent    OBJECTIVE:     Rosmery Olvera / Aguila Collins / Josselyn Faster: IV    RESTRICTIONS/PRECAUTIONS:  Restrictions/Precautions: Fall Risk    PAIN: VITALS / O2:   Pre Treatment:   Pain Assessment: None - Denies Pain      Post Treatment: none       Vitals          Oxygen   WFL on 2L         GROSS EVALUATION: INTACT IMPAIRED   (See Comments)   UE AROM [x] []   UE PROM [] []   Strength []  Generally decreased     Posture / Balance [] Standing - Dynamic: Fair, +   Sensation [x]     Coordination [x]       Tone [x]       Edema [x]    Activity Tolerance [] Patient limited by fatigue, Patient Tolerated treatment well     Hand Dominance R [] L [] COGNITION/  PERCEPTION: INTACT IMPAIRED   (See Comments)   Orientation [x]     Vision [x]     Hearing [x]     Cognition  [x]     Perception []       MOBILITY: I Mod I S SBA CGA Min Mod Max Total  NT x2 Comments:   Bed Mobility    Rolling [] [] [] [] [] [] [] [] [] [] []    Supine to Sit [] [] [x] [] [] [] [] [] [] [] []    Scooting [] [] [x] [] [] [] [] [] [] [] []    Sit to Supine [] [] [] [] [] [] [] [] [] [] []    Transfers    Sit to Stand [] [] [] [x] [x] [] [] [] [] [] []    Bed to Chair [] [] [] [x] [x] [] [] [] [] [] []    Stand to Sit [] [] [] [x] [x] [] [] [] [] [] []    Tub/Shower [] [] [] [] [] [] [] [] [] [] []     Toilet [] [] [] [x] [x] [] [] [] [] [] []      [] [] [] [] [] [] [] [] [] [] []    I=Independent, Mod I=Modified Independent, S=Supervision/Setup, SBA=Standby Assistance, CGA=Contact Guard Assistance, Min=Minimal Assistance, Mod=Moderate Assistance, Max=Maximal Assistance, Total=Total Assistance, NT=Not Tested    ACTIVITIES OF DAILY LIVING: I Mod I S SBA CGA Min Mod Max Total NT Comments   BASIC ADLs:              Upper Body Bathing  [] [] [x] [] [] [] [] [] [] [] Seated in chair due to fatigue wipe bath   Lower Body Bathing [] [] [x] [] [] [] [] [] [] []    Toileting [] [] [] [] [x] [] [] [] [] [] On toilet with rails, standing at RW CGA for balance, brief mngt   Upper Body Dressing [] [] [x] [] [] [] [] [] [] [] Button up gown seated   Lower Body Dressing [] [] [x] [] [] [] [] [] [] [] Slipping on slid on seated   Feeding [] [] [x] [] [] [] [] [] [] [] Drinking water seated   Grooming [] [] [] [x] [x] [] [] [] [] [] Standing EOS brushing teeth   Personal Device Care [] [] [] [] [] [] [] [] [] []    Functional Mobility [] [] [] [x] [x] [] [] [] [] [] RW bed > toilet > chair > standing EOS > chair   I=Independent, Mod I=Modified Independent, S=Supervision/Setup, SBA=Standby Assistance, CGA=Contact Guard Assistance, Min=Minimal Assistance, Mod=Moderate Assistance, Max=Maximal Assistance, Total=Total Assistance, NT=Not Tested    PLAN:   FREQUENCY/DURATION   OT Plan of Care: 3 times/week for duration of hospital stay or until stated goals are met, whichever comes first.    PROBLEM LIST:   (Skilled intervention is medically necessary to address:)  Decreased ADL/Functional Activities  Decreased Activity Tolerance  Decreased Balance  Decreased Gait Ability  Decreased Safety Awareness  Decreased Strength  Decreased Transfer Abilities   INTERVENTIONS PLANNED:  (Benefits and precautions of occupational therapy have been discussed with the patient.)  Self Care Training  Therapeutic Activity  Therapeutic Exercise/HEP  Neuromuscular Re-education  Gait Training  Manual Therapy  Education         TREATMENT:     EVALUATION: LOW COMPLEXITY: (Untimed Charge)    TREATMENT:   Neuromuscular Re-education (10 Minutes): Neuromuscular Re-education included Balance Training, Coordination training, Functional mobility with facilitation, Sitting balance training, and Standing balance training to improve Balance, Coordination, and Functional Mobility. Self Care (13 minutes): Patient participated in upper body bathing, lower body bathing, toileting, upper body dressing, lower body dressing, self feeding, and grooming ADLs in supported sitting, unsupported sitting, and standing with minimal tactile cueing to increase independence, decrease assistance required, increase activity tolerance, and increase safety awareness. Patient also participated in functional mobility, energy conservation, and adaptive equipment training to increase independence, decrease assistance required, increase activity tolerance, and increase safety awareness.      TREATMENT GRID:  N/A    AFTER TREATMENT PRECAUTIONS: Bed/Chair Locked, Call light within reach, Chair, Needs within reach, RN notified, and Visitors at bedside    INTERDISCIPLINARY COLLABORATION:  RN/ PCT and OT/ SALAZAR    EDUCATION:  Education Given To: Patient  Education Provided: Role of Therapy;Plan of Care;Precautions; ADL Adaptive Strategies; Energy Conservation; Fall Prevention Strategies  Education Outcome: Verbalized understanding    TOTAL TREATMENT DURATION AND TIME:  Time In: 1003  Time Out: [address]  Minutes: [address] [place] OT

## 2022-12-09 LAB
ANION GAP SERPL CALC-SCNC: 8 MMOL/L (ref 2–11)
BASOPHILS # BLD: 0 K/UL (ref 0–0.2)
BASOPHILS NFR BLD: 0 % (ref 0–2)
BUN SERPL-MCNC: 11 MG/DL (ref 8–23)
CALCIUM SERPL-MCNC: 8 MG/DL (ref 8.3–10.4)
CHLORIDE SERPL-SCNC: 109 MMOL/L (ref 101–110)
CO2 SERPL-SCNC: 22 MMOL/L (ref 21–32)
CREAT SERPL-MCNC: 0.7 MG/DL (ref 0.6–1)
DIFFERENTIAL METHOD BLD: ABNORMAL
EOSINOPHIL # BLD: 0 K/UL (ref 0–0.8)
EOSINOPHIL NFR BLD: 0 % (ref 0.5–7.8)
ERYTHROCYTE [DISTWIDTH] IN BLOOD BY AUTOMATED COUNT: 15.6 % (ref 11.9–14.6)
GLUCOSE SERPL-MCNC: 119 MG/DL (ref 65–100)
HCT VFR BLD AUTO: 37.5 % (ref 35.8–46.3)
HGB BLD-MCNC: 11.5 G/DL (ref 11.7–15.4)
IMM GRANULOCYTES # BLD AUTO: 0.2 K/UL (ref 0–0.5)
IMM GRANULOCYTES NFR BLD AUTO: 1 % (ref 0–5)
L PNEUMO1 AG UR QL IA: NEGATIVE
LYMPHOCYTES # BLD: 0.9 K/UL (ref 0.5–4.6)
LYMPHOCYTES NFR BLD: 5 % (ref 13–44)
MAGNESIUM SERPL-MCNC: 1.8 MG/DL (ref 1.8–2.4)
MCH RBC QN AUTO: 28.3 PG (ref 26.1–32.9)
MCHC RBC AUTO-ENTMCNC: 30.7 G/DL (ref 31.4–35)
MCV RBC AUTO: 92.1 FL (ref 82–102)
MM INDURATION, POC: 0 MM (ref 0–5)
MONOCYTES # BLD: 0.7 K/UL (ref 0.1–1.3)
MONOCYTES NFR BLD: 4 % (ref 4–12)
NEUTS SEG # BLD: 16.2 K/UL (ref 1.7–8.2)
NEUTS SEG NFR BLD: 90 % (ref 43–78)
NRBC # BLD: 0 K/UL (ref 0–0.2)
PLATELET # BLD AUTO: 226 K/UL (ref 150–450)
PMV BLD AUTO: 10.7 FL (ref 9.4–12.3)
POTASSIUM SERPL-SCNC: 3.4 MMOL/L (ref 3.5–5.1)
PPD, POC: NEGATIVE
RBC # BLD AUTO: 4.07 M/UL (ref 4.05–5.2)
SODIUM SERPL-SCNC: 139 MMOL/L (ref 133–143)
SPECIMEN SOURCE: NORMAL
WBC # BLD AUTO: 18 K/UL (ref 4.3–11.1)

## 2022-12-09 PROCEDURE — 2580000003 HC RX 258: Performed by: INTERNAL MEDICINE

## 2022-12-09 PROCEDURE — 83735 ASSAY OF MAGNESIUM: CPT

## 2022-12-09 PROCEDURE — 85025 COMPLETE CBC W/AUTO DIFF WBC: CPT

## 2022-12-09 PROCEDURE — 6370000000 HC RX 637 (ALT 250 FOR IP): Performed by: FAMILY MEDICINE

## 2022-12-09 PROCEDURE — 6360000002 HC RX W HCPCS: Performed by: INTERNAL MEDICINE

## 2022-12-09 PROCEDURE — 36415 COLL VENOUS BLD VENIPUNCTURE: CPT

## 2022-12-09 PROCEDURE — 80048 BASIC METABOLIC PNL TOTAL CA: CPT

## 2022-12-09 PROCEDURE — 6370000000 HC RX 637 (ALT 250 FOR IP): Performed by: INTERNAL MEDICINE

## 2022-12-09 PROCEDURE — 1100000000 HC RM PRIVATE

## 2022-12-09 RX ADMIN — TRAZODONE HYDROCHLORIDE 50 MG: 50 TABLET ORAL at 00:01

## 2022-12-09 RX ADMIN — HEPARIN SODIUM 5000 UNITS: 5000 INJECTION INTRAVENOUS; SUBCUTANEOUS at 07:59

## 2022-12-09 RX ADMIN — AZITHROMYCIN MONOHYDRATE 500 MG: 250 TABLET ORAL at 07:59

## 2022-12-09 RX ADMIN — PANTOPRAZOLE SODIUM 40 MG: 40 TABLET, DELAYED RELEASE ORAL at 05:18

## 2022-12-09 RX ADMIN — SODIUM CHLORIDE, POTASSIUM CHLORIDE, SODIUM LACTATE AND CALCIUM CHLORIDE: 600; 310; 30; 20 INJECTION, SOLUTION INTRAVENOUS at 01:34

## 2022-12-09 RX ADMIN — SODIUM CHLORIDE, PRESERVATIVE FREE 10 ML: 5 INJECTION INTRAVENOUS at 20:31

## 2022-12-09 RX ADMIN — ACETAMINOPHEN 650 MG: 325 TABLET, FILM COATED ORAL at 20:27

## 2022-12-09 RX ADMIN — HEPARIN SODIUM 5000 UNITS: 5000 INJECTION INTRAVENOUS; SUBCUTANEOUS at 20:31

## 2022-12-09 RX ADMIN — CEFTRIAXONE 1000 MG: 1 INJECTION, POWDER, FOR SOLUTION INTRAMUSCULAR; INTRAVENOUS at 16:26

## 2022-12-09 ASSESSMENT — PAIN SCALES - WONG BAKER
WONGBAKER_NUMERICALRESPONSE: 0

## 2022-12-09 ASSESSMENT — PAIN - FUNCTIONAL ASSESSMENT: PAIN_FUNCTIONAL_ASSESSMENT: PREVENTS OR INTERFERES SOME ACTIVE ACTIVITIES AND ADLS

## 2022-12-09 ASSESSMENT — PAIN SCALES - GENERAL
PAINLEVEL_OUTOF10: 5
PAINLEVEL_OUTOF10: 0
PAINLEVEL_OUTOF10: 3
PAINLEVEL_OUTOF10: 0
PAINLEVEL_OUTOF10: 0

## 2022-12-09 ASSESSMENT — PAIN DESCRIPTION - LOCATION: LOCATION: CHEST

## 2022-12-09 ASSESSMENT — PAIN DESCRIPTION - DESCRIPTORS: DESCRIPTORS: ACHING

## 2022-12-09 ASSESSMENT — PAIN DESCRIPTION - ORIENTATION: ORIENTATION: RIGHT;ANTERIOR;UPPER

## 2022-12-09 NOTE — PROGRESS NOTES
Hospitalist History and Physical   Admit Date:  2022  3:15 PM   Name:  Maximino Oakes   Age:  80 y.o. Sex:  female  :  1940   MRN:  [id number]   Room:      Presenting Complaint: Chest Pain     Reason(s) for Admission: Peritoneal free air [K66.8]  Severe sepsis ([address] [A41.9, R65.20]  Pneumonia of right upper lobe due to infectious organism [J18.9]  Chest pain, unspecified type [R07.9]  Sepsis, due to unspecified organism, unspecified whether acute organ dysfunction present ([address] [A41.9]     History of Present Illness:   Maximino Oakes is a 80 y.o. female with medical history of Afib s/p PPM (not on AC) presents with sudden onset worsening shortness of breath and fevers that started earlier today. She says \"I have had pneumonia in the past a few times and this feels like it. \" Denies any sick contacts with family or neighbors. Denies chest pain. Says \"I have just been so tired and I could sleep all day. \" Does have some shortness of breath. Denies abdominal pain, nausea/vomiting or diarrhea. Not sure but believes she received two \"pneumonia vaccines in the past\" from her PCP. Only takes OTC medications (vitamin D, biotin). Does not take any prescription medications at all. Of note, incidental finding of possible duodenal perforation seen on CT imaging (was originally ordered to evaluate for PE). Patient does have \"some reflux whenever I eat something acidic\" but denies nausea/vomiting or diarrhea. She has has not had any problems with eating or drinking and denies abdominal pain. ED Course: Sodium of 131. LA of 2.1. WBC count of 24k. Blood cultures collected x2. CT chest negative for PE. Initial Ct chest also suggested duodenal perforation and Flagyl added to antibiotic regimen; however, dedicated CT abd/pelvis ordered to evaluate for perforation and is negative for any intra-abdominal pathology. Given broad spectrum antibiotics and fluids. Hospitalist consulted for admission. 12/9/2022  Patient seen and evaluated. States she is feeling better but still feels very weak and ill  States she cannot feed herself and cannot dress herself  Seen by therapy services and recommending home health therapy  Further clarification from the patient and her  is that she cannot prepare her own Alice Vargas is able to feed herself  Complains of right-sided chest pain with deep breathing-this is the location of her pneumonia  Denies worsening shortness of breath nausea vomiting or chills  Afebrile overnight    Assessment & Plan:     Principal Problem:    Severe sepsis ([facility]  - Due to RLL pneumonia  - follow blood cultures  - stop Flagyl  - CTX empirically   - azithromycin for atypical coverage  - Legionella and pneumococcus urine antigens ordered  - IVF resuscitation    12/9/2022  Continue antibiotics  Follow-up cultures no growth to date    # Acute hypoxic respiratory failure  - CT chest negative for PE  - likely due to above  - management as above  - jet nebs as needed  - no smoking history and no history of pulmonary issues  - wean supplemental oxygen as tolerated  - goal SpO2>90%    12/9/2022  Continue supplemental oxygen  Wean as tolerated    # Hyponatremia  - likely related to above and poor po intake  - fluids as above  - monitor with AM labs    12/9/2022  Resolved    Hypomagnesemia  12/9/2022  Replaced  We will send repeat      Anticipated discharge needs:     PT/OT and PPD ordered. Diet: ADULT DIET; Regular  VTE ppx: heparin   Code status: Full Code    Hospital Problems:  Principal Problem:    Severe sepsis ([facility]  Active Problems:    Acute respiratory failure with hypoxemia (HCC)    Hyponatremia  Resolved Problems:    * No resolved hospital problems.  *       Objective:   Patient Vitals for the past 24 hrs:   Temp Pulse Resp BP SpO2   12/09/22 1528 99.1 °F (37.3 °C) (!) 115 17 125/64 92 %   12/09/22 1115 97.7 °F (36.5 °C) 82 18 122/60 94 %   12/09/22 0738 97.3 °F (36.3 °C) 85 21 (!) 117/45 94 %   12/09/22 0404 97.9 °F (36.6 °C) 81 18 (!) 103/55 92 %   12/08/22 2244 99.5 °F (37.5 °C) 98 16 (!) 116/51 90 %   12/08/22 1916 -- 54 -- -- 95 %   12/08/22 1840 99.1 °F (37.3 °C) 54 18 124/73 91 %         Oxygen Therapy  SpO2: 92 %  Pulse via Oximetry: 108 beats per minute  O2 Device: Nasal cannula  O2 Flow Rate (L/min): 2 L/min  O2 Delivery Method: Nasal cannula    Estimated body mass index is 19.19 kg/m² as calculated from the following:    Height as of this encounter: 4' 11\" (1.499 m). Weight as of this encounter: 95 lb (43.1 kg). Intake/Output Summary (Last 24 hours) at 12/9/2022 1705  Last data filed at 12/9/2022 1528  Gross per 24 hour   Intake 3036.71 ml   Output 575 ml   Net 2461.71 ml           Physical Exam:    Blood pressure 125/64, pulse (!) 115, temperature 99.1 °F (37.3 °C), temperature source Oral, resp. rate 17, height 4' 11\" (1.499 m), weight 95 lb (43.1 kg), SpO2 92 %. General:    Well nourished. Very pleasant  Head:  Normocephalic, atraumatic  Eyes:  Sclerae appear normal.  Pupils equally round. ENT:  Nares appear normal, no drainage. Moist oral mucosa  Neck:  No restricted ROM. Trachea midline   CV:   RRR. No jugular venous distension. Lungs:   CTAB. No wheezing, rhonchi, or rales. Symmetric expansion. Abdomen: Bowel sounds present. Soft, nontender, nondistended. Extremities: No cyanosis or clubbing. No edema  Skin:     No rashes and normal coloration. Warm and dry. Neuro:  CN II-XII grossly intact. Sensation intact. A&Ox3  Psych:  Normal mood and affect.       I have personally reviewed labs and tests showing:  Recent Labs:  Recent Results (from the past 24 hour(s))   PLEASE READ & DOCUMENT PPD TEST IN 24 HRS    Collection Time: 12/08/22 11:04 PM   Result Value Ref Range    PPD, (POC) Negative Negative    mm Induration 0 0 - 5 mm   CBC with Auto Differential    Collection Time: 12/09/22  1:17 PM   Result Value Ref Range    WBC 18.0 (H) 4.3 - 11.1 K/uL RBC 4.07 4.05 - 5.2 M/uL    Hemoglobin 11.5 (L) 11.7 - 15.4 g/dL    Hematocrit 37.5 35.8 - 46.3 %    MCV 92.1 82 - 102 FL    MCH 28.3 26.1 - 32.9 PG    MCHC 30.7 (L) 31.4 - 35.0 g/dL    RDW 15.6 (H) 11.9 - 14.6 %    Platelets 541 640 - 506 K/uL    MPV 10.7 9.4 - 12.3 FL    nRBC 0.00 0.0 - 0.2 K/uL    Differential Type AUTOMATED      Seg Neutrophils 90 (H) 43 - 78 %    Lymphocytes 5 (L) 13 - 44 %    Monocytes 4 4.0 - 12.0 %    Eosinophils % 0 (L) 0.5 - 7.8 %    Basophils 0 0.0 - 2.0 %    Immature Granulocytes 1 0.0 - 5.0 %    Segs Absolute 16.2 (H) 1.7 - 8.2 K/UL    Absolute Lymph # 0.9 0.5 - 4.6 K/UL    Absolute Mono # 0.7 0.1 - 1.3 K/UL    Absolute Eos # 0.0 0.0 - 0.8 K/UL    Basophils Absolute 0.0 0.0 - 0.2 K/UL    Absolute Immature Granulocyte 0.2 0.0 - 0.5 K/UL   Basic Metabolic Panel    Collection Time: 12/09/22  1:17 PM   Result Value Ref Range    Sodium 139 133 - 143 mmol/L    Potassium 3.4 (L) 3.5 - 5.1 mmol/L    Chloride 109 101 - 110 mmol/L    CO2 22 21 - 32 mmol/L    Anion Gap 8 2 - 11 mmol/L    Glucose 119 (H) 65 - 100 mg/dL    BUN 11 8 - 23 MG/DL    Creatinine 0.70 0.6 - 1.0 MG/DL    Est, Glom Filt Rate >60 >60 ml/min/1.73m2    Calcium 8.0 (L) 8.3 - 10.4 MG/DL       I have personally reviewed imaging studies showing:  CT ABDOMEN PELVIS W IV CONTRAST Additional Contrast? Oral    Result Date: 12/7/2022  Exam: CT ABDOMEN PELVIS W IV CONTRAST on 12/7/2022 7:03 PM Clinical History: The Female patient is 80years old  presenting for concern for free air in abdomen Technique: Thin slice axial images were obtained through the abdomen and pelvis with intravenous and with oral contrast.  Coronal reformatted images were also provided for review. A total of 100 ml of Iopamidol (ISOVUE-370) 76 % contrast was administered intravenously.  All CT scans at this facility are performed using dose reduction/dose modulation techniques, as appropriate the performed exam, including the following: Automated Exposure Control; Adjustment of the mA and/or kV according to patient size (this includes techniques or standardized protocols for targeted exams where dose is matched to indication/reason for exam); and Use of Iterative Reconstruction Technique. Radiation Exposure Indices: Reference Air Kerma ([place]) = 466 mGy-cm COMPARISON:  Chest CT 12/7/2022. FINDINGS:  Abdomen: Liver: Normal size, contour, density and enhancement without focal mass. Biliary: Unremarkable gallbladder. No evidence of intra or extrahepatic biliary dilatation. Pancreas: Normal in size and contour without focal lesion. Spleen: Normal in size and contour without focal lesion. Adrenal glands: Normal in size without focal lesion. Kidneys: Symmetric without evidence of hydronephrosis or nephrolithiasis. Normal enhancement throughout all visualized phases of contrast excretion. 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 Retroperitoneum/vasculature: No evidence of significant adenopathy. Mild aortoiliac atherosclerotic disease without evidence of aneurysmal dilatation. Abdominal soft tissues: Unremarkable. Osseous structures: Age-indeterminate moderate L2 compression fracture deformity. Pelvis: Unremarkable bladder. No significant adenopathy or free fluid. Lambert Patel Hysterectomy. 1. No acute intra-abdominal or pelvic abnormality. 2. 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 3. Age-indeterminate moderate L2 compression fracture deformity. CPT code(s): [id number], [id number]    CT CHEST PULMONARY EMBOLISM W CONTRAST    Result Date: 12/7/2022  Exam: CT CHEST PULMONARY EMBOLISM W CONTRAST on 12/7/2022 5:28 PM Clinical History:  The Female patient is 80years old  presenting for right sided chest pain, tachycardia. Technique: Thin section axial images were obtained through the chest with intravenous contrast.  Coronal reformatted images were also provided for review. A total of 100 ml of Iopamidol (ISOVUE-370) 76 % contrast was administered intravenously. All CT scans at this facility are performed using dose reduction/dose modulation techniques, as appropriate the performed exam, including the following: Automated Exposure Control; Adjustment of the mA and/or kV according to patient size (this includes techniques or standardized protocols for targeted exams where dose is matched to indication/reason for exam); and Use of Iterative Reconstruction Technique. Radiation Exposure Indices: Reference Air Kerma (Ka,r) = 176 mGy-cm Comparison:  None. FINDINGS: Images through the lungs demonstrate consolidative changes in the posterior right upper lobe. There are bronchiectatic changes at the lung bases. Normal vascular enhancement is demonstrated. No evidence of aortic dissection or aneurysmal dilatation is seen. There is no evidence of pulmonary embolic disease. There is no significant hilar or mediastinal lymphadenopathy. Images chest wall structures are unremarkable in appearance. Images through the upper abdomen demonstrate a small focus of air in the berenice hepatis concerning for possible duodenal ulcer perforation. There is a large partially imaged cyst in the left mid kidney measuring at least 6 cm. There are no acute osseous abnormalities. 1. No evidence of pulmonary embolic disease. 2. Posterior right upper lobe pneumonia. 3. Bibasilar bronchiectatic changes. 4. Small focus of free air is suggested in the berenice hepatis concerning for duodenal ulcer perforation. Clinical correlation is recommended with additional imaging as indicated.  The findings contained in this report were communicated to the emergency room physician  by  Dr Rodger Haywood on 12/7/2022 5:39 PM. CPT code(s) 17692       Echocardiogram:  No results found for this or any previous visit.         Orders Placed This Encounter   Medications    0.9 % sodium chloride bolus    sodium chloride flush 0.9 % injection 10 mL    iopamidol (ISOVUE-370) 76 % injection 100 mL    cefTRIAXone (ROCEPHIN) 1,000 mg in sodium chloride 0.9 % 50 mL IVPB mini-bag     Order Specific Question:   Antimicrobial Indications     Answer:   Sepsis of Unknown Etiology    azithromycin (ZITHROMAX) 500 mg in sodium chloride 0.9 % 250 mL IVPB ([id number])     Order Specific Question:   Antimicrobial Indications     Answer:   Sepsis of Unknown Etiology    acetaminophen (TYLENOL) tablet 650 mg    diatrizoate meglumine-sodium (GASTROGRAFIN) 66-10 % solution 15 mL    metronidazole (FLAGYL) 500 mg in 0.9% NaCl 100 mL IVPB premix     Order Specific Question:   Antimicrobial Indications     Answer:   Intra-Abdominal Infection    sodium chloride flush 0.9 % injection 10 mL    0.9 % sodium chloride bolus    iopamidol (ISOVUE-370) 76 % injection 80 mL    0.9 % sodium chloride bolus    0.9 % sodium chloride bolus    sodium chloride flush 0.9 % injection 5-40 mL    sodium chloride flush 0.9 % injection 5-40 mL    0.9 % sodium chloride infusion    heparin (porcine) injection 5,000 Units    OR Linked Order Group     ondansetron (ZOFRAN-ODT) disintegrating tablet 4 mg     ondansetron (ZOFRAN) injection 4 mg    polyethylene glycol (GLYCOLAX) packet 17 g    OR Linked Order Group     acetaminophen (TYLENOL) tablet 650 mg     acetaminophen (TYLENOL) suppository 650 mg    cefTRIAXone (ROCEPHIN) 1,000 mg in sodium chloride 0.9 % 50 mL IVPB mini-bag     Order Specific Question:   Antimicrobial Indications     Answer:   Pneumonia (CAP)     Order Specific Question:   CAP duration of therapy     Answer:   5 days    azithromycin (ZITHROMAX) tablet 500 mg     Order Specific Question:   Antimicrobial Indications     Answer:   Pneumonia (CAP)     Order Specific Question:   CAP duration of therapy     Answer:   5 days    albuterol (PROVENTIL) nebulizer solution 2.5 mg     Order Specific Question:   Initiate RT Bronchodilator Protocol     Answer:   Yes - Inpatient Protocol    tuberculin injection 5 Units    lactated ringers infusion    pantoprazole (PROTONIX) tablet 40 mg    calcium carbonate (TUMS) chewable tablet 500 mg    traZODone (DESYREL) tablet 50 mg    oxyCODONE (ROXICODONE) immediate release tablet 5 mg    hyoscyamine (LEVSIN/SL) sublingual tablet 125 mcg         Signed:  Isac Khan MD    Part of this note may have been written by using a voice dictation software. The note has been proof read but may still contain some grammatical/other typographical errors.

## 2022-12-09 NOTE — PROGRESS NOTES
Patient has tylenol for pain, was asking if she can get any thing stonger than that and requesting something to help her to sleep. Hospitalist was notified and orders were placed.

## 2022-12-09 NOTE — CARE COORDINATION
Chart reviewed by [facility] and discussed in IDR. Patient admitted with Severe sepsis and RUL pneumonia. Patient currently on 2L NC. Patient will need walk test at discharge to determine O2 needs at home. Patient on IV antibiotics. Therapy recommending [place] when stable at discharge, [facility] referral made per patient request. CM following.

## 2022-12-09 NOTE — PROGRESS NOTES
Late entry. Patient's encounter with admitted was actually in 2022  So I am able to bill for this encounter. Hospitalist History and Physical   Admit Date:  2022  3:15 PM   Name:  Andrea Goetz   Age:  80 y.o. Sex:  female  :  1940   MRN:  [id number]   Room:  [facility]    Presenting Complaint: Chest Pain     Reason(s) for Admission: Peritoneal free air [K66.8]  Severe sepsis ([facility] [A41.9, R65.20]  Pneumonia of right upper lobe due to infectious organism [J18.9]  Chest pain, unspecified type [R07.9]  Sepsis, due to unspecified organism, unspecified whether acute organ dysfunction present [facility] [A41.9]     History of Present Illness:   As per prior documentation:  \"  Katy Renee is a 80 y.o. female with medical history of Afib s/p PPM (not on AC) presents with sudden onset worsening shortness of breath and fevers that started earlier today. She says \"I have had pneumonia in the past a few times and this feels like it. \" Denies any sick contacts with family or neighbors. Denies chest pain. Says \"I have just been so tired and I could sleep all day. \" Does have some shortness of breath. Denies abdominal pain, nausea/vomiting or diarrhea. Not sure but believes she received two \"pneumonia vaccines in the past\" from her PCP. Only takes OTC medications (vitamin D, biotin). Does not take any prescription medications at all. Of note, incidental finding of possible duodenal perforation seen on CT imaging (was originally ordered to evaluate for PE). Patient does have \"some reflux whenever I eat something acidic\" but denies nausea/vomiting or diarrhea. She has has not had any problems with eating or drinking and denies abdominal pain. ED Course: Sodium of 131. LA of 2.1. WBC count of 24k. Blood cultures collected x2. CT chest negative for PE.  Initial Ct chest also suggested duodenal perforation and Flagyl added to antibiotic regimen; however, dedicated CT abd/pelvis ordered to evaluate for perforation and is negative for any intra-abdominal pathology. Given broad spectrum antibiotics and fluids. Hospitalist consulted for admission. \"      12/8/2022  Subjective  Patient seen and evaluated. Complains of being ill  Complains of being very weak  Feels slightly improved since being admitted  Denies chest pain, worsening shortness of breath nausea vomiting or chills  Low-grade fever    Assessment & Plan:     Principal Problem:    [facility]  - Due to RLL pneumonia  - follow blood cultures  - stop Flagyl  - CTX empirically   - azithromycin for atypical coverage  - Legionella and pneumococcus urine antigens ordered  - IVF resuscitation    12/8/2022  Continue antibiotics  Cultures no growth to date    # Acute hypoxic respiratory failure  - CT chest negative for PE  - likely due to above  - management as above  - jet nebs as needed  - no smoking history and no history of pulmonary issues  - wean supplemental oxygen as tolerated  - goal SpO2>90%    12/8/2022  Continue supplemental oxygen  Wean as tolerated    # Hyponatremia  - likely related to above and poor po intake  - fluids as above  - monitor with AM labs    12/8/2022  Resolved    Hypomagnesemia  12/8/2022  Replace    Hypokalemia  12/8/2022  Replace      Anticipated discharge needs:     PT/OT ordered and PPD already placed    Diet: ADULT DIET; Regular  VTE ppx: heparin   Code status: Full Code    Hospital Problems:  Principal Problem:    Severe sepsis ([facility]  Active Problems:    Acute respiratory failure with hypoxemia (HCC)    Hyponatremia  Resolved Problems:    * No resolved hospital problems.  *     Objective:   Patient Vitals for the past 24 hrs:   Temp Pulse Resp BP SpO2                                   12/08/22 2244 99.5 °F (37.5 °C) 98 16 (!) 116/51 90 %   12/08/22 1916 -- 54 -- -- 95 %   12/08/22 1840 99.1 °F (37.3 °C) 54 18 124/73 91 %             Estimated body mass index is 19.19 kg/m² as calculated from the following:    Height as of this encounter: 4' 11\" (1.499 m). Weight as of this encounter: 95 lb (43.1 kg). Physical Exam:    Blood pressure 124/73, pulse 54 temperature 99.1 °F (37.3 °C), temperature source Oral, resp. rate 18, height 4' 11\" (1.499 m), weight 95 lb (43.1 kg), SpO2 91 %. General:    Well nourished. Very pleasant  Head:  Normocephalic, atraumatic  Eyes:  Sclerae appear normal.  Pupils equally round. ENT:  Nares appear normal, no drainage. Moist oral mucosa  Neck:  No restricted ROM. Trachea midline   CV:   RRR. No jugular venous distension. Lungs:   CTAB. No wheezing, rhonchi, or rales. Symmetric expansion. Abdomen: Bowel sounds present. Soft, nontender, nondistended. Extremities: No cyanosis or clubbing. No edema  Skin:     No rashes and normal coloration. Warm and dry. Neuro:  CN II-XII grossly intact. Sensation intact. A&Ox3  Psych:  Normal mood and affect.       I have personally reviewed labs and tests showing:  Recent Labs:  Recent Results (from the past 24 hour(s))   PLEASE READ & DOCUMENT PPD TEST IN 24 HRS    Collection Time: 12/08/22 11:04 PM   Result Value Ref Range    PPD, (POC) Negative Negative    mm Induration 0 0 - 5 mm   CBC with Auto Differential    Collection Time: 12/09/22  1:17 PM   Result Value Ref Range    WBC 18.0 (H) 4.3 - 11.1 K/uL    RBC 4.07 4.05 - 5.2 M/uL    Hemoglobin 11.5 (L) 11.7 - 15.4 g/dL    Hematocrit 37.5 35.8 - 46.3 %    MCV 92.1 82 - 102 FL    MCH 28.3 26.1 - 32.9 PG    MCHC 30.7 (L) 31.4 - 35.0 g/dL    RDW 15.6 (H) 11.9 - 14.6 %    Platelets 758 077 - 800 K/uL    MPV 10.7 9.4 - 12.3 FL    nRBC 0.00 0.0 - 0.2 K/uL    Differential Type AUTOMATED      Seg Neutrophils 90 (H) 43 - 78 %    Lymphocytes 5 (L) 13 - 44 %    Monocytes 4 4.0 - 12.0 %    Eosinophils % 0 (L) 0.5 - 7.8 %    Basophils 0 0.0 - 2.0 %    Immature Granulocytes 1 0.0 - 5.0 %    Segs Absolute 16.2 (H) 1.7 - 8.2 K/UL    Absolute Lymph # 0.9 0.5 - 4.6 K/UL    Absolute Mono # 0.7 0.1 - 1.3 K/UL Absolute Eos # 0.0 0.0 - 0.8 K/UL    Basophils Absolute 0.0 0.0 - 0.2 K/UL    Absolute Immature Granulocyte 0.2 0.0 - 0.5 K/UL   Basic Metabolic Panel    Collection Time: 12/09/22  1:17 PM   Result Value Ref Range    Sodium 139 133 - 143 mmol/L    Potassium 3.4 (L) 3.5 - 5.1 mmol/L    Chloride 109 101 - 110 mmol/L    CO2 22 21 - 32 mmol/L    Anion Gap 8 2 - 11 mmol/L    Glucose 119 (H) 65 - 100 mg/dL    BUN 11 8 - 23 MG/DL    Creatinine 0.70 0.6 - 1.0 MG/DL    Est, Glom Filt Rate >60 >60 ml/min/1.73m2    Calcium 8.0 (L) 8.3 - 10.4 MG/DL       I have personally reviewed imaging studies showing:  CT ABDOMEN PELVIS W IV CONTRAST Additional Contrast? Oral    Result Date: 12/7/2022  Exam: CT ABDOMEN PELVIS W IV CONTRAST on 12/7/2022 7:03 PM Clinical History: The Female patient is 80years old  presenting for concern for free air in abdomen Technique: Thin slice axial images were obtained through the abdomen and pelvis with intravenous and with oral contrast.  Coronal reformatted images were also provided for review. A total of 100 ml of Iopamidol (ISOVUE-370) 76 % contrast was administered intravenously. All CT scans at this facility are performed using dose reduction/dose modulation techniques, as appropriate the performed exam, including the following: Automated Exposure Control; Adjustment of the mA and/or kV according to patient size (this includes techniques or standardized protocols for targeted exams where dose is matched to indication/reason for exam); and Use of Iterative Reconstruction Technique. Radiation Exposure Indices: Reference Air Kerma (Joel Peal) = 466 mGy-cm COMPARISON:  Chest CT 12/7/2022. FINDINGS:  Abdomen: Liver: Normal size, contour, density and enhancement without focal mass. Biliary: Unremarkable gallbladder. No evidence of intra or extrahepatic biliary dilatation. Pancreas: Normal in size and contour without focal lesion. Spleen: Normal in size and contour without focal lesion.  Adrenal glands: Normal in size without focal lesion. Kidneys: Symmetric without evidence of hydronephrosis or nephrolithiasis. Normal enhancement throughout all visualized phases of contrast excretion. 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 Retroperitoneum/vasculature: No evidence of significant adenopathy. Mild aortoiliac atherosclerotic disease without evidence of aneurysmal dilatation. Abdominal soft tissues: Unremarkable. Osseous structures: Age-indeterminate moderate L2 compression fracture deformity. Pelvis: Unremarkable bladder. No significant adenopathy or free fluid. Farnaz Aj Hysterectomy. 1. No acute intra-abdominal or pelvic abnormality. 2. 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 3. Age-indeterminate moderate L2 compression fracture deformity. CPT code(s): C812717, [id number]    CT CHEST PULMONARY EMBOLISM W CONTRAST    Result Date: 12/7/2022  Exam: CT CHEST PULMONARY EMBOLISM W CONTRAST on 12/7/2022 5:28 PM Clinical History: The Female patient is 80years old  presenting for right sided chest pain, tachycardia. Technique: Thin section axial images were obtained through the chest with intravenous contrast.  Coronal reformatted images were also provided for review. A total of 100 ml of Iopamidol (ISOVUE-370) 76 % contrast was administered intravenously. All CT scans at this facility are performed using dose reduction/dose modulation techniques, as appropriate the performed exam, including the following: Automated Exposure Control;  Adjustment of the mA and/or kV according to patient size (this includes techniques or standardized protocols for targeted exams where dose is matched to indication/reason for exam); and Use of Iterative Reconstruction Technique. Radiation Exposure Indices: Reference Air Kerma (Ka,r) = 176 mGy-cm Comparison:  None. FINDINGS: Images through the lungs demonstrate consolidative changes in the posterior right upper lobe. There are bronchiectatic changes at the lung bases. Normal vascular enhancement is demonstrated. No evidence of aortic dissection or aneurysmal dilatation is seen. There is no evidence of pulmonary embolic disease. There is no significant hilar or mediastinal lymphadenopathy. Images chest wall structures are unremarkable in appearance. Images through the upper abdomen demonstrate a small focus of air in the berenice hepatis concerning for possible duodenal ulcer perforation. There is a large partially imaged cyst in the left mid kidney measuring at least 6 cm. There are no acute osseous abnormalities. 1. No evidence of pulmonary embolic disease. 2. Posterior right upper lobe pneumonia. 3. Bibasilar bronchiectatic changes. 4. Small focus of free air is suggested in the berenice hepatis concerning for duodenal ulcer perforation. Clinical correlation is recommended with additional imaging as indicated. The findings contained in this report were communicated to the emergency room physician  by  Dr Olvin Barkley on 12/7/2022 5:39 PM. CPT code(s) 50774       Echocardiogram:  No results found for this or any previous visit.         Orders Placed This Encounter   Medications    0.9 % sodium chloride bolus    sodium chloride flush 0.9 % injection 10 mL    iopamidol (ISOVUE-370) 76 % injection 100 mL    cefTRIAXone (ROCEPHIN) 1,000 mg in sodium chloride 0.9 % 50 mL IVPB mini-bag     Order Specific Question:   Antimicrobial Indications     Answer:   Sepsis of Unknown Etiology    azithromycin (ZITHROMAX) 500 mg in sodium chloride 0.9 % 250 mL IVPB ([id number])     Order Specific Question:   Antimicrobial Indications     Answer:   Sepsis of Unknown Etiology    acetaminophen (TYLENOL) tablet 650 mg    diatrizoate meglumine-sodium (GASTROGRAFIN) 66-10 % solution 15 mL    metronidazole (FLAGYL) 500 mg in 0.9% NaCl 100 mL IVPB premix     Order Specific Question:   Antimicrobial Indications     Answer:   Intra-Abdominal Infection    sodium chloride flush 0.9 % injection 10 mL    0.9 % sodium chloride bolus    iopamidol (ISOVUE-370) 76 % injection 80 mL    0.9 % sodium chloride bolus    0.9 % sodium chloride bolus    sodium chloride flush 0.9 % injection 5-40 mL    sodium chloride flush 0.9 % injection 5-40 mL    0.9 % sodium chloride infusion    heparin (porcine) injection 5,000 Units    OR Linked Order Group     ondansetron (ZOFRAN-ODT) disintegrating tablet 4 mg     ondansetron (ZOFRAN) injection 4 mg    polyethylene glycol (GLYCOLAX) packet 17 g    OR Linked Order Group     acetaminophen (TYLENOL) tablet 650 mg     acetaminophen (TYLENOL) suppository 650 mg    cefTRIAXone (ROCEPHIN) 1,000 mg in sodium chloride 0.9 % 50 mL IVPB mini-bag     Order Specific Question:   Antimicrobial Indications     Answer:   Pneumonia (CAP)     Order Specific Question:   CAP duration of therapy     Answer:   5 days    azithromycin (ZITHROMAX) tablet 500 mg     Order Specific Question:   Antimicrobial Indications     Answer:   Pneumonia (CAP)     Order Specific Question:   CAP duration of therapy     Answer:   5 days    albuterol (PROVENTIL) nebulizer solution 2.5 mg     Order Specific Question:   Initiate RT Bronchodilator Protocol     Answer:   Yes - Inpatient Protocol    tuberculin injection 5 Units    lactated ringers infusion    pantoprazole (PROTONIX) tablet 40 mg    calcium carbonate (TUMS) chewable tablet 500 mg    traZODone (DESYREL) tablet 50 mg    oxyCODONE (ROXICODONE) immediate release tablet 5 mg    hyoscyamine (LEVSIN/SL) sublingual tablet 125 mcg         Signed:  Enrique Robert MD    Part of this note may have been written by using a voice dictation software.   The note has been proof read but may still contain some grammatical/other typographical errors.

## 2022-12-09 NOTE — PROGRESS NOTES
END OF SHIFT NOTE:    INTAKE/OUTPUT  12/08 0701 - 12/09 0700  In: 2826.7 [P.O.:370; I.V.:2456.7]  Out: 900 [Urine:900]  Voiding: Yes  Catheter: No          Flatus: Patient does have flatus present. Stool:  1 occurrences. Last BM (including prior to admit): 12/8/2022       Emesis: 0 occurrences. VITAL SIGNS  Patient Vitals for the past 12 hrs:   Temp Pulse Resp BP SpO2   12/09/22 0404 97.9 °F (36.6 °C) 81 18 (!) 103/55 92 %   12/08/22 2244 99.5 °F (37.5 °C) 98 16 (!) 116/51 90 %   12/08/22 1916 -- 54 -- -- 95 %   12/08/22 1840 99.1 °F (37.3 °C) 54 18 124/73 91 %       Ambulating  Yes    Shift report given to oncoming nurse at the bedside.     Michele Calzada RN

## 2022-12-10 ENCOUNTER — HOME HEALTH ADMISSION (OUTPATIENT)
Dept: HOME HEALTH SERVICES | Facility: HOME HEALTH | Age: 82
End: 2022-12-10

## 2022-12-10 LAB
ANION GAP SERPL CALC-SCNC: 8 MMOL/L (ref 2–11)
BASOPHILS # BLD: 0 K/UL (ref 0–0.2)
BASOPHILS NFR BLD: 0 % (ref 0–2)
BUN SERPL-MCNC: 9 MG/DL (ref 8–23)
CALCIUM SERPL-MCNC: 7.8 MG/DL (ref 8.3–10.4)
CHLORIDE SERPL-SCNC: 110 MMOL/L (ref 101–110)
CO2 SERPL-SCNC: 23 MMOL/L (ref 21–32)
CREAT SERPL-MCNC: 0.5 MG/DL (ref 0.6–1)
DIFFERENTIAL METHOD BLD: ABNORMAL
EOSINOPHIL # BLD: 0.1 K/UL (ref 0–0.8)
EOSINOPHIL NFR BLD: 1 % (ref 0.5–7.8)
ERYTHROCYTE [DISTWIDTH] IN BLOOD BY AUTOMATED COUNT: 15.7 % (ref 11.9–14.6)
GLUCOSE SERPL-MCNC: 114 MG/DL (ref 65–100)
HCT VFR BLD AUTO: 32.3 % (ref 35.8–46.3)
HGB BLD-MCNC: 10.2 G/DL (ref 11.7–15.4)
IMM GRANULOCYTES # BLD AUTO: 0.1 K/UL (ref 0–0.5)
IMM GRANULOCYTES NFR BLD AUTO: 1 % (ref 0–5)
LYMPHOCYTES # BLD: 1.3 K/UL (ref 0.5–4.6)
LYMPHOCYTES NFR BLD: 13 % (ref 13–44)
MCH RBC QN AUTO: 28.9 PG (ref 26.1–32.9)
MCHC RBC AUTO-ENTMCNC: 31.6 G/DL (ref 31.4–35)
MCV RBC AUTO: 91.5 FL (ref 82–102)
MM INDURATION, POC: 0 MM (ref 0–5)
MM INDURATION, POC: 1700 MM (ref 0–5)
MONOCYTES # BLD: 0.8 K/UL (ref 0.1–1.3)
MONOCYTES NFR BLD: 8 % (ref 4–12)
NEUTS SEG # BLD: 8.1 K/UL (ref 1.7–8.2)
NEUTS SEG NFR BLD: 77 % (ref 43–78)
NRBC # BLD: 0 K/UL (ref 0–0.2)
PLATELET # BLD AUTO: 225 K/UL (ref 150–450)
PMV BLD AUTO: 10.7 FL (ref 9.4–12.3)
POTASSIUM SERPL-SCNC: 2.8 MMOL/L (ref 3.5–5.1)
PPD, POC: NEGATIVE
PPD, POC: NEGATIVE
RBC # BLD AUTO: 3.53 M/UL (ref 4.05–5.2)
SODIUM SERPL-SCNC: 141 MMOL/L (ref 133–143)
WBC # BLD AUTO: 10.3 K/UL (ref 4.3–11.1)

## 2022-12-10 PROCEDURE — 6370000000 HC RX 637 (ALT 250 FOR IP): Performed by: INTERNAL MEDICINE

## 2022-12-10 PROCEDURE — 94760 N-INVAS EAR/PLS OXIMETRY 1: CPT

## 2022-12-10 PROCEDURE — 2580000003 HC RX 258: Performed by: INTERNAL MEDICINE

## 2022-12-10 PROCEDURE — 36415 COLL VENOUS BLD VENIPUNCTURE: CPT

## 2022-12-10 PROCEDURE — 6360000002 HC RX W HCPCS: Performed by: INTERNAL MEDICINE

## 2022-12-10 PROCEDURE — 1100000000 HC RM PRIVATE

## 2022-12-10 PROCEDURE — 6370000000 HC RX 637 (ALT 250 FOR IP): Performed by: HOSPITALIST

## 2022-12-10 PROCEDURE — 2700000000 HC OXYGEN THERAPY PER DAY

## 2022-12-10 PROCEDURE — 80048 BASIC METABOLIC PNL TOTAL CA: CPT

## 2022-12-10 PROCEDURE — 85025 COMPLETE CBC W/AUTO DIFF WBC: CPT

## 2022-12-10 RX ORDER — POTASSIUM CHLORIDE 20 MEQ/1
40 TABLET, EXTENDED RELEASE ORAL 2 TIMES DAILY WITH MEALS
Status: COMPLETED | OUTPATIENT
Start: 2022-12-10 | End: 2022-12-11

## 2022-12-10 RX ORDER — LANOLIN ALCOHOL/MO/W.PET/CERES
5 CREAM (GRAM) TOPICAL NIGHTLY PRN
Status: DISCONTINUED | OUTPATIENT
Start: 2022-12-10 | End: 2022-12-14 | Stop reason: HOSPADM

## 2022-12-10 RX ADMIN — SODIUM CHLORIDE, POTASSIUM CHLORIDE, SODIUM LACTATE AND CALCIUM CHLORIDE: 600; 310; 30; 20 INJECTION, SOLUTION INTRAVENOUS at 08:52

## 2022-12-10 RX ADMIN — HEPARIN SODIUM 5000 UNITS: 5000 INJECTION INTRAVENOUS; SUBCUTANEOUS at 08:47

## 2022-12-10 RX ADMIN — SODIUM CHLORIDE, PRESERVATIVE FREE 10 ML: 5 INJECTION INTRAVENOUS at 08:48

## 2022-12-10 RX ADMIN — POTASSIUM CHLORIDE 40 MEQ: 1500 TABLET, EXTENDED RELEASE ORAL at 08:48

## 2022-12-10 RX ADMIN — POTASSIUM CHLORIDE 40 MEQ: 1500 TABLET, EXTENDED RELEASE ORAL at 16:40

## 2022-12-10 RX ADMIN — ACETAMINOPHEN 650 MG: 325 TABLET, FILM COATED ORAL at 20:23

## 2022-12-10 RX ADMIN — ACETAMINOPHEN 650 MG: 325 TABLET, FILM COATED ORAL at 02:47

## 2022-12-10 RX ADMIN — Medication 4.5 MG: at 21:51

## 2022-12-10 RX ADMIN — AZITHROMYCIN MONOHYDRATE 500 MG: 250 TABLET ORAL at 08:47

## 2022-12-10 RX ADMIN — SODIUM CHLORIDE, POTASSIUM CHLORIDE, SODIUM LACTATE AND CALCIUM CHLORIDE: 600; 310; 30; 20 INJECTION, SOLUTION INTRAVENOUS at 20:15

## 2022-12-10 RX ADMIN — CEFTRIAXONE 1000 MG: 1 INJECTION, POWDER, FOR SOLUTION INTRAMUSCULAR; INTRAVENOUS at 16:40

## 2022-12-10 RX ADMIN — HEPARIN SODIUM 5000 UNITS: 5000 INJECTION INTRAVENOUS; SUBCUTANEOUS at 20:11

## 2022-12-10 RX ADMIN — PANTOPRAZOLE SODIUM 40 MG: 40 TABLET, DELAYED RELEASE ORAL at 05:14

## 2022-12-10 ASSESSMENT — PAIN SCALES - GENERAL
PAINLEVEL_OUTOF10: 0
PAINLEVEL_OUTOF10: 3

## 2022-12-10 ASSESSMENT — PAIN DESCRIPTION - LOCATION: LOCATION: CHEST

## 2022-12-10 ASSESSMENT — PAIN - FUNCTIONAL ASSESSMENT: PAIN_FUNCTIONAL_ASSESSMENT: PREVENTS OR INTERFERES SOME ACTIVE ACTIVITIES AND ADLS

## 2022-12-10 ASSESSMENT — PAIN SCALES - WONG BAKER: WONGBAKER_NUMERICALRESPONSE: 0

## 2022-12-10 ASSESSMENT — PAIN DESCRIPTION - ORIENTATION: ORIENTATION: RIGHT;ANTERIOR

## 2022-12-10 ASSESSMENT — PAIN DESCRIPTION - DESCRIPTORS: DESCRIPTORS: ACHING

## 2022-12-10 NOTE — PROGRESS NOTES
Hospitalist History and Physical   Admit Date:  2022  3:15 PM   Name:  Jesusita Soriano   Age:  80 y.o. Sex:  female  :  1940   MRN:  [id number]   Room:      Presenting Complaint: Chest Pain     Reason(s) for Admission: Peritoneal free air [K66.8]  Severe sepsis ([address] [A41.9, R65.20]  Pneumonia of right upper lobe due to infectious organism [J18.9]  Chest pain, unspecified type [R07.9]  Sepsis, due to unspecified organism, unspecified whether acute organ dysfunction present ([address] [A41.9]     History of Present Illness:   Jesusita Soriano is a 80 y.o. female with medical history of Afib s/p PPM (not on AC) presents with sudden onset worsening shortness of breath and fevers that started earlier today. She says \"I have had pneumonia in the past a few times and this feels like it. \" Denies any sick contacts with family or neighbors. Denies chest pain. Says \"I have just been so tired and I could sleep all day. \" Does have some shortness of breath. Denies abdominal pain, nausea/vomiting or diarrhea. Not sure but believes she received two \"pneumonia vaccines in the past\" from her PCP. Only takes OTC medications (vitamin D, biotin). Does not take any prescription medications at all. Of note, incidental finding of possible duodenal perforation seen on CT imaging (was originally ordered to evaluate for PE). Patient does have \"some reflux whenever I eat something acidic\" but denies nausea/vomiting or diarrhea. She has has not had any problems with eating or drinking and denies abdominal pain. ED Course: Sodium of 131. LA of 2.1. WBC count of 24k. Blood cultures collected x2. CT chest negative for PE. Initial Ct chest also suggested duodenal perforation and Flagyl added to antibiotic regimen; however, dedicated CT abd/pelvis ordered to evaluate for perforation and is negative for any intra-abdominal pathology. Given broad spectrum antibiotics and fluids. Hospitalist consulted for admission. 12/10/2022  Patient seen and evaluated. States she is feeling better but still feels very weak and ill  She is sitting out of bed to chair  Denies worsening shortness of breath nausea vomiting or chills  Afebrile overnight    Assessment & Plan:     Principal Problem:    Severe sepsis ([facility]  - Due to RLL pneumonia  - follow blood cultures  - stop Flagyl  - CTX empirically   - azithromycin for atypical coverage  - Legionella and pneumococcus urine antigens ordered  - IVF resuscitation    12/10/2022  Continue antibiotics  Follow-up cultures no growth to date    # Acute hypoxic respiratory failure  - CT chest negative for PE  - likely due to above  - management as above  - jet nebs as needed  - no smoking history and no history of pulmonary issues  - wean supplemental oxygen as tolerated  - goal SpO2>90%    12/10/2022  Continue supplemental oxygen  Wean as tolerated    # Hyponatremia  - likely related to above and poor po intake  - fluids as above  - monitor with AM labs    12/10/2022  Resolved    Hypomagnesemia  12/10/2022  Resolved    Hypokalemia  Replace      Anticipated discharge needs:     PT/OT and PPD ordered. Diet: ADULT DIET; Regular  VTE ppx: heparin   Code status: Full Code    Hospital Problems:  Principal Problem:    Severe sepsis ([facility]  Active Problems:    Acute respiratory failure with hypoxemia (HCC)    Hyponatremia  Resolved Problems:    * No resolved hospital problems.  *       Objective:   Patient Vitals for the past 24 hrs:   Temp Pulse Resp BP SpO2   12/10/22 0713 97.4 °F (36.3 °C) (!) 123 18 104/70 96 %   12/10/22 0345 98 °F (36.7 °C) (!) 111 18 90/61 94 %   12/09/22 2330 -- 74 -- -- --   12/09/22 2329 -- 86 -- -- --   12/09/22 2323 98.6 °F (37 °C) (!) 137 18 (!) 109/55 94 %   12/09/22 1939 99.9 °F (37.7 °C) (!) 116 18 (!) 135/57 92 %   12/09/22 1910 -- (!) 116 -- -- 92 %   12/09/22 1528 99.1 °F (37.3 °C) (!) 115 17 125/64 92 %   12/09/22 1115 97.7 °F (36.5 °C) 82 18 122/60 94 % Oxygen Therapy  SpO2: 96 %  Pulse via Oximetry: 108 beats per minute  O2 Device: Nasal cannula  O2 Flow Rate (L/min): 2 L/min  O2 Delivery Method: Nasal cannula    Estimated body mass index is 19.19 kg/m² as calculated from the following:    Height as of this encounter: 4' 11\" (1.499 m). Weight as of this encounter: 95 lb (43.1 kg). Intake/Output Summary (Last 24 hours) at 12/10/2022 0829  Last data filed at 12/10/2022 0992  Gross per 24 hour   Intake 3012.09 ml   Output 850 ml   Net 2162.09 ml           Physical Exam:    Blood pressure 104/70, pulse (!) 123, temperature 97.4 °F (36.3 °C), temperature source Oral, resp. rate 18, height 4' 11\" (1.499 m), weight 95 lb (43.1 kg), SpO2 96 %. General:    Well nourished. Very pleasant  Head:  Normocephalic, atraumatic  Eyes:  Sclerae appear normal.  Pupils equally round. ENT:  Nares appear normal, no drainage. Moist oral mucosa  Neck:  No restricted ROM. Trachea midline   CV:   RRR. No jugular venous distension. Lungs:   CTAB. No wheezing, rhonchi, or rales. Symmetric expansion. Abdomen: Bowel sounds present. Soft, nontender, nondistended. Extremities: No cyanosis or clubbing. No edema  Skin:     No rashes and normal coloration. Warm and dry. Neuro:  CN II-XII grossly intact. Sensation intact. A&Ox3  Psych:  Normal mood and affect.       I have personally reviewed labs and tests showing:  Recent Labs:  Recent Results (from the past 24 hour(s))   CBC with Auto Differential    Collection Time: 12/09/22  1:17 PM   Result Value Ref Range    WBC 18.0 (H) 4.3 - 11.1 K/uL    RBC 4.07 4.05 - 5.2 M/uL    Hemoglobin 11.5 (L) 11.7 - 15.4 g/dL    Hematocrit 37.5 35.8 - 46.3 %    MCV 92.1 82 - 102 FL    MCH 28.3 26.1 - 32.9 PG    MCHC 30.7 (L) 31.4 - 35.0 g/dL    RDW 15.6 (H) 11.9 - 14.6 %    Platelets 359 743 - 345 K/uL    MPV 10.7 9.4 - 12.3 FL    nRBC 0.00 0.0 - 0.2 K/uL    Differential Type AUTOMATED      Seg Neutrophils 90 (H) 43 - 78 % Lymphocytes 5 (L) 13 - 44 %    Monocytes 4 4.0 - 12.0 %    Eosinophils % 0 (L) 0.5 - 7.8 %    Basophils 0 0.0 - 2.0 %    Immature Granulocytes 1 0.0 - 5.0 %    Segs Absolute 16.2 (H) 1.7 - 8.2 K/UL    Absolute Lymph # 0.9 0.5 - 4.6 K/UL    Absolute Mono # 0.7 0.1 - 1.3 K/UL    Absolute Eos # 0.0 0.0 - 0.8 K/UL    Basophils Absolute 0.0 0.0 - 0.2 K/UL    Absolute Immature Granulocyte 0.2 0.0 - 0.5 K/UL   Basic Metabolic Panel    Collection Time: 12/09/22  1:17 PM   Result Value Ref Range    Sodium 139 133 - 143 mmol/L    Potassium 3.4 (L) 3.5 - 5.1 mmol/L    Chloride 109 101 - 110 mmol/L    CO2 22 21 - 32 mmol/L    Anion Gap 8 2 - 11 mmol/L    Glucose 119 (H) 65 - 100 mg/dL    BUN 11 8 - 23 MG/DL    Creatinine 0.70 0.6 - 1.0 MG/DL    Est, Glom Filt Rate >60 >60 ml/min/1.73m2    Calcium 8.0 (L) 8.3 - 10.4 MG/DL   Magnesium    Collection Time: 12/09/22  6:48 PM   Result Value Ref Range    Magnesium 1.8 1.8 - 2.4 mg/dL   PLEASE READ & DOCUMENT PPD TEST IN 48 HRS    Collection Time: 12/09/22 11:00 PM   Result Value Ref Range    PPD, (POC) Negative Negative    mm Induration 0 0 - 5 mm   CBC with Auto Differential    Collection Time: 12/10/22  6:47 AM   Result Value Ref Range    WBC 10.3 4.3 - 11.1 K/uL    RBC 3.53 (L) 4.05 - 5.2 M/uL    Hemoglobin 10.2 (L) 11.7 - 15.4 g/dL    Hematocrit 32.3 (L) 35.8 - 46.3 %    MCV 91.5 82 - 102 FL    MCH 28.9 26.1 - 32.9 PG    MCHC 31.6 31.4 - 35.0 g/dL    RDW 15.7 (H) 11.9 - 14.6 %    Platelets 776 693 - 812 K/uL    MPV 10.7 9.4 - 12.3 FL    nRBC 0.00 0.0 - 0.2 K/uL    Differential Type AUTOMATED      Seg Neutrophils 77 43 - 78 %    Lymphocytes 13 13 - 44 %    Monocytes 8 4.0 - 12.0 %    Eosinophils % 1 0.5 - 7.8 %    Basophils 0 0.0 - 2.0 %    Immature Granulocytes 1 0.0 - 5.0 %    Segs Absolute 8.1 1.7 - 8.2 K/UL    Absolute Lymph # 1.3 0.5 - 4.6 K/UL    Absolute Mono # 0.8 0.1 - 1.3 K/UL    Absolute Eos # 0.1 0.0 - 0.8 K/UL    Basophils Absolute 0.0 0.0 - 0.2 K/UL    Absolute Immature Granulocyte 0.1 0.0 - 0.5 K/UL   Basic Metabolic Panel    Collection Time: 12/10/22  6:47 AM   Result Value Ref Range    Sodium 141 133 - 143 mmol/L    Potassium 2.8 (L) 3.5 - 5.1 mmol/L    Chloride 110 101 - 110 mmol/L    CO2 23 21 - 32 mmol/L    Anion Gap 8 2 - 11 mmol/L    Glucose 114 (H) 65 - 100 mg/dL    BUN 9 8 - 23 MG/DL    Creatinine 0.50 (L) 0.6 - 1.0 MG/DL    Est, Glom Filt Rate >60 >60 ml/min/1.73m2    Calcium 7.8 (L) 8.3 - 10.4 MG/DL       I have personally reviewed imaging studies showing:  CT ABDOMEN PELVIS W IV CONTRAST Additional Contrast? Oral    Result Date: 12/7/2022  Exam: CT ABDOMEN PELVIS W IV CONTRAST on 12/7/2022 7:03 PM Clinical History: The Female patient is 80years old  presenting for concern for free air in abdomen Technique: Thin slice axial images were obtained through the abdomen and pelvis with intravenous and with oral contrast.  Coronal reformatted images were also provided for review. A total of 100 ml of Iopamidol (ISOVUE-370) 76 % contrast was administered intravenously. All CT scans at this facility are performed using dose reduction/dose modulation techniques, as appropriate the performed exam, including the following: Automated Exposure Control; Adjustment of the mA and/or kV according to patient size (this includes techniques or standardized protocols for targeted exams where dose is matched to indication/reason for exam); and Use of Iterative Reconstruction Technique. Radiation Exposure Indices: Reference Air Kerma (Juan Ballesteros) = 466 mGy-cm COMPARISON:  Chest CT 12/7/2022. FINDINGS:  Abdomen: Liver: Normal size, contour, density and enhancement without focal mass. Biliary: Unremarkable gallbladder. No evidence of intra or extrahepatic biliary dilatation. Pancreas: Normal in size and contour without focal lesion. Spleen: Normal in size and contour without focal lesion. Adrenal glands: Normal in size without focal lesion.  Kidneys: Symmetric without evidence of hydronephrosis or nephrolithiasis. Normal enhancement throughout all visualized phases of contrast excretion. 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 Retroperitoneum/vasculature: No evidence of significant adenopathy. Mild aortoiliac atherosclerotic disease without evidence of aneurysmal dilatation. Abdominal soft tissues: Unremarkable. Osseous structures: Age-indeterminate moderate L2 compression fracture deformity. Pelvis: Unremarkable bladder. No significant adenopathy or free fluid. Lucyann Push Hysterectomy. 1. No acute intra-abdominal or pelvic abnormality. 2. 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 3. Age-indeterminate moderate L2 compression fracture deformity. CPT code(s): A0861491, [id number]    CT CHEST PULMONARY EMBOLISM W CONTRAST    Result Date: 12/7/2022  Exam: CT CHEST PULMONARY EMBOLISM W CONTRAST on 12/7/2022 5:28 PM Clinical History: The Female patient is 80years old  presenting for right sided chest pain, tachycardia. Technique: Thin section axial images were obtained through the chest with intravenous contrast.  Coronal reformatted images were also provided for review. A total of 100 ml of Iopamidol (ISOVUE-370) 76 % contrast was administered intravenously. All CT scans at this facility are performed using dose reduction/dose modulation techniques, as appropriate the performed exam, including the following: Automated Exposure Control; Adjustment of the mA and/or kV according to patient size (this includes techniques or standardized protocols for targeted exams where dose is matched to indication/reason for exam); and Use of Iterative Reconstruction Technique.  Radiation Exposure Indices: Reference Air Kerma (Betsy Fruits) = 176 mGy-cm Comparison:  None. FINDINGS: Images through the lungs demonstrate consolidative changes in the posterior right upper lobe. There are bronchiectatic changes at the lung bases. Normal vascular enhancement is demonstrated. No evidence of aortic dissection or aneurysmal dilatation is seen. There is no evidence of pulmonary embolic disease. There is no significant hilar or mediastinal lymphadenopathy. Images chest wall structures are unremarkable in appearance. Images through the upper abdomen demonstrate a small focus of air in the berenice hepatis concerning for possible duodenal ulcer perforation. There is a large partially imaged cyst in the left mid kidney measuring at least 6 cm. There are no acute osseous abnormalities. 1. No evidence of pulmonary embolic disease. 2. Posterior right upper lobe pneumonia. 3. Bibasilar bronchiectatic changes. 4. Small focus of free air is suggested in the berenice hepatis concerning for duodenal ulcer perforation. Clinical correlation is recommended with additional imaging as indicated. The findings contained in this report were communicated to the emergency room physician  by  Dr Aparna Rm on 12/7/2022 5:39 PM. CPT code(s) 68113       Echocardiogram:  No results found for this or any previous visit.         Orders Placed This Encounter   Medications    0.9 % sodium chloride bolus    sodium chloride flush 0.9 % injection 10 mL    iopamidol (ISOVUE-370) 76 % injection 100 mL    cefTRIAXone (ROCEPHIN) 1,000 mg in sodium chloride 0.9 % 50 mL IVPB mini-bag     Order Specific Question:   Antimicrobial Indications     Answer:   Sepsis of Unknown Etiology    azithromycin (ZITHROMAX) 500 mg in sodium chloride 0.9 % 250 mL IVPB ([id number])     Order Specific Question:   Antimicrobial Indications     Answer:   Sepsis of Unknown Etiology    acetaminophen (TYLENOL) tablet 650 mg    diatrizoate meglumine-sodium (GASTROGRAFIN) 66-10 % solution 15 mL    metronidazole (FLAGYL) 500 mg in 0.9% NaCl 100 mL IVPB premix     Order Specific Question:   Antimicrobial Indications     Answer:   Intra-Abdominal Infection    sodium chloride flush 0.9 % injection 10 mL    0.9 % sodium chloride bolus    iopamidol (ISOVUE-370) 76 % injection 80 mL    0.9 % sodium chloride bolus    0.9 % sodium chloride bolus    sodium chloride flush 0.9 % injection 5-40 mL    sodium chloride flush 0.9 % injection 5-40 mL    0.9 % sodium chloride infusion    heparin (porcine) injection 5,000 Units    OR Linked Order Group     ondansetron (ZOFRAN-ODT) disintegrating tablet 4 mg     ondansetron (ZOFRAN) injection 4 mg    polyethylene glycol (GLYCOLAX) packet 17 g    OR Linked Order Group     acetaminophen (TYLENOL) tablet 650 mg     acetaminophen (TYLENOL) suppository 650 mg    cefTRIAXone (ROCEPHIN) 1,000 mg in sodium chloride 0.9 % 50 mL IVPB mini-bag     Order Specific Question:   Antimicrobial Indications     Answer:   Pneumonia (CAP)     Order Specific Question:   CAP duration of therapy     Answer:   5 days    azithromycin (ZITHROMAX) tablet 500 mg     Order Specific Question:   Antimicrobial Indications     Answer:   Pneumonia (CAP)     Order Specific Question:   CAP duration of therapy     Answer:   5 days    albuterol (PROVENTIL) nebulizer solution 2.5 mg     Order Specific Question:   Initiate RT Bronchodilator Protocol     Answer:   Yes - Inpatient Protocol    tuberculin injection 5 Units    lactated ringers infusion    pantoprazole (PROTONIX) tablet 40 mg    calcium carbonate (TUMS) chewable tablet 500 mg    traZODone (DESYREL) tablet 50 mg    oxyCODONE (ROXICODONE) immediate release tablet 5 mg    hyoscyamine (LEVSIN/SL) sublingual tablet 125 mcg    potassium chloride (KLOR-CON M) extended release tablet 40 mEq         Signed:  Kike Ndiaye MD    Part of this note may have been written by using a voice dictation software.   The note has been proof read but may still contain some grammatical/other typographical errors.

## 2022-12-10 NOTE — PROGRESS NOTES
END OF SHIFT NOTE:    INTAKE/OUTPUT  12/09 0701 - 12/10 0700  In: 3012.1 [P.O.:680; I.V.:2332.1]  Out: 550 [Urine:550]  Voiding: Yes  Catheter: No          Flatus: Patient does have flatus present. Stool:  0 occurrences. Last BM (including prior to admit): 12/09/2022       Emesis: 0 occurrences. VITAL SIGNS  Patient Vitals for the past 12 hrs:   Temp Pulse Resp BP SpO2   12/10/22 0345 98 °F (36.7 °C) (!) 111 18 90/61 94 %   12/09/22 2330 -- 74 -- -- --   12/09/22 2329 -- 86 -- -- --   12/09/22 2323 98.6 °F (37 °C) (!) 137 18 (!) 109/55 94 %   12/09/22 1939 99.9 °F (37.7 °C) (!) 116 18 (!) 135/57 92 %   12/09/22 1910 -- (!) 116 -- -- 92 %       Ambulating  Yes    Shift report given to oncoming nurse at the bedside.     Freddy Yadav RN

## 2022-12-10 NOTE — PROGRESS NOTES
Patient has no complains, resting in bed quietly, tech informed of her HR going up on checking on her, her HR went into 130s few mins ago stayed in 110s for few mins and now its back in 80s. Hospitalist was notified. No new orders.

## 2022-12-10 NOTE — PROGRESS NOTES
END OF SHIFT NOTE:    INTAKE/OUTPUT  12/08 0701 - 12/09 0700  In: 2826.7 [P.O.:370; I.V.:2456.7]  Out: 900 [Urine:900]  Voiding: Yes  Catheter: No  Drain:              Flatus: Patient does have flatus present. Stool:  occurrences. Characteristics:  Stool Appearance: Formed  Stool Color: Brown  Stool Amount: Small  Stool Assessment  Incontinence: Yes  Stool Appearance: Formed  Stool Color: Brown  Stool Amount: Small  Stool Source: Rectum  Last BM (including prior to admit): 12/08/22    Emesis:  occurrences. Characteristics:        VITAL SIGNS  Patient Vitals for the past 12 hrs:   Temp Pulse Resp BP SpO2   12/09/22 1528 99.1 °F (37.3 °C) (!) 115 17 125/64 92 %   12/09/22 1115 97.7 °F (36.5 °C) 82 18 122/60 94 %   12/09/22 0738 97.3 °F (36.3 °C) 85 21 (!) 117/45 94 %       Pain Assessment  Pain Level: 0 (12/09/22 0759)  Pain Location: Abdomen  Patient's Stated Pain Goal: 0 - No pain    Ambulating  Yes, ambulates within the room      Shift report given to oncoming nurse at the bedside.     Martiat Montanez RN

## 2022-12-11 ENCOUNTER — APPOINTMENT (OUTPATIENT)
Dept: GENERAL RADIOLOGY | Age: 82
DRG: 871 | End: 2022-12-11
Payer: MEDICARE

## 2022-12-11 LAB
ANION GAP SERPL CALC-SCNC: 4 MMOL/L (ref 2–11)
BASOPHILS # BLD: 0 K/UL (ref 0–0.2)
BASOPHILS NFR BLD: 0 % (ref 0–2)
BUN SERPL-MCNC: 8 MG/DL (ref 8–23)
CALCIUM SERPL-MCNC: 7.8 MG/DL (ref 8.3–10.4)
CHLORIDE SERPL-SCNC: 113 MMOL/L (ref 101–110)
CO2 SERPL-SCNC: 25 MMOL/L (ref 21–32)
CREAT SERPL-MCNC: 0.4 MG/DL (ref 0.6–1)
DIFFERENTIAL METHOD BLD: ABNORMAL
EOSINOPHIL # BLD: 0.2 K/UL (ref 0–0.8)
EOSINOPHIL NFR BLD: 2 % (ref 0.5–7.8)
ERYTHROCYTE [DISTWIDTH] IN BLOOD BY AUTOMATED COUNT: 15.7 % (ref 11.9–14.6)
GLUCOSE SERPL-MCNC: 108 MG/DL (ref 65–100)
HCT VFR BLD AUTO: 29.1 % (ref 35.8–46.3)
HCT VFR BLD AUTO: 31.8 % (ref 35.8–46.3)
HGB BLD-MCNC: 9.1 G/DL (ref 11.7–15.4)
HGB BLD-MCNC: 9.8 G/DL (ref 11.7–15.4)
IMM GRANULOCYTES # BLD AUTO: 0.1 K/UL (ref 0–0.5)
IMM GRANULOCYTES NFR BLD AUTO: 1 % (ref 0–5)
LYMPHOCYTES # BLD: 1.1 K/UL (ref 0.5–4.6)
LYMPHOCYTES NFR BLD: 15 % (ref 13–44)
MCH RBC QN AUTO: 27.7 PG (ref 26.1–32.9)
MCHC RBC AUTO-ENTMCNC: 31.3 G/DL (ref 31.4–35)
MCV RBC AUTO: 88.7 FL (ref 82–102)
MONOCYTES # BLD: 0.8 K/UL (ref 0.1–1.3)
MONOCYTES NFR BLD: 11 % (ref 4–12)
NEUTS SEG # BLD: 5.2 K/UL (ref 1.7–8.2)
NEUTS SEG NFR BLD: 71 % (ref 43–78)
NRBC # BLD: 0 K/UL (ref 0–0.2)
PLATELET # BLD AUTO: 217 K/UL (ref 150–450)
PMV BLD AUTO: 10.7 FL (ref 9.4–12.3)
POTASSIUM SERPL-SCNC: 3.7 MMOL/L (ref 3.5–5.1)
RBC # BLD AUTO: 3.28 M/UL (ref 4.05–5.2)
SODIUM SERPL-SCNC: 142 MMOL/L (ref 133–143)
WBC # BLD AUTO: 7.4 K/UL (ref 4.3–11.1)

## 2022-12-11 PROCEDURE — 85018 HEMOGLOBIN: CPT

## 2022-12-11 PROCEDURE — 6360000002 HC RX W HCPCS: Performed by: INTERNAL MEDICINE

## 2022-12-11 PROCEDURE — 2580000003 HC RX 258: Performed by: INTERNAL MEDICINE

## 2022-12-11 PROCEDURE — 6370000000 HC RX 637 (ALT 250 FOR IP): Performed by: INTERNAL MEDICINE

## 2022-12-11 PROCEDURE — 94640 AIRWAY INHALATION TREATMENT: CPT

## 2022-12-11 PROCEDURE — 94761 N-INVAS EAR/PLS OXIMETRY MLT: CPT

## 2022-12-11 PROCEDURE — 71045 X-RAY EXAM CHEST 1 VIEW: CPT

## 2022-12-11 PROCEDURE — 94664 DEMO&/EVAL PT USE INHALER: CPT

## 2022-12-11 PROCEDURE — 80048 BASIC METABOLIC PNL TOTAL CA: CPT

## 2022-12-11 PROCEDURE — 2700000000 HC OXYGEN THERAPY PER DAY

## 2022-12-11 PROCEDURE — 36415 COLL VENOUS BLD VENIPUNCTURE: CPT

## 2022-12-11 PROCEDURE — 85025 COMPLETE CBC W/AUTO DIFF WBC: CPT

## 2022-12-11 PROCEDURE — 1100000000 HC RM PRIVATE

## 2022-12-11 RX ORDER — FUROSEMIDE 10 MG/ML
20 INJECTION INTRAMUSCULAR; INTRAVENOUS ONCE
Status: COMPLETED | OUTPATIENT
Start: 2022-12-11 | End: 2022-12-11

## 2022-12-11 RX ORDER — BUDESONIDE 0.5 MG/2ML
0.5 INHALANT ORAL 2 TIMES DAILY
Status: DISCONTINUED | OUTPATIENT
Start: 2022-12-11 | End: 2022-12-14 | Stop reason: HOSPADM

## 2022-12-11 RX ORDER — IPRATROPIUM BROMIDE AND ALBUTEROL SULFATE 2.5; .5 MG/3ML; MG/3ML
1 SOLUTION RESPIRATORY (INHALATION)
Status: DISCONTINUED | OUTPATIENT
Start: 2022-12-11 | End: 2022-12-11

## 2022-12-11 RX ORDER — L. ACIDOPHILUS/L.BULGARICUS 1MM CELL
4 TABLET ORAL 3 TIMES DAILY
Status: DISCONTINUED | OUTPATIENT
Start: 2022-12-11 | End: 2022-12-14 | Stop reason: HOSPADM

## 2022-12-11 RX ORDER — IPRATROPIUM BROMIDE AND ALBUTEROL SULFATE 2.5; .5 MG/3ML; MG/3ML
1 SOLUTION RESPIRATORY (INHALATION)
Status: DISCONTINUED | OUTPATIENT
Start: 2022-12-11 | End: 2022-12-14 | Stop reason: HOSPADM

## 2022-12-11 RX ADMIN — POTASSIUM CHLORIDE 40 MEQ: 1500 TABLET, EXTENDED RELEASE ORAL at 08:27

## 2022-12-11 RX ADMIN — SODIUM CHLORIDE, POTASSIUM CHLORIDE, SODIUM LACTATE AND CALCIUM CHLORIDE: 600; 310; 30; 20 INJECTION, SOLUTION INTRAVENOUS at 16:30

## 2022-12-11 RX ADMIN — LACTOBACILLUS TAB 4 TABLET: TAB at 20:51

## 2022-12-11 RX ADMIN — SODIUM CHLORIDE, POTASSIUM CHLORIDE, SODIUM LACTATE AND CALCIUM CHLORIDE: 600; 310; 30; 20 INJECTION, SOLUTION INTRAVENOUS at 05:27

## 2022-12-11 RX ADMIN — BUDESONIDE 500 MCG: 0.5 INHALANT RESPIRATORY (INHALATION) at 13:32

## 2022-12-11 RX ADMIN — AZITHROMYCIN MONOHYDRATE 500 MG: 250 TABLET ORAL at 08:26

## 2022-12-11 RX ADMIN — FUROSEMIDE 20 MG: 10 INJECTION, SOLUTION INTRAMUSCULAR; INTRAVENOUS at 18:58

## 2022-12-11 RX ADMIN — IPRATROPIUM BROMIDE AND ALBUTEROL SULFATE 1 AMPULE: 2.5; .5 SOLUTION RESPIRATORY (INHALATION) at 19:11

## 2022-12-11 RX ADMIN — SODIUM CHLORIDE, PRESERVATIVE FREE 10 ML: 5 INJECTION INTRAVENOUS at 20:52

## 2022-12-11 RX ADMIN — HEPARIN SODIUM 5000 UNITS: 5000 INJECTION INTRAVENOUS; SUBCUTANEOUS at 20:52

## 2022-12-11 RX ADMIN — BUDESONIDE 500 MCG: 0.5 INHALANT RESPIRATORY (INHALATION) at 19:11

## 2022-12-11 RX ADMIN — HEPARIN SODIUM 5000 UNITS: 5000 INJECTION INTRAVENOUS; SUBCUTANEOUS at 08:27

## 2022-12-11 RX ADMIN — IPRATROPIUM BROMIDE AND ALBUTEROL SULFATE 1 AMPULE: 2.5; .5 SOLUTION RESPIRATORY (INHALATION) at 13:33

## 2022-12-11 RX ADMIN — LACTOBACILLUS TAB 4 TABLET: TAB at 14:25

## 2022-12-11 RX ADMIN — CEFTRIAXONE 1000 MG: 1 INJECTION, POWDER, FOR SOLUTION INTRAMUSCULAR; INTRAVENOUS at 16:30

## 2022-12-11 RX ADMIN — PANTOPRAZOLE SODIUM 40 MG: 40 TABLET, DELAYED RELEASE ORAL at 05:27

## 2022-12-11 RX ADMIN — ACETAMINOPHEN 650 MG: 325 TABLET, FILM COATED ORAL at 03:46

## 2022-12-11 RX ADMIN — SODIUM CHLORIDE, PRESERVATIVE FREE 10 ML: 5 INJECTION INTRAVENOUS at 08:27

## 2022-12-11 RX ADMIN — IPRATROPIUM BROMIDE AND ALBUTEROL SULFATE 1 AMPULE: 2.5; .5 SOLUTION RESPIRATORY (INHALATION) at 15:07

## 2022-12-11 ASSESSMENT — PAIN DESCRIPTION - ORIENTATION: ORIENTATION: POSTERIOR

## 2022-12-11 ASSESSMENT — PAIN SCALES - GENERAL
PAINLEVEL_OUTOF10: 3
PAINLEVEL_OUTOF10: 0
PAINLEVEL_OUTOF10: 0

## 2022-12-11 ASSESSMENT — PAIN DESCRIPTION - DESCRIPTORS: DESCRIPTORS: ACHING

## 2022-12-11 ASSESSMENT — PAIN DESCRIPTION - PAIN TYPE: TYPE: ACUTE PAIN

## 2022-12-11 ASSESSMENT — PAIN DESCRIPTION - LOCATION: LOCATION: BACK

## 2022-12-11 ASSESSMENT — PAIN DESCRIPTION - ONSET: ONSET: SUDDEN

## 2022-12-11 NOTE — PROGRESS NOTES
END OF SHIFT NOTE:    INTAKE/OUTPUT  12/09 0701 - 12/10 0700  In: 3012.1 [P.O.:680; I.V.:2332.1]  Out: 550 [Urine:550]  Voiding: Yes  Catheter: No  Drain:      Flatus: Patient does have flatus present. Stool: 2 occurrences. Characteristics:  Stool Appearance: Soft  Stool Color: Brown  Stool Amount: Small  Stool Assessment  Incontinence: Yes  Stool Appearance: Soft  Stool Color: Brown  Stool Amount: Small  Stool Source: Rectum  Last BM (including prior to admit): 12/10/22    Emesis: 0 occurrences. Characteristics:        VITAL SIGNS  Patient Vitals for the past 12 hrs:   Temp Pulse Resp BP SpO2   12/10/22 1615 98.7 °F (37.1 °C) (!) 109 24 128/69 98 %   12/10/22 1158 98 °F (36.7 °C) (!) 103 18 127/62 92 %       Pain Assessment  Pain Level: 0 (12/10/22 0847)  Pain Location: Chest  Patient's Stated Pain Goal: 0 - No pain    Ambulating  Yes    Shift report given to oncoming nurse at the bedside.     Rosario Shaikh RN

## 2022-12-11 NOTE — PROGRESS NOTES
Patient stated she needed her 5 mg Melatonin for sleep tonight because Trazodone was to strong and caused her to have nightmares. Dr. Colleen Garsia hospitalist notified and new order received.

## 2022-12-11 NOTE — PROGRESS NOTES
Hospitalist History and Physical   Admit Date:  2022  3:15 PM   Name:  Steffen Tamez   Age:  80 y.o. Sex:  female  :  1940   MRN:  [id number]   Room:      Presenting Complaint: Chest Pain     Reason(s) for Admission: Peritoneal free air [K66.8]  Severe sepsis ([address] [A41.9, R65.20]  Pneumonia of right upper lobe due to infectious organism [J18.9]  Chest pain, unspecified type [R07.9]  Sepsis, due to unspecified organism, unspecified whether acute organ dysfunction present ([address] [A41.9]     History of Present Illness:   Steffen Tamez is a 80 y.o. female with medical history of Afib s/p PPM (not on AC) presents with sudden onset worsening shortness of breath and fevers that started earlier today. She says \"I have had pneumonia in the past a few times and this feels like it. \" Denies any sick contacts with family or neighbors. Denies chest pain. Says \"I have just been so tired and I could sleep all day. \" Does have some shortness of breath. Denies abdominal pain, nausea/vomiting or diarrhea. Not sure but believes she received two \"pneumonia vaccines in the past\" from her PCP. Only takes OTC medications (vitamin D, biotin). Does not take any prescription medications at all. Of note, incidental finding of possible duodenal perforation seen on CT imaging (was originally ordered to evaluate for PE). Patient does have \"some reflux whenever I eat something acidic\" but denies nausea/vomiting or diarrhea. She has has not had any problems with eating or drinking and denies abdominal pain. ED Course: Sodium of 131. LA of 2.1. WBC count of 24k. Blood cultures collected x2. CT chest negative for PE. Initial Ct chest also suggested duodenal perforation and Flagyl added to antibiotic regimen; however, dedicated CT abd/pelvis ordered to evaluate for perforation and is negative for any intra-abdominal pathology. Given broad spectrum antibiotics and fluids. Hospitalist consulted for admission. 12/11/2022  Patient seen and evaluated. States she is feeling better   Was febrile overnight to 102.6   Dyspnea and wheezing noted today   denies worsening shortness of breath nausea vomiting or chills  Afebrile overnight    Assessment & Plan:     Principal Problem:    Severe sepsis ([facility]  - Due to RLL pneumonia  - follow blood cultures  - stop Flagyl  - CTX empirically   - azithromycin for atypical coverage  - Legionella and pneumococcus urine antigens ordered  - IVF resuscitation    12/11/2022  Continue antibiotics  cultures no growth to date    # Acute hypoxic respiratory failure  - CT chest negative for PE  - likely due to above  - management as above  - jet nebs as needed  - no smoking history and no history of pulmonary issues  - wean supplemental oxygen as tolerated  - goal SpO2>90%    12/11/2022  Continue supplemental oxygen  Wean as tolerated    # Hyponatremia  - likely related to above and poor po intake  - fluids as above  - monitor with AM labs    12/11/2022  Resolved    Hypomagnesemia  12/11/2022  Resolved    Hypokalemia  Replace    Wheezing and dyspnea  Start DuoNebs  Start Pulmicort  DC IV fluids  Lasix 20 IV x1      Fever  102.6 overnight  Unclear infection versus fluke  Will monitor          Anticipated discharge needs:   None  Would like to speak to case management    Diet: ADULT DIET; Regular  VTE ppx: heparin   Code status: Full Code    Hospital Problems:  Principal Problem:    Severe sepsis ([facility]  Active Problems:    Acute respiratory failure with hypoxemia (HCC)    Hyponatremia  Resolved Problems:    * No resolved hospital problems.  *       Objective:   Patient Vitals for the past 24 hrs:   Temp Pulse Resp BP SpO2   12/11/22 0734 97.5 °F (36.4 °C) (!) 109 22 135/70 93 %   12/11/22 0346 98 °F (36.7 °C) 99 20 133/67 96 %   12/11/22 0021 97.9 °F (36.6 °C) (!) 103 20 125/67 96 %   12/10/22 2130 98.6 °F (37 °C) -- -- -- --   12/10/22 2016 (!) 102.6 °F (39.2 °C) (!) 112 20 133/69 95 % 12/10/22 1615 98.7 °F (37.1 °C) (!) 109 24 128/69 98 %   12/10/22 1158 98 °F (36.7 °C) (!) 103 18 127/62 92 %         Oxygen Therapy  SpO2: 93 %  Pulse via Oximetry: 108 beats per minute  Pulse Oximeter Device Mode: Intermittent  O2 Device: Nasal cannula  O2 Flow Rate (L/min): 2 L/min  O2 Delivery Method: Nasal cannula    Estimated body mass index is 19.19 kg/m² as calculated from the following:    Height as of this encounter: 4' 11\" (1.499 m). Weight as of this encounter: 95 lb (43.1 kg). Intake/Output Summary (Last 24 hours) at 12/11/2022 1115  Last data filed at 12/10/2022 1644  Gross per 24 hour   Intake 2316 ml   Output 300 ml   Net 2016 ml           Physical Exam:    Blood pressure 135/70, pulse (!) 109, temperature 97.5 °F (36.4 °C), temperature source Oral, resp. rate 22, height 4' 11\" (1.499 m), weight 95 lb (43.1 kg), SpO2 93 %. General:    Well nourished. Very pleasant  Head:  Normocephalic, atraumatic  Eyes:  Sclerae appear normal.  Pupils equally round. ENT:  Nares appear normal, no drainage. Moist oral mucosa  Neck:  No restricted ROM. Trachea midline   CV:   RRR. No jugular venous distension. Lungs:   Entry equal bilaterally noted dyspnea bilateral wheezing, no rhonchi, or rales. Symmetric expansion. Abdomen: Bowel sounds present. Soft, nontender, nondistended. Extremities: No cyanosis or clubbing. No edema  Skin:     No rashes and normal coloration. Warm and dry. Neuro:  CN II-XII grossly intact. Sensation intact. A&Ox3  Psych:  Normal mood and affect.       I have personally reviewed labs and tests showing:  Recent Labs:  Recent Results (from the past 24 hour(s))   PLEASE READ & DOCUMENT PPD TEST IN 72 HRS    Collection Time: 12/10/22 11:04 PM   Result Value Ref Range    PPD, (POC) Negative Negative    mm Induration 0 0 - 5 mm   CBC with Auto Differential    Collection Time: 12/11/22  7:20 AM   Result Value Ref Range    WBC 7.4 4.3 - 11.1 K/uL    RBC 3.28 (L) 4.05 - 5.2 M/uL    Hemoglobin 9.1 (L) 11.7 - 15.4 g/dL    Hematocrit 29.1 (L) 35.8 - 46.3 %    MCV 88.7 82 - 102 FL    MCH 27.7 26.1 - 32.9 PG    MCHC 31.3 (L) 31.4 - 35.0 g/dL    RDW 15.7 (H) 11.9 - 14.6 %    Platelets 079 552 - 257 K/uL    MPV 10.7 9.4 - 12.3 FL    nRBC 0.00 0.0 - 0.2 K/uL    Differential Type AUTOMATED      Seg Neutrophils 71 43 - 78 %    Lymphocytes 15 13 - 44 %    Monocytes 11 4.0 - 12.0 %    Eosinophils % 2 0.5 - 7.8 %    Basophils 0 0.0 - 2.0 %    Immature Granulocytes 1 0.0 - 5.0 %    Segs Absolute 5.2 1.7 - 8.2 K/UL    Absolute Lymph # 1.1 0.5 - 4.6 K/UL    Absolute Mono # 0.8 0.1 - 1.3 K/UL    Absolute Eos # 0.2 0.0 - 0.8 K/UL    Basophils Absolute 0.0 0.0 - 0.2 K/UL    Absolute Immature Granulocyte 0.1 0.0 - 0.5 K/UL   Basic Metabolic Panel    Collection Time: 12/11/22  7:20 AM   Result Value Ref Range    Sodium 142 133 - 143 mmol/L    Potassium 3.7 3.5 - 5.1 mmol/L    Chloride 113 (H) 101 - 110 mmol/L    CO2 25 21 - 32 mmol/L    Anion Gap 4 2 - 11 mmol/L    Glucose 108 (H) 65 - 100 mg/dL    BUN 8 8 - 23 MG/DL    Creatinine 0.40 (L) 0.6 - 1.0 MG/DL    Est, Glom Filt Rate >60 >60 ml/min/1.73m2    Calcium 7.8 (L) 8.3 - 10.4 MG/DL       I have personally reviewed imaging studies showing:  CT ABDOMEN PELVIS W IV CONTRAST Additional Contrast? Oral    Result Date: 12/7/2022  Exam: CT ABDOMEN PELVIS W IV CONTRAST on 12/7/2022 7:03 PM Clinical History: The Female patient is 80years old  presenting for concern for free air in abdomen Technique: Thin slice axial images were obtained through the abdomen and pelvis with intravenous and with oral contrast.  Coronal reformatted images were also provided for review. A total of 100 ml of Iopamidol (ISOVUE-370) 76 % contrast was administered intravenously. All CT scans at this facility are performed using dose reduction/dose modulation techniques, as appropriate the performed exam, including the following: Automated Exposure Control;  Adjustment of the mA and/or kV according to patient size (this includes techniques or standardized protocols for targeted exams where dose is matched to indication/reason for exam); and Use of Iterative Reconstruction Technique. Radiation Exposure Indices: Reference Air Kerma (Rosan Gabriela) = 466 mGy-cm COMPARISON:  Chest CT 12/7/2022. FINDINGS:  Abdomen: Liver: Normal size, contour, density and enhancement without focal mass. Biliary: Unremarkable gallbladder. No evidence of intra or extrahepatic biliary dilatation. Pancreas: Normal in size and contour without focal lesion. Spleen: Normal in size and contour without focal lesion. Adrenal glands: Normal in size without focal lesion. Kidneys: Symmetric without evidence of hydronephrosis or nephrolithiasis. Normal enhancement throughout all visualized phases of contrast excretion. 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 Retroperitoneum/vasculature: No evidence of significant adenopathy. Mild aortoiliac atherosclerotic disease without evidence of aneurysmal dilatation. Abdominal soft tissues: Unremarkable. Osseous structures: Age-indeterminate moderate L2 compression fracture deformity. Pelvis: Unremarkable bladder. No significant adenopathy or free fluid. [place]ste[place]. 1. No acute intra-abdominal or pelvic abnormality. 2. 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 3. Age-indeterminate moderate L2 compression fracture deformity. CPT code(s): L8126197, [id number]    CT CHEST PULMONARY EMBOLISM W CONTRAST    Result Date: 12/7/2022  Exam: CT CHEST PULMONARY EMBOLISM W CONTRAST on 12/7/2022 5:28 PM Clinical History: The Female patient is 80years old  presenting for right sided chest pain, tachycardia. Technique:  Thin section axial images were obtained through the chest with intravenous contrast.  Coronal reformatted images were also provided for review. A total of 100 ml of Iopamidol (ISOVUE-370) 76 % contrast was administered intravenously. All CT scans at this facility are performed using dose reduction/dose modulation techniques, as appropriate the performed exam, including the following: Automated Exposure Control; Adjustment of the mA and/or kV according to patient size (this includes techniques or standardized protocols for targeted exams where dose is matched to indication/reason for exam); and Use of Iterative Reconstruction Technique. Radiation Exposure Indices: Reference Air Kerma (Ka,r) = 176 mGy-cm Comparison:  None. FINDINGS: Images through the lungs demonstrate consolidative changes in the posterior right upper lobe. There are bronchiectatic changes at the lung bases. Normal vascular enhancement is demonstrated. No evidence of aortic dissection or aneurysmal dilatation is seen. There is no evidence of pulmonary embolic disease. There is no significant hilar or mediastinal lymphadenopathy. Images chest wall structures are unremarkable in appearance. Images through the upper abdomen demonstrate a small focus of air in the berenice hepatis concerning for possible duodenal ulcer perforation. There is a large partially imaged cyst in the left mid kidney measuring at least 6 cm. There are no acute osseous abnormalities. 1. No evidence of pulmonary embolic disease. 2. Posterior right upper lobe pneumonia. 3. Bibasilar bronchiectatic changes. 4. Small focus of free air is suggested in the berenice hepatis concerning for duodenal ulcer perforation. Clinical correlation is recommended with additional imaging as indicated.  The findings contained in this report were communicated to the emergency room physician  by  Dr Philomena Gastelum on 12/7/2022 5:39 PM. CPT code(s) 92535       Echocardiogram:  No results found for this or any previous visit.         Orders Placed This Encounter   Medications    0.9 % sodium chloride bolus    sodium chloride flush 0.9 % injection 10 mL    iopamidol (ISOVUE-370) 76 % injection 100 mL    cefTRIAXone (ROCEPHIN) 1,000 mg in sodium chloride 0.9 % 50 mL IVPB mini-bag     Order Specific Question:   Antimicrobial Indications     Answer:   Sepsis of Unknown Etiology    azithromycin (ZITHROMAX) 500 mg in sodium chloride 0.9 % 250 mL IVPB ([id number])     Order Specific Question:   Antimicrobial Indications     Answer:   Sepsis of Unknown Etiology    acetaminophen (TYLENOL) tablet 650 mg    diatrizoate meglumine-sodium (GASTROGRAFIN) 66-10 % solution 15 mL    metronidazole (FLAGYL) 500 mg in 0.9% NaCl 100 mL IVPB premix     Order Specific Question:   Antimicrobial Indications     Answer:   Intra-Abdominal Infection    sodium chloride flush 0.9 % injection 10 mL    0.9 % sodium chloride bolus    iopamidol (ISOVUE-370) 76 % injection 80 mL    0.9 % sodium chloride bolus    0.9 % sodium chloride bolus    sodium chloride flush 0.9 % injection 5-40 mL    sodium chloride flush 0.9 % injection 5-40 mL    0.9 % sodium chloride infusion    heparin (porcine) injection 5,000 Units    OR Linked Order Group     ondansetron (ZOFRAN-ODT) disintegrating tablet 4 mg     ondansetron (ZOFRAN) injection 4 mg    polyethylene glycol (GLYCOLAX) packet 17 g    OR Linked Order Group     acetaminophen (TYLENOL) tablet 650 mg     acetaminophen (TYLENOL) suppository 650 mg    cefTRIAXone (ROCEPHIN) 1,000 mg in sodium chloride 0.9 % 50 mL IVPB mini-bag     Order Specific Question:   Antimicrobial Indications     Answer:   Pneumonia (CAP)     Order Specific Question:   CAP duration of therapy     Answer:   5 days    azithromycin (ZITHROMAX) tablet 500 mg     Order Specific Question:   Antimicrobial Indications     Answer:   Pneumonia (CAP)     Order Specific Question:   CAP duration of therapy     Answer:   5 days    albuterol (PROVENTIL) nebulizer solution 2.5 mg     Order Specific Question:   Initiate RT Bronchodilator Protocol     Answer:   Yes - Inpatient Protocol    tuberculin injection 5 Units    lactated ringers infusion    pantoprazole (PROTONIX) tablet 40 mg    calcium carbonate (TUMS) chewable tablet 500 mg    traZODone (DESYREL) tablet 50 mg    oxyCODONE (ROXICODONE) immediate release tablet 5 mg    hyoscyamine (LEVSIN/SL) sublingual tablet 125 mcg    potassium chloride (KLOR-CON M) extended release tablet 40 mEq    melatonin tablet 4.5 mg    lactobacillus acidophilus (FLORANEX) 4 tablet    ipratropium-albuterol (DUONEB) nebulizer solution 1 ampule     Order Specific Question:   Initiate RT Bronchodilator Protocol     Answer:   Yes - Inpatient Protocol    budesonide (PULMICORT) nebulizer suspension 500 mcg         Signed:  Ab Douglas MD    Part of this note may have been written by using a voice dictation software. The note has been proof read but may still contain some grammatical/other typographical errors.

## 2022-12-12 LAB
ANION GAP SERPL CALC-SCNC: 6 MMOL/L (ref 2–11)
BACTERIA SPEC CULT: NORMAL
BACTERIA SPEC CULT: NORMAL
BASOPHILS # BLD: 0 K/UL (ref 0–0.2)
BASOPHILS NFR BLD: 0 % (ref 0–2)
BUN SERPL-MCNC: 6 MG/DL (ref 8–23)
CALCIUM SERPL-MCNC: 8 MG/DL (ref 8.3–10.4)
CHLORIDE SERPL-SCNC: 107 MMOL/L (ref 101–110)
CO2 SERPL-SCNC: 26 MMOL/L (ref 21–32)
CREAT SERPL-MCNC: 0.4 MG/DL (ref 0.6–1)
DIFFERENTIAL METHOD BLD: ABNORMAL
EOSINOPHIL # BLD: 0.3 K/UL (ref 0–0.8)
EOSINOPHIL NFR BLD: 4 % (ref 0.5–7.8)
ERYTHROCYTE [DISTWIDTH] IN BLOOD BY AUTOMATED COUNT: 15.9 % (ref 11.9–14.6)
GLUCOSE SERPL-MCNC: 106 MG/DL (ref 65–100)
HCT VFR BLD AUTO: 29.4 % (ref 35.8–46.3)
HGB BLD-MCNC: 9.4 G/DL (ref 11.7–15.4)
IMM GRANULOCYTES # BLD AUTO: 0.1 K/UL (ref 0–0.5)
IMM GRANULOCYTES NFR BLD AUTO: 1 % (ref 0–5)
LYMPHOCYTES # BLD: 1.4 K/UL (ref 0.5–4.6)
LYMPHOCYTES NFR BLD: 21 % (ref 13–44)
MCH RBC QN AUTO: 28.7 PG (ref 26.1–32.9)
MCHC RBC AUTO-ENTMCNC: 32 G/DL (ref 31.4–35)
MCV RBC AUTO: 89.6 FL (ref 82–102)
MONOCYTES # BLD: 0.8 K/UL (ref 0.1–1.3)
MONOCYTES NFR BLD: 12 % (ref 4–12)
NEUTS SEG # BLD: 4.2 K/UL (ref 1.7–8.2)
NEUTS SEG NFR BLD: 62 % (ref 43–78)
NRBC # BLD: 0 K/UL (ref 0–0.2)
NT PRO BNP: ABNORMAL PG/ML
PLATELET # BLD AUTO: 291 K/UL (ref 150–450)
PMV BLD AUTO: 10.6 FL (ref 9.4–12.3)
POTASSIUM SERPL-SCNC: 3.8 MMOL/L (ref 3.5–5.1)
RBC # BLD AUTO: 3.28 M/UL (ref 4.05–5.2)
SERVICE CMNT-IMP: NORMAL
SERVICE CMNT-IMP: NORMAL
SODIUM SERPL-SCNC: 139 MMOL/L (ref 133–143)
WBC # BLD AUTO: 6.8 K/UL (ref 4.3–11.1)

## 2022-12-12 PROCEDURE — 97530 THERAPEUTIC ACTIVITIES: CPT

## 2022-12-12 PROCEDURE — 6370000000 HC RX 637 (ALT 250 FOR IP): Performed by: INTERNAL MEDICINE

## 2022-12-12 PROCEDURE — 94761 N-INVAS EAR/PLS OXIMETRY MLT: CPT

## 2022-12-12 PROCEDURE — 85025 COMPLETE CBC W/AUTO DIFF WBC: CPT

## 2022-12-12 PROCEDURE — 6360000002 HC RX W HCPCS: Performed by: INTERNAL MEDICINE

## 2022-12-12 PROCEDURE — 83880 ASSAY OF NATRIURETIC PEPTIDE: CPT

## 2022-12-12 PROCEDURE — 80048 BASIC METABOLIC PNL TOTAL CA: CPT

## 2022-12-12 PROCEDURE — 1100000000 HC RM PRIVATE

## 2022-12-12 PROCEDURE — 2580000003 HC RX 258: Performed by: INTERNAL MEDICINE

## 2022-12-12 PROCEDURE — 2700000000 HC OXYGEN THERAPY PER DAY

## 2022-12-12 PROCEDURE — 97535 SELF CARE MNGMENT TRAINING: CPT

## 2022-12-12 PROCEDURE — 94640 AIRWAY INHALATION TREATMENT: CPT

## 2022-12-12 PROCEDURE — 36415 COLL VENOUS BLD VENIPUNCTURE: CPT

## 2022-12-12 RX ORDER — FUROSEMIDE 10 MG/ML
20 INJECTION INTRAMUSCULAR; INTRAVENOUS ONCE
Status: COMPLETED | OUTPATIENT
Start: 2022-12-12 | End: 2022-12-12

## 2022-12-12 RX ADMIN — BUDESONIDE 500 MCG: 0.5 INHALANT RESPIRATORY (INHALATION) at 07:16

## 2022-12-12 RX ADMIN — LACTOBACILLUS TAB 4 TABLET: TAB at 08:33

## 2022-12-12 RX ADMIN — IPRATROPIUM BROMIDE AND ALBUTEROL SULFATE 1 AMPULE: 2.5; .5 SOLUTION RESPIRATORY (INHALATION) at 14:09

## 2022-12-12 RX ADMIN — HEPARIN SODIUM 5000 UNITS: 5000 INJECTION INTRAVENOUS; SUBCUTANEOUS at 20:39

## 2022-12-12 RX ADMIN — IPRATROPIUM BROMIDE AND ALBUTEROL SULFATE 1 AMPULE: 2.5; .5 SOLUTION RESPIRATORY (INHALATION) at 19:17

## 2022-12-12 RX ADMIN — BUDESONIDE 500 MCG: 0.5 INHALANT RESPIRATORY (INHALATION) at 19:17

## 2022-12-12 RX ADMIN — LACTOBACILLUS TAB 4 TABLET: TAB at 20:39

## 2022-12-12 RX ADMIN — HEPARIN SODIUM 5000 UNITS: 5000 INJECTION INTRAVENOUS; SUBCUTANEOUS at 08:34

## 2022-12-12 RX ADMIN — SODIUM CHLORIDE, PRESERVATIVE FREE 10 ML: 5 INJECTION INTRAVENOUS at 20:44

## 2022-12-12 RX ADMIN — PANTOPRAZOLE SODIUM 40 MG: 40 TABLET, DELAYED RELEASE ORAL at 05:56

## 2022-12-12 RX ADMIN — FUROSEMIDE 20 MG: 10 INJECTION, SOLUTION INTRAMUSCULAR; INTRAVENOUS at 11:14

## 2022-12-12 RX ADMIN — IPRATROPIUM BROMIDE AND ALBUTEROL SULFATE 1 AMPULE: 2.5; .5 SOLUTION RESPIRATORY (INHALATION) at 07:16

## 2022-12-12 RX ADMIN — LACTOBACILLUS TAB 4 TABLET: TAB at 14:12

## 2022-12-12 ASSESSMENT — PAIN SCALES - GENERAL
PAINLEVEL_OUTOF10: 0
PAINLEVEL_OUTOF10: 0

## 2022-12-12 NOTE — PROGRESS NOTES
Spiritual Care Visit. Initial visit. Patient was visited by [facility]. Entered by [facility] Margie Clements, Staff Chaplain. Ortiz, [place]

## 2022-12-12 NOTE — PROGRESS NOTES
ACUTE PHYSICAL THERAPY GOALS:   (Developed with and agreed upon by patient and/or caregiver.)    (1.) Katy Thelma  will move from supine to sit and sit to supine , scoot up and down, and roll side to side with MODIFIED INDEPENDENCE within 7 treatment day(s). (2.) Katy Thelma will transfer from bed to chair and chair to bed with MODIFIED INDEPENDENCE using the least restrictive device within 7 treatment day(s). (3.) Katy Thelma will ambulate with SUPERVISION for 500 feet with the least restrictive device within 7 treatment day(s). (4.) Katy Thelma will perform standing static and dynamic balance activities x 10 minutes with SUPERVISION to improve safety within 7 treatment day(s). (5.) Katy Thelma will perform bilateral lower extremity exercises x 15 min for HEP with MODIFIED INDEPENDENCE to improve strength, endurance, and functional mobility within 7 treatment day(s). PHYSICAL THERAPY: Daily Note PM   (Link to Caseload Tracking: PT Visit Days : 2  Time In/Out PT Charge Capture  Rehab Caseload Tracker  Orders    Cristina Noonan is a 80 y.o. female   PRIMARY DIAGNOSIS: Severe sepsis ([address]  Peritoneal free air [K66.8]  Severe sepsis ([address] [A41.9, R65.20]  Pneumonia of right upper lobe due to infectious organism [J18.9]  Chest pain, unspecified type [R07.9]  Sepsis, due to unspecified organism, unspecified whether acute organ dysfunction present ([address] [A41.9]       Inpatient: Payor: Mal Peter / Plan: [employer] / Product Type: *No Product type* /     ASSESSMENT:     REHAB RECOMMENDATIONS:   Recommendation to date pending progress:  Setting:  Home Health Therapy    Equipment:    None     ASSESSMENT:  Ms. Leslie Muller is up in chair upon arrival and agreeable to participate. Patient reports she feels much better today. She performs transfers and mobility in her room with supervision using no AD.  She is then able to ambulate out in hallway for 500 ft using no AD at [place] [address] with emphasis on even step length and increased heel strike throughout. She does occasionally grab onto handrail on wall for support but overall is very steady. Patient remains on 2L O2 throughout session and O2 remains >90% throughout. Patient does report some SOB and fatigue compared to her baseline but overall is making great progress. Will continue to follow.       SUBJECTIVE:   Ms. Ariela Gagnon states, \"I feel so much more like myself\"     Social/Functional Lives With: Spouse  Type of Home: House  Home Layout: One level  Home Access: Ramped entrance  ADL Assistance: Independent  Homemaking Assistance: Independent  Ambulation Assistance: Independent  OBJECTIVE:     PAIN: Godwin Haggis / O2: Yoan Mina / Poncho Hernández / Rocio Laytones:   Pre Treatment: 0/10         Post Treatment: 0/10 Vitals        Oxygen: remains >90% on 2L    None    RESTRICTIONS/PRECAUTIONS:        MOBILITY: I Mod I S SBA CGA Min Mod Max Total  NT x2 Comments:   Bed Mobility    Rolling [] [] [] [] [] [] [] [] [] [x] []    Supine to Sit [] [] [] [] [] [] [] [] [] [x] [] Up in chair upon arrival   Scooting [] [] [] [] [] [] [] [] [] [x] []    Sit to Supine [] [] [] [] [] [] [] [] [] [x] []    Transfers    Sit to Stand [] [] [x] [] [] [] [] [] [] [] []    Chair to toilet [] [] [x] [] [] [] [] [] [] [] []    Stand to Sit [] [] [x] [] [] [] [] [] [] [] []     [] [] [] [] [] [] [] [] [] [] []    I=Independent, Mod I=Modified Independent, S=Supervision, SBA=Standby Assistance, CGA=Contact Guard Assistance,   Min=Minimal Assistance, Mod=Moderate Assistance, Max=Maximal Assistance, Total=Total Assistance, NT=Not Tested    BALANCE: Good Fair+ Fair Fair- Poor NT Comments   Sitting Static [x] [] [] [] [] []    Sitting Dynamic [x] [] [] [] [] []              Standing Static [] [] [x] [] [] []    Standing Dynamic [] [] [x] [] [] []      GAIT: I Mod I S SBA CGA Min Mod Max Total  NT x2 Comments:   Level of Assistance [] [] [] [x] [] [] [] [] [] [] []    Distance 500 feet    DME None Gait Quality Decreased step clearance, Decreased step length, Decreased stance, and Path deviations     Weightbearing Status      Stairs      I=Independent, Mod I=Modified Independent, S=Supervision, SBA=Standby Assistance, CGA=Contact Guard Assistance,   Min=Minimal Assistance, Mod=Moderate Assistance, Max=Maximal Assistance, Total=Total Assistance, NT=Not Tested    PLAN:   FREQUENCY AND DURATION: 3 times/week for duration of hospital stay or until stated goals are met, whichever comes first.    TREATMENT:   TREATMENT:   Therapeutic Activity (23 Minutes): Therapeutic activity included Transfer Training, Ambulation on level ground, Sitting balance , and Standing balance to improve functional Activity tolerance, Balance, Coordination, Mobility, and Strength.     TREATMENT GRID:  N/A    AFTER TREATMENT PRECAUTIONS: Bed/Chair Locked, Call light within reach, Chair, Needs within reach, and RN notified    INTERDISCIPLINARY COLLABORATION:  RN/ PCT    EDUCATION:      TIME IN/OUT:  Time In: 1257  Time Out: 36  Minutes: [address] [place], [place]

## 2022-12-12 NOTE — CARE COORDINATION
LOS 5d    Chart reviewed by [facility]. Patient with RLL PNA, on IV abs. Patient requiring O2 with ambulation. Does not have/use O2 PTA. Patient is primary care giver for  at home. Therapy recommending home health when stable. CM following.

## 2022-12-12 NOTE — PROGRESS NOTES
END OF SHIFT NOTE:    INTAKE/OUTPUT  12/10 0701 - 12/11 0700  In: 2436 [P.O.:1370; I.V.:1066]  Out: 700 [Urine:700]  Voiding: Yes  Catheter: No  Drain:      Flatus: Patient does have flatus present. Stool: 2 occurrences. Characteristics:  Stool Appearance: Soft  Stool Color: Brown  Stool Amount: Small  Stool Assessment  Incontinence: Yes  Stool Appearance: Soft  Stool Color: Brown  Stool Amount: Small  Stool Source: Rectum  Last BM (including prior to admit): 12/11/22    Emesis: 0 occurrences. Characteristics:        VITAL SIGNS  Patient Vitals for the past 12 hrs:   Temp Pulse Resp BP SpO2   12/11/22 1913 -- (!) 114 22 -- 97 %   12/11/22 1533 98.2 °F (36.8 °C) (!) 110 22 136/69 97 %   12/11/22 1507 -- (!) 110 20 -- 94 %   12/11/22 1335 -- (!) 114 22 -- 97 %       Pain Assessment  Pain Level: 0 (12/11/22 0440)  Pain Location: Back  Patient's Stated Pain Goal: 0 - No pain    Ambulating  Yes in room    Shift report given to oncoming nurse at the bedside.     Gil Quevedo RN

## 2022-12-12 NOTE — PROGRESS NOTES
ACUTE OCCUPATIONAL THERAPY GOALS:   (Developed with and agreed upon by patient and/or caregiver.)  1. Pt will complete LB ADL set up only with AE as needed. 2. Pt will complete toileting supervision only with AE as needed. GOAL MET 12/12/2022    3. Pt will complete UB ADL set up only. 4. Pt will tolerate 25 minutes of OT treatment requiring 1-2 breaks as needed. 5. Pt will complete grooming tasks while standing at sink supervision. GOAL MET 12/12/2022   6. Pt will complete functional mobility supervision no AD. GOAL MET 12/12/2022   7. Pt will tolerate BUE exercises to increase strength for safe, functional transfers, ADL participation. 8. Pt will demonstrate proper use of energy conservation techniques during therapeutic activity and/or exercise. OCCUPATIONAL THERAPY: Daily Note PM   OT Visit Days: 2   Time In/Out  OT Charge Capture  Rehab Caseload Tracker  OT Orders    Kathy Swann is a 80 y.o. female   PRIMARY DIAGNOSIS: Severe sepsis ([address]  Peritoneal free air [K66.8]  Severe sepsis ([address] [A41.9, R65.20]  Pneumonia of right upper lobe due to infectious organism [J18.9]  Chest pain, unspecified type [R07.9]  Sepsis, due to unspecified organism, unspecified whether acute organ dysfunction present ([address] [A41.9]       Inpatient: Payor: Jacque Merrill / Plan: [employer] / Product Type: *No Product type* /     ASSESSMENT:     REHAB RECOMMENDATIONS: CURRENT LEVEL OF FUNCTION:  (Most Recently Demonstrated)   Recommendation to date pending progress:  Setting:  No further skilled therapy after discharge from hospital    Equipment:    To Be Determined Bathing:  Not Tested  Dressing:  Not Tested  Feeding/Grooming:  Supervision/Setup  Toileting:  Supervision/Setup  Functional Mobility:  Supervision/Setup     ASSESSMENT:  Ms. Montes Neighbor was supine in bed upon arrival. Pt completed bed mobility and functional mobility with supervision. Pt completed toileting and grooming with supervision.  Good progress made. Continue POC. SUBJECTIVE:     Ms. Crystal Alfaro states, \"I do not need any help, see I can do it all. \"     Social/Functional Lives With: Spouse  Type of Home: House  Home Layout: One level  Home Access: Ramped entrance  ADL Assistance: [address]: Independent  Ambulation Assistance: Independent    OBJECTIVE:     Demarco Lacey / Aundrea Montoya / Mary Callas: IV    RESTRICTIONS/PRECAUTIONS:  Restrictions/Precautions  Restrictions/Precautions: Fall Risk        PAIN: VITALS / O2:   Pre Treatment: 0           Post Treatment: 0 Vitals          Oxygen        MOBILITY: I Mod I S SBA CGA Min Mod Max Total  NT x2 Comments:   Bed Mobility    Rolling [] [] [] [] [] [] [] [] [] [] []    Supine to Sit [] [] [x] [] [] [] [] [] [] [] []    Scooting [] [] [] [] [] [] [] [] [] [] []    Sit to Supine [] [] [x] [] [] [] [] [] [] [] []    Transfers    Sit to Stand [] [] [x] [] [] [] [] [] [] [] []    Bed to Chair [] [] [] [] [] [] [] [] [] [] []    Stand to Sit [] [] [x] [] [] [] [] [] [] [] []    Tub/Shower [] [] [] [] [] [] [] [] [] [] []     Toilet [] [] [x] [] [] [] [] [] [] [] []      [] [] [] [] [] [] [] [] [] [] []    I=Independent, Mod I=Modified Independent, S=Supervision/Setup, SBA=Standby Assistance, CGA=Contact Guard Assistance, Min=Minimal Assistance, Mod=Moderate Assistance, Max=Maximal Assistance, Total=Total Assistance, NT=Not Tested    ACTIVITIES OF DAILY LIVING: I Mod I S SBA CGA Min Mod Max Total NT Comments   BASIC ADLs:              Upper Body   Bathing [] [] [] [] [] [] [] [] [] []    Lower Body Bathing [] [] [] [] [] [] [] [] [] []    Toileting [] [] [x] [] [] [] [] [] [] []    Upper Body Dressing [] [] [] [] [] [] [] [] [] []    Lower Body Dressing [] [] [] [] [] [] [] [] [] []    Feeding [] [] [] [] [] [] [] [] [] []    Grooming [] [] [x] [] [] [] [] [] [] []    Personal Device Care [] [] [] [] [] [] [] [] [] []    Functional Mobility [] [] [x] [] [] [] [] [] [] []    I=Independent, Mod I=Modified Independent, S=Supervision/Setup, SBA=Standby Assistance, CGA=Contact Guard Assistance, Min=Minimal Assistance, Mod=Moderate Assistance, Max=Maximal Assistance, Total=Total Assistance, NT=Not Tested    BALANCE: Good Fair+ Fair Fair- Poor NT Comments   Sitting Static [x] [] [] [] [] []    Sitting Dynamic [] [] [] [] [] []              Standing Static [x] [] [] [] [] []    Standing Dynamic [] [] [] [] [] []        PLAN:     FREQUENCY/DURATION   OT Plan of Care: 3 times/week for duration of hospital stay or until stated goals are met, whichever comes first.    TREATMENT:     TREATMENT:   Self Care (15 minutes): Patient participated in toileting and grooming ADLs in unsupported sitting and standing with no manual cueing to increase activity tolerance. Patient also participated in energy conservation training to increase activity tolerance.      TREATMENT GRID:  N/A    AFTER TREATMENT PRECAUTIONS: Bed, Call light within reach, Needs within reach, RN notified, and Side rails x2    INTERDISCIPLINARY COLLABORATION:  RN/ PCT and OT/ SALAZAR    EDUCATION:       TOTAL TREATMENT DURATION AND TIME:  Time In: 1450  Time Out: 364 [facility]  Minutes: 124 ANABELA Sanchez

## 2022-12-12 NOTE — PROGRESS NOTES
Oxygen Qualifier       Room air: SpO2 with O2 and liter flow   Resting SpO2  88%  95% on 2L   Ambulating SpO2   90 % on 2L       Completed by:    Toñito Lentz RRT

## 2022-12-12 NOTE — PROGRESS NOTES
Hospitalist History and Physical   Admit Date:  2022  3:15 PM   Name:  Jamel Kaur   Age:  80 y.o. Sex:  female  :  1940   MRN:  [id number]   Room:      Presenting Complaint: Chest Pain     Reason(s) for Admission: Peritoneal free air [K66.8]  Severe sepsis ([facility] [A41.9, R65.20]  Pneumonia of right upper lobe due to infectious organism [J18.9]  Chest pain, unspecified type [R07.9]  Sepsis, due to unspecified organism, unspecified whether acute organ dysfunction present ([address] [A41.9]     History of Present Illness:   Jamel Kaur is a 80 y.o. female with medical history of Afib s/p PPM (not on AC) presents with sudden onset worsening shortness of breath and fevers that started earlier today. She says \"I have had pneumonia in the past a few times and this feels like it. \" Denies any sick contacts with family or neighbors. Denies chest pain. Says \"I have just been so tired and I could sleep all day. \" Does have some shortness of breath. Denies abdominal pain, nausea/vomiting or diarrhea. Not sure but believes she received two \"pneumonia vaccines in the past\" from her PCP. Only takes OTC medications (vitamin D, biotin). Does not take any prescription medications at all. Of note, incidental finding of possible duodenal perforation seen on CT imaging (was originally ordered to evaluate for PE). Patient does have \"some reflux whenever I eat something acidic\" but denies nausea/vomiting or diarrhea. She has has not had any problems with eating or drinking and denies abdominal pain. ED Course: Sodium of 131. LA of 2.1. WBC count of 24k. Blood cultures collected x2. CT chest negative for PE. Initial Ct chest also suggested duodenal perforation and Flagyl added to antibiotic regimen; however, dedicated CT abd/pelvis ordered to evaluate for perforation and is negative for any intra-abdominal pathology. Given broad spectrum antibiotics and fluids. Hospitalist consulted for admission. 12/12/2022  Patient seen and evaluated. States she is feeling better and like a new woman and she is ready to go home  Afebrile overnight  Dyspnea and wheezing noted yesterday-resolved today  Denies worsening shortness of breath nausea vomiting or chills        Addendum to the assessment and plan:  BNP elevated greater than 16,000  We will get a 2D echo  Discharge held    Addendum to the assessment and plan:  Room air sats of 85%  Requiring oxygen for ambulation  Will give another dose of Lasix  And check a BNP      Assessment & Plan:     Principal Problem:    Severe sepsis ([facility]  - Due to RLL pneumonia  - follow blood cultures  - stop Flagyl  - CTX empirically   - azithromycin for atypical coverage  - Legionella and pneumococcus urine antigens ordered  - IVF resuscitation    12/12/2022  Continue antibiotics  cultures no growth to date    # Acute hypoxic respiratory failure  - CT chest negative for PE  - likely due to above  - management as above  - jet nebs as needed  - no smoking history and no history of pulmonary issues  - wean supplemental oxygen as tolerated  - goal SpO2>90%    12/12/2022  Dyspnea is improved today-she is on 3 L  We will check her sats on room air and get a walk test with further recommendations based on those results      # Hyponatremia  - likely related to above and poor po intake  - fluids as above  - monitor with AM labs    12/12/2022  Resolved    Hypomagnesemia  12/12/2022  Resolved    Hypokalemia  Replace    Wheezing and dyspnea  Continue nebs   Currently resolved after coughing  Follow-up sats on room air as above      Fever  No further episodes of fever      Anticipated discharge needs:   None  Would like to speak to case management    Diet: ADULT DIET;  Regular  VTE ppx: heparin   Code status: Full Code    Hospital Problems:  Principal Problem:    Severe sepsis ([facility]  Active Problems:    Acute respiratory failure with hypoxemia (HCC)    Hyponatremia  Resolved Problems:    * No resolved hospital problems. *       Objective:   Patient Vitals for the past 24 hrs:   Temp Pulse Resp BP SpO2   12/12/22 1411 -- -- -- -- 96 %   12/12/22 1100 97.9 °F (36.6 °C) (!) 103 20 126/65 95 %   12/12/22 0949 -- -- -- -- 95 %   12/12/22 0940 -- -- -- -- (!) 85 %   12/12/22 0734 97.9 °F (36.6 °C) 99 22 (!) 143/71 95 %   12/12/22 0718 -- 85 20 -- 95 %   12/12/22 0354 98.2 °F (36.8 °C) 90 17 (!) 144/64 98 %   12/11/22 2320 98 °F (36.7 °C) (!) 112 19 132/68 97 %   12/11/22 2025 98.2 °F (36.8 °C) (!) 120 16 135/66 95 %   12/11/22 1913 -- (!) 114 22 -- 97 %   12/11/22 1533 98.2 °F (36.8 °C) (!) 110 22 136/69 97 %   12/11/22 1507 -- (!) 110 20 -- 94 %         Oxygen Therapy  SpO2: 96 %  Pulse via Oximetry: 108 beats per minute  Pulse Oximeter Device Mode: Intermittent  O2 Device: Nasal cannula  O2 Flow Rate (L/min): 2 L/min  O2 Delivery Method: Nasal cannula    Estimated body mass index is 19.19 kg/m² as calculated from the following:    Height as of this encounter: 4' 11\" (1.499 m). Weight as of this encounter: 95 lb (43.1 kg). Intake/Output Summary (Last 24 hours) at 12/12/2022 1415  Last data filed at 12/12/2022 0558  Gross per 24 hour   Intake 2507 ml   Output 850 ml   Net 1657 ml           Physical Exam:    Blood pressure 126/65, pulse (!) 103, temperature 97.9 °F (36.6 °C), temperature source Oral, resp. rate 20, height 4' 11\" (1.499 m), weight 95 lb (43.1 kg), SpO2 96 %. General:    Well nourished. Very pleasant  Head:  Normocephalic, atraumatic  Eyes:  Sclerae appear normal.  Pupils equally round. ENT:  Nares appear normal, no drainage. Moist oral mucosa  Neck:  No restricted ROM. Trachea midline   CV:   RRR. No jugular venous distension. Lungs:   Entry equal bilaterally noted dyspnea bilateral wheezing, no rhonchi, or rales. Symmetric expansion. Abdomen: Bowel sounds present. Soft, nontender, nondistended. Extremities: No cyanosis or clubbing.   No edema  Skin:     No rashes and normal coloration. Warm and dry. Neuro:  CN II-XII grossly intact. Sensation intact. A&Ox3  Psych:  Normal mood and affect. I have personally reviewed labs and tests showing:  Recent Labs:  Recent Results (from the past 24 hour(s))   CBC with Auto Differential    Collection Time: 12/12/22  7:59 AM   Result Value Ref Range    WBC 6.8 4.3 - 11.1 K/uL    RBC 3.28 (L) 4.05 - 5.2 M/uL    Hemoglobin 9.4 (L) 11.7 - 15.4 g/dL    Hematocrit 29.4 (L) 35.8 - 46.3 %    MCV 89.6 82 - 102 FL    MCH 28.7 26.1 - 32.9 PG    MCHC 32.0 31.4 - 35.0 g/dL    RDW 15.9 (H) 11.9 - 14.6 %    Platelets 201 865 - 124 K/uL    MPV 10.6 9.4 - 12.3 FL    nRBC 0.00 0.0 - 0.2 K/uL    Differential Type AUTOMATED      Seg Neutrophils 62 43 - 78 %    Lymphocytes 21 13 - 44 %    Monocytes 12 4.0 - 12.0 %    Eosinophils % 4 0.5 - 7.8 %    Basophils 0 0.0 - 2.0 %    Immature Granulocytes 1 0.0 - 5.0 %    Segs Absolute 4.2 1.7 - 8.2 K/UL    Absolute Lymph # 1.4 0.5 - 4.6 K/UL    Absolute Mono # 0.8 0.1 - 1.3 K/UL    Absolute Eos # 0.3 0.0 - 0.8 K/UL    Basophils Absolute 0.0 0.0 - 0.2 K/UL    Absolute Immature Granulocyte 0.1 0.0 - 0.5 K/UL   Basic Metabolic Panel w/ Reflex to MG    Collection Time: 12/12/22  7:59 AM   Result Value Ref Range    Sodium 139 133 - 143 mmol/L    Potassium 3.8 3.5 - 5.1 mmol/L    Chloride 107 101 - 110 mmol/L    CO2 26 21 - 32 mmol/L    Anion Gap 6 2 - 11 mmol/L    Glucose 106 (H) 65 - 100 mg/dL    BUN 6 (L) 8 - 23 MG/DL    Creatinine 0.40 (L) 0.6 - 1.0 MG/DL    Est, Glom Filt Rate >60 >60 ml/min/1.73m2    Calcium 8.0 (L) 8.3 - 10.4 MG/DL   Brain Natriuretic Peptide    Collection Time: 12/12/22 12:53 PM   Result Value Ref Range    NT Pro-BNP 11,135 (H) <450 PG/ML       I have personally reviewed imaging studies showing:  CT ABDOMEN PELVIS W IV CONTRAST Additional Contrast? Oral    Result Date: 12/7/2022  Exam: CT ABDOMEN PELVIS W IV CONTRAST on 12/7/2022 7:03 PM Clinical History:  The Female patient is 80years old presenting for concern for free air in abdomen Technique: Thin slice axial images were obtained through the abdomen and pelvis with intravenous and with oral contrast.  Coronal reformatted images were also provided for review. A total of 100 ml of Iopamidol (ISOVUE-370) 76 % contrast was administered intravenously. All CT scans at this facility are performed using dose reduction/dose modulation techniques, as appropriate the performed exam, including the following: Automated Exposure Control; Adjustment of the mA and/or kV according to patient size (this includes techniques or standardized protocols for targeted exams where dose is matched to indication/reason for exam); and Use of Iterative Reconstruction Technique. Radiation Exposure Indices: Reference Air Kerma (Josias Santillan) = 466 mGy-cm COMPARISON:  Chest CT 12/7/2022. FINDINGS:  Abdomen: Liver: Normal size, contour, density and enhancement without focal mass. Biliary: Unremarkable gallbladder. No evidence of intra or extrahepatic biliary dilatation. Pancreas: Normal in size and contour without focal lesion. Spleen: Normal in size and contour without focal lesion. Adrenal glands: Normal in size without focal lesion. Kidneys: Symmetric without evidence of hydronephrosis or nephrolithiasis. Normal enhancement throughout all visualized phases of contrast excretion. 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 Retroperitoneum/vasculature: No evidence of significant adenopathy. Mild aortoiliac atherosclerotic disease without evidence of aneurysmal dilatation. Abdominal soft tissues: Unremarkable. Osseous structures: Age-indeterminate moderate L2 compression fracture deformity. Pelvis: Unremarkable bladder. No significant adenopathy or free fluid. Elizabeth Quiver Hysterectomy. 1. No acute intra-abdominal or pelvic abnormality.  2. 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 3. Age-indeterminate moderate L2 compression fracture deformity. CPT code(s): L1775182, [id number]    CT CHEST PULMONARY EMBOLISM W CONTRAST    Result Date: 12/7/2022  Exam: CT CHEST PULMONARY EMBOLISM W CONTRAST on 12/7/2022 5:28 PM Clinical History: The Female patient is 80years old  presenting for right sided chest pain, tachycardia. Technique: Thin section axial images were obtained through the chest with intravenous contrast.  Coronal reformatted images were also provided for review. A total of 100 ml of Iopamidol (ISOVUE-370) 76 % contrast was administered intravenously. All CT scans at this facility are performed using dose reduction/dose modulation techniques, as appropriate the performed exam, including the following: Automated Exposure Control; Adjustment of the mA and/or kV according to patient size (this includes techniques or standardized protocols for targeted exams where dose is matched to indication/reason for exam); and Use of Iterative Reconstruction Technique. Radiation Exposure Indices: Reference Air Kerma (Ka,r) = 176 mGy-cm Comparison:  None. FINDINGS: Images through the lungs demonstrate consolidative changes in the posterior right upper lobe. There are bronchiectatic changes at the lung bases. Normal vascular enhancement is demonstrated. No evidence of aortic dissection or aneurysmal dilatation is seen. There is no evidence of pulmonary embolic disease. There is no significant hilar or mediastinal lymphadenopathy. Images chest wall structures are unremarkable in appearance. Images through the upper abdomen demonstrate a small focus of air in the berenice hepatis concerning for possible duodenal ulcer perforation. There is a large partially imaged cyst in the left mid kidney measuring at least 6 cm.  There are no acute osseous abnormalities. 1. No evidence of pulmonary embolic disease. 2. Posterior right upper lobe pneumonia. 3. Bibasilar bronchiectatic changes. 4. Small focus of free air is suggested in the berenice hepatis concerning for duodenal ulcer perforation. Clinical correlation is recommended with additional imaging as indicated. The findings contained in this report were communicated to the emergency room physician  by  Dr Arleen Donahue on 12/7/2022 5:39 PM. CPT code(s) 87220       Echocardiogram:  No results found for this or any previous visit.         Orders Placed This Encounter   Medications    0.9 % sodium chloride bolus    sodium chloride flush 0.9 % injection 10 mL    iopamidol (ISOVUE-370) 76 % injection 100 mL    cefTRIAXone (ROCEPHIN) 1,000 mg in sodium chloride 0.9 % 50 mL IVPB mini-bag     Order Specific Question:   Antimicrobial Indications     Answer:   Sepsis of Unknown Etiology    azithromycin (ZITHROMAX) 500 mg in sodium chloride 0.9 % 250 mL IVPB ([id number])     Order Specific Question:   Antimicrobial Indications     Answer:   Sepsis of Unknown Etiology    acetaminophen (TYLENOL) tablet 650 mg    diatrizoate meglumine-sodium (GASTROGRAFIN) 66-10 % solution 15 mL    metronidazole (FLAGYL) 500 mg in 0.9% NaCl 100 mL IVPB premix     Order Specific Question:   Antimicrobial Indications     Answer:   Intra-Abdominal Infection    sodium chloride flush 0.9 % injection 10 mL    0.9 % sodium chloride bolus    iopamidol (ISOVUE-370) 76 % injection 80 mL    0.9 % sodium chloride bolus    0.9 % sodium chloride bolus    sodium chloride flush 0.9 % injection 5-40 mL    sodium chloride flush 0.9 % injection 5-40 mL    0.9 % sodium chloride infusion    heparin (porcine) injection 5,000 Units    OR Linked Order Group     ondansetron (ZOFRAN-ODT) disintegrating tablet 4 mg     ondansetron (ZOFRAN) injection 4 mg    polyethylene glycol (GLYCOLAX) packet 17 g    OR Linked Order Group     acetaminophen (TYLENOL) tablet 650 mg acetaminophen (TYLENOL) suppository 650 mg    cefTRIAXone (ROCEPHIN) 1,000 mg in sodium chloride 0.9 % 50 mL IVPB mini-bag     Order Specific Question:   Antimicrobial Indications     Answer:   Pneumonia (CAP)     Order Specific Question:   CAP duration of therapy     Answer:   5 days    azithromycin (ZITHROMAX) tablet 500 mg     Order Specific Question:   Antimicrobial Indications     Answer:   Pneumonia (CAP)     Order Specific Question:   CAP duration of therapy     Answer:   5 days    albuterol (PROVENTIL) nebulizer solution 2.5 mg     Order Specific Question:   Initiate RT Bronchodilator Protocol     Answer:   Yes - Inpatient Protocol    tuberculin injection 5 Units    DISCONTD: lactated ringers infusion    pantoprazole (PROTONIX) tablet 40 mg    calcium carbonate (TUMS) chewable tablet 500 mg    traZODone (DESYREL) tablet 50 mg    oxyCODONE (ROXICODONE) immediate release tablet 5 mg    hyoscyamine (LEVSIN/SL) sublingual tablet 125 mcg    potassium chloride (KLOR-CON M) extended release tablet 40 mEq    melatonin tablet 4.5 mg    lactobacillus acidophilus (FLORANEX) 4 tablet    DISCONTD: ipratropium-albuterol (DUONEB) nebulizer solution 1 ampule     Order Specific Question:   Initiate RT Bronchodilator Protocol     Answer:   Yes - Inpatient Protocol    budesonide (PULMICORT) nebulizer suspension 500 mcg    furosemide (LASIX) injection 20 mg    ipratropium-albuterol (DUONEB) nebulizer solution 1 ampule     Order Specific Question:   Initiate RT Bronchodilator Protocol     Answer:   Yes - Inpatient Protocol    furosemide (LASIX) injection 20 mg         Signed:  Osito Dickerson MD    Part of this note may have been written by using a voice dictation software. The note has been proof read but may still contain some grammatical/other typographical errors.

## 2022-12-13 ENCOUNTER — APPOINTMENT (OUTPATIENT)
Dept: NON INVASIVE DIAGNOSTICS | Age: 82
DRG: 871 | End: 2022-12-13
Payer: MEDICARE

## 2022-12-13 LAB
ECHO AO ASC DIAM: 2.8 CM
ECHO AO ASCENDING AORTA INDEX: 2.09 CM/M2
ECHO AO ROOT DIAM: 2.6 CM
ECHO AO ROOT INDEX: 1.94 CM/M2
ECHO AV AREA PEAK VELOCITY: 1.5 CM2
ECHO AV AREA VTI: 1.7 CM2
ECHO AV AREA/BSA PEAK VELOCITY: 1.1 CM2/M2
ECHO AV AREA/BSA VTI: 1.3 CM2/M2
ECHO AV MEAN GRADIENT: 5 MMHG
ECHO AV MEAN VELOCITY: 1 M/S
ECHO AV PEAK GRADIENT: 8 MMHG
ECHO AV PEAK VELOCITY: 1.4 M/S
ECHO AV VELOCITY RATIO: 0.71
ECHO AV VTI: 25.5 CM
ECHO AV VTI: 25.5 CM
ECHO BSA: 1.34 M2
ECHO EST RA PRESSURE: 3 MMHG
ECHO IVC PROX: 1.7 CM
ECHO LA AREA 2C: 20.2 CM2
ECHO LA AREA 4C: 21.7 CM2
ECHO LA DIAMETER INDEX: 2.91 CM/M2
ECHO LA DIAMETER: 3.9 CM
ECHO LA MAJOR AXIS: 6.1 CM
ECHO LA MINOR AXIS: 5.6 CM
ECHO LA TO AORTIC ROOT RATIO: 1.5
ECHO LA VOL 2C: 60 ML (ref 22–52)
ECHO LA VOL 4C: 64 ML (ref 22–52)
ECHO LA VOL BP: 65 ML (ref 22–52)
ECHO LA VOL/BSA BIPLANE: 49 ML/M2 (ref 16–34)
ECHO LA VOLUME INDEX A2C: 45 ML/M2 (ref 16–34)
ECHO LA VOLUME INDEX A4C: 48 ML/M2 (ref 16–34)
ECHO LV E' LATERAL VELOCITY: 11 CM/S
ECHO LV E' SEPTAL VELOCITY: 6 CM/S
ECHO LV EDV A2C: 65 ML
ECHO LV EDV A4C: 54 ML
ECHO LV EDV INDEX A4C: 40 ML/M2
ECHO LV EDV NDEX A2C: 49 ML/M2
ECHO LV EJECTION FRACTION A2C: 53 %
ECHO LV EJECTION FRACTION A4C: 48 %
ECHO LV EJECTION FRACTION BIPLANE: 51 % (ref 55–100)
ECHO LV ESV A2C: 31 ML
ECHO LV ESV A4C: 28 ML
ECHO LV ESV INDEX A2C: 23 ML/M2
ECHO LV ESV INDEX A4C: 21 ML/M2
ECHO LV FRACTIONAL SHORTENING: 38 % (ref 28–44)
ECHO LV INTERNAL DIMENSION DIASTOLE INDEX: 3.36 CM/M2
ECHO LV INTERNAL DIMENSION DIASTOLIC: 4.5 CM (ref 3.9–5.3)
ECHO LV INTERNAL DIMENSION SYSTOLIC INDEX: 2.09 CM/M2
ECHO LV INTERNAL DIMENSION SYSTOLIC: 2.8 CM
ECHO LV IVSD: 0.7 CM (ref 0.6–0.9)
ECHO LV MASS 2D: 104.5 G (ref 67–162)
ECHO LV MASS INDEX 2D: 78 G/M2 (ref 43–95)
ECHO LV POSTERIOR WALL DIASTOLIC: 0.8 CM (ref 0.6–0.9)
ECHO LV RELATIVE WALL THICKNESS RATIO: 0.36
ECHO LVOT AREA: 2.3 CM2
ECHO LVOT DIAM: 1.7 CM
ECHO LVOT MEAN GRADIENT: 2 MMHG
ECHO LVOT PEAK GRADIENT: 4 MMHG
ECHO LVOT PEAK VELOCITY: 1 M/S
ECHO LVOT STROKE VOLUME INDEX: 31.7 ML/M2
ECHO LVOT SV: 42.4 ML
ECHO LVOT VTI: 18.7 CM
ECHO MV A VELOCITY: 0.96 M/S
ECHO MV E DECELERATION TIME (DT): 162 MS
ECHO MV E VELOCITY: 0.98 M/S
ECHO MV E/A RATIO: 1.02
ECHO MV E/E' LATERAL: 8.91
ECHO MV E/E' RATIO (AVERAGED): 12.62
ECHO MV E/E' SEPTAL: 16.33
ECHO MV REGURGITANT PEAK GRADIENT: 67 MMHG
ECHO MV REGURGITANT PEAK VELOCITY: 4.1 M/S
ECHO PULMONARY ARTERY END DIASTOLIC PRESSURE: 6 MMHG
ECHO PV MAX VELOCITY: 1 M/S
ECHO PV PEAK GRADIENT: 4 MMHG
ECHO PV REGURGITANT MAX VELOCITY: 1.3 M/S
ECHO RIGHT VENTRICULAR SYSTOLIC PRESSURE (RVSP): 44 MMHG
ECHO RV BASAL DIMENSION: 4.1 CM
ECHO RV FREE WALL PEAK S': 13 CM/S
ECHO TV REGURGITANT MAX VELOCITY: 3.22 M/S
ECHO TV REGURGITANT PEAK GRADIENT: 41 MMHG
FLUID CULTURE: ABNORMAL
Lab: ABNORMAL
ORGANISM ID: ABNORMAL
S PNEUM AG SPEC QL LA: POSITIVE
SPECIMEN SOURCE: ABNORMAL
SPECIMEN: ABNORMAL

## 2022-12-13 PROCEDURE — 94640 AIRWAY INHALATION TREATMENT: CPT

## 2022-12-13 PROCEDURE — 93306 TTE W/DOPPLER COMPLETE: CPT

## 2022-12-13 PROCEDURE — 94761 N-INVAS EAR/PLS OXIMETRY MLT: CPT

## 2022-12-13 PROCEDURE — 1100000000 HC RM PRIVATE

## 2022-12-13 PROCEDURE — 2580000003 HC RX 258: Performed by: INTERNAL MEDICINE

## 2022-12-13 PROCEDURE — 93306 TTE W/DOPPLER COMPLETE: CPT | Performed by: INTERNAL MEDICINE

## 2022-12-13 PROCEDURE — 6370000000 HC RX 637 (ALT 250 FOR IP): Performed by: INTERNAL MEDICINE

## 2022-12-13 PROCEDURE — 6360000002 HC RX W HCPCS: Performed by: INTERNAL MEDICINE

## 2022-12-13 PROCEDURE — 2580000003 HC RX 258: Performed by: STUDENT IN AN ORGANIZED HEALTH CARE EDUCATION/TRAINING PROGRAM

## 2022-12-13 PROCEDURE — 2700000000 HC OXYGEN THERAPY PER DAY

## 2022-12-13 PROCEDURE — 6360000002 HC RX W HCPCS: Performed by: STUDENT IN AN ORGANIZED HEALTH CARE EDUCATION/TRAINING PROGRAM

## 2022-12-13 RX ORDER — FUROSEMIDE 10 MG/ML
40 INJECTION INTRAMUSCULAR; INTRAVENOUS 2 TIMES DAILY
Status: DISCONTINUED | OUTPATIENT
Start: 2022-12-13 | End: 2022-12-14 | Stop reason: HOSPADM

## 2022-12-13 RX ADMIN — SODIUM CHLORIDE, PRESERVATIVE FREE 10 ML: 5 INJECTION INTRAVENOUS at 09:00

## 2022-12-13 RX ADMIN — IPRATROPIUM BROMIDE AND ALBUTEROL SULFATE 1 AMPULE: 2.5; .5 SOLUTION RESPIRATORY (INHALATION) at 07:15

## 2022-12-13 RX ADMIN — SODIUM CHLORIDE, PRESERVATIVE FREE 10 ML: 5 INJECTION INTRAVENOUS at 21:48

## 2022-12-13 RX ADMIN — BUDESONIDE 500 MCG: 0.5 INHALANT RESPIRATORY (INHALATION) at 07:15

## 2022-12-13 RX ADMIN — IPRATROPIUM BROMIDE AND ALBUTEROL SULFATE 1 AMPULE: 2.5; .5 SOLUTION RESPIRATORY (INHALATION) at 14:15

## 2022-12-13 RX ADMIN — LACTOBACILLUS TAB 4 TABLET: TAB at 21:47

## 2022-12-13 RX ADMIN — IPRATROPIUM BROMIDE AND ALBUTEROL SULFATE 1 AMPULE: 2.5; .5 SOLUTION RESPIRATORY (INHALATION) at 20:28

## 2022-12-13 RX ADMIN — FUROSEMIDE 40 MG: 10 INJECTION, SOLUTION INTRAMUSCULAR; INTRAVENOUS at 11:47

## 2022-12-13 RX ADMIN — LACTOBACILLUS TAB 4 TABLET: TAB at 15:06

## 2022-12-13 RX ADMIN — LACTOBACILLUS TAB 4 TABLET: TAB at 09:00

## 2022-12-13 RX ADMIN — HEPARIN SODIUM 5000 UNITS: 5000 INJECTION INTRAVENOUS; SUBCUTANEOUS at 21:47

## 2022-12-13 RX ADMIN — CEFTRIAXONE 1000 MG: 1 INJECTION, POWDER, FOR SOLUTION INTRAMUSCULAR; INTRAVENOUS at 15:05

## 2022-12-13 RX ADMIN — HEPARIN SODIUM 5000 UNITS: 5000 INJECTION INTRAVENOUS; SUBCUTANEOUS at 09:00

## 2022-12-13 RX ADMIN — FUROSEMIDE 40 MG: 10 INJECTION, SOLUTION INTRAMUSCULAR; INTRAVENOUS at 17:46

## 2022-12-13 RX ADMIN — BUDESONIDE 500 MCG: 0.5 INHALANT RESPIRATORY (INHALATION) at 20:28

## 2022-12-13 ASSESSMENT — PAIN SCALES - GENERAL: PAINLEVEL_OUTOF10: 0

## 2022-12-13 ASSESSMENT — PAIN SCALES - WONG BAKER: WONGBAKER_NUMERICALRESPONSE: 0

## 2022-12-13 NOTE — PROGRESS NOTES
12/13/2022  Patient seen and evaluated. Denies worsening shortness of breath nausea vomiting or chills                Assessment & Plan:   Severe sepsis ([facility] [address] due to RLL pneumonia  Streptococcus antigen positive  -Completed CTX and azithromycin - Legionella negative-  Blood cultures no growth to date    # Acute hypoxic respiratory failure  Patient is on 2 LNC and not on home oxygen  - CT chest negative for PE  - goal SpO2>90% wean oxygen as tolerated  RT assessed and patient is requiring 2 LNC    CHF with Diastolic dysfunction  Echo with normal ejection fraction and diastolic dysfunction, RVSP 44 mmHg  BNP of 11 K  Continue IV Lasix 40 Mg twice daily  Cardiology consulted    # Hyponatremia [place] /Hypokalemia  Resolved      Anticipated discharge needs:   Anticipate hospital stay for 1 to 2 days. PT/OT recommended HHT    Diet: ADULT DIET; Regular  VTE ppx: heparin   Code status: Full Code    Hospital Problems:  Principal Problem:    Severe sepsis ([facility] [address]  Active Problems:    Acute respiratory failure with hypoxemia (HCC)    Hyponatremia  Resolved Problems:    * No resolved hospital problems. *       Objective:   Patient Vitals for the past 24 hrs:   Temp Pulse Resp BP SpO2   12/13/22 1443 97.5 °F (36.4 °C) 96 20 113/71 95 %   12/13/22 1142 97.9 °F (36.6 °C) 90 18 117/65 96 %   12/13/22 0746 97.9 °F (36.6 °C) 87 20 132/65 97 %   12/13/22 0427 97.7 °F (36.5 °C) 85 17 139/68 97 %   12/12/22 2329 98.2 °F (36.8 °C) 92 20 124/68 97 %   12/12/22 2002 98.1 °F (36.7 °C) 99 19 127/77 95 %   12/12/22 1920 -- -- -- -- 97 %       Oxygen Therapy  SpO2: 95 %  Pulse via Oximetry: 108 beats per minute  Pulse Oximeter Device Mode: Intermittent  O2 Device: Nasal cannula  O2 Flow Rate (L/min): 2 L/min  O2 Delivery Method: Nasal cannula    Estimated body mass index is 19.19 kg/m² as calculated from the following:    Height as of this encounter: 4' 11\" (1.499 m).     Weight as of this encounter: 95 lb (43.1 kg).    Intake/Output Summary (Last 24 hours) at 12/13/2022 1635  Last data filed at 12/13/2022 1410  Gross per 24 hour   Intake 480 ml   Output 2250 ml   Net -1770 ml         Physical Exam:    Blood pressure 113/71, pulse 96, temperature 97.5 °F (36.4 °C), temperature source Oral, resp. rate 20, height 4' 11\" (1.499 m), weight 95 lb (43.1 kg), SpO2 95 %. General:    Well nourished. Very pleasant  Head:  Normocephalic, atraumatic  Eyes:  Sclerae appear normal.  Pupils equally round. ENT:  Nares appear normal, no drainage. Moist oral mucosa  Neck:  No restricted ROM. Trachea midline   CV:   RRR. No jugular venous distension. Lungs:   Entry equal bilaterally noted dyspnea bilateral wheezing, no rhonchi, or rales. Symmetric expansion. Abdomen: Bowel sounds present. Soft, nontender, nondistended. Extremities: No cyanosis or clubbing. No edema  Skin:     No rashes and normal coloration. Warm and dry. Neuro:  CN II-XII grossly intact. Sensation intact. A&Ox3  Psych:  Normal mood and affect.       I have personally reviewed labs and tests showing:  Recent Labs:  Recent Results (from the past 24 hour(s))   Transthoracic echocardiogram (TTE) complete with contrast, bubble, strain, and 3D PRN    Collection Time: 12/13/22 11:03 AM   Result Value Ref Range    LV EDV A2C 65 mL    LV EDV A4C 54 mL    LV ESV A2C 31 mL    LV ESV A4C 28 mL    IVSd 0.7 0.6 - 0.9 cm    LVIDd 4.5 3.9 - 5.3 cm    LVIDs 2.8 cm    LVOT Diameter 1.7 cm    LVOT Mean Gradient 2 mmHg    LVOT VTI 18.7 cm    LVOT Peak Velocity 1.0 m/s    LVOT Peak Gradient 4 mmHg    LVPWd 0.8 0.6 - 0.9 cm    LV E' Lateral Velocity 11 cm/s    LV E' Septal Velocity 6 cm/s    LV Ejection Fraction A2C 53 %    LV Ejection Fraction A4C 48 %    EF BP 51 (A) 55 - 100 %    LVOT Area 2.3 cm2    LVOT SV 42.4 ml    LA Minor Axis 5.6 cm    LA Major [place] 6.1 cm    LA Area 2C 20.2 cm2    LA Area 4C 21.7 cm2    LA Volume 2C 60 (A) 22 - 52 mL    LA Volume 4C 64 (A) 22 - 52 mL    LA Volume BP 65 (A) 22 - 52 mL    LA Diameter 3.9 cm    AV Mean Velocity 1.0 m/s    AV Mean Gradient 5 mmHg    AV VTI 25.5 cm    AV VTI 25.5 cm    AV Peak Velocity 1.4 m/s    AV Peak Gradient 8 mmHg    AV Area by VTI 1.7 cm2    AV Area by Peak Velocity 1.5 cm2    Aortic Root 2.6 cm    Ascending Aorta 2.8 cm    IVC Proxmal 1.7 cm    MR Peak Velocity 4.1 m/s    MR Peak Gradient 67 mmHg    MV E Wave Deceleration Time 162.0 ms    MV A Velocity 0.96 m/s    MV E Velocity 0.98 m/s    VT Max Velocity 1.3 m/s    Pulmonary Artery EDP 6 mmHg    PV Max Velocity 1.0 m/s    PV Peak Gradient 4 mmHg    Est. RA Pressure 3 mmHg    RV Basal Dimension 4.1 cm    RV Free Wall Peak S' 13 cm/s    TR Max Velocity 3.22 m/s    TR Peak Gradient 41 mmHg    Body Surface Area 1.34 m2    Fractional Shortening 2D 38 28 - 44 %    LV ESV Index A4C 21 mL/m2    LV EDV Index A4C 40 mL/m2    LV ESV Index A2C 23 mL/m2    LV EDV Index A2C 49 mL/m2    LVIDd Index 3.36 cm/m2    LVIDs Index 2.09 cm/m2    LV RWT Ratio 0.36     LV Mass 2D 104.5 67 - 162 g    LV Mass 2D Index 78.0 43 - 95 g/m2    MV E/A 1.02     E/E' Ratio (Averaged) 12.62     E/E' Lateral 8.91     E/E' Septal 16.33     LA Volume Index BP 49 (A) 16 - 34 ml/m2    LVOT Stroke Volume Index 31.7 mL/m2    LA Volume Index 2C 45 (A) 16 - 34 mL/m2    LA Volume Index 4C 48 (A) 16 - 34 mL/m2    LA Size Index 2.91 cm/m2    LA/AO Root Ratio 1.50     Ao Root Index 1.94 cm/m2    Ascending Aorta Index 2.09 cm/m2    AV Velocity Ratio 0.71     EMANI/BSA VTI 1.3 cm2/m2    EMANI/BSA Peak Velocity 1.1 cm2/m2    RVSP 44 mmHg       I have personally reviewed imaging studies showing:  CT ABDOMEN PELVIS W IV CONTRAST Additional Contrast? Oral    Result Date: 12/7/2022  Exam: CT ABDOMEN PELVIS W IV CONTRAST on 12/7/2022 7:03 PM Clinical History: The Female patient is 80years old  presenting for concern for free air in abdomen Technique:   Thin slice axial images were obtained through the abdomen and pelvis with intravenous and with oral contrast.  Coronal reformatted images were also provided for review. A total of 100 ml of Iopamidol (ISOVUE-370) 76 % contrast was administered intravenously. All CT scans at this facility are performed using dose reduction/dose modulation techniques, as appropriate the performed exam, including the following: Automated Exposure Control; Adjustment of the mA and/or kV according to patient size (this includes techniques or standardized protocols for targeted exams where dose is matched to indication/reason for exam); and Use of Iterative Reconstruction Technique. Radiation Exposure Indices: Reference Air Kerma ([place]) = 466 mGy-cm COMPARISON:  Chest CT 12/7/2022. FINDINGS:  Abdomen: Liver: Normal size, contour, density and enhancement without focal mass. Biliary: Unremarkable gallbladder. No evidence of intra or extrahepatic biliary dilatation. Pancreas: Normal in size and contour without focal lesion. Spleen: Normal in size and contour without focal lesion. Adrenal glands: Normal in size without focal lesion. Kidneys: Symmetric without evidence of hydronephrosis or nephrolithiasis. Normal enhancement throughout all visualized phases of contrast excretion. 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 Retroperitoneum/vasculature: No evidence of significant adenopathy. Mild aortoiliac atherosclerotic disease without evidence of aneurysmal dilatation. Abdominal soft tissues: Unremarkable. Osseous structures: Age-indeterminate moderate L2 compression fracture deformity. Pelvis: Unremarkable bladder. No significant adenopathy or free fluid. Valri Dykes Hysterectomy. 1. No acute intra-abdominal or pelvic abnormality.  2. 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 3. Age-indeterminate moderate L2 compression fracture deformity. CPT code(s): U200104, [id number]    CT CHEST PULMONARY EMBOLISM W CONTRAST    Result Date: 12/7/2022  Exam: CT CHEST PULMONARY EMBOLISM W CONTRAST on 12/7/2022 5:28 PM Clinical History: The Female patient is 80years old  presenting for right sided chest pain, tachycardia. Technique: Thin section axial images were obtained through the chest with intravenous contrast.  Coronal reformatted images were also provided for review. A total of 100 ml of Iopamidol (ISOVUE-370) 76 % contrast was administered intravenously. All CT scans at this facility are performed using dose reduction/dose modulation techniques, as appropriate the performed exam, including the following: Automated Exposure Control; Adjustment of the mA and/or kV according to patient size (this includes techniques or standardized protocols for targeted exams where dose is matched to indication/reason for exam); and Use of Iterative Reconstruction Technique. Radiation Exposure Indices: Reference Air Kerma (Ka,r) = 176 mGy-cm Comparison:  None. FINDINGS: Images through the lungs demonstrate consolidative changes in the posterior right upper lobe. There are bronchiectatic changes at the lung bases. Normal vascular enhancement is demonstrated. No evidence of aortic dissection or aneurysmal dilatation is seen. There is no evidence of pulmonary embolic disease. There is no significant hilar or mediastinal lymphadenopathy. Images chest wall structures are unremarkable in appearance. Images through the upper abdomen demonstrate a small focus of air in the berenice hepatis concerning for possible duodenal ulcer perforation. There is a large partially imaged cyst in the left mid kidney measuring at least 6 cm. There are no acute osseous abnormalities. 1. No evidence of pulmonary embolic disease. 2. Posterior right upper lobe pneumonia.  3. Bibasilar bronchiectatic changes. 4. Small focus of free air is suggested in the berenice hepatis concerning for duodenal ulcer perforation. Clinical correlation is recommended with additional imaging as indicated. The findings contained in this report were communicated to the emergency room physician  by  Dr Alexander Galo on 12/7/2022 5:39 PM. CPT code(s) 10009       Echocardiogram:  No results found for this or any previous visit.         Orders Placed This Encounter   Medications    0.9 % sodium chloride bolus    sodium chloride flush 0.9 % injection 10 mL    iopamidol (ISOVUE-370) 76 % injection 100 mL    cefTRIAXone (ROCEPHIN) 1,000 mg in sodium chloride 0.9 % 50 mL IVPB mini-bag     Order Specific Question:   Antimicrobial Indications     Answer:   Sepsis of Unknown Etiology    azithromycin (ZITHROMAX) 500 mg in sodium chloride 0.9 % 250 mL IVPB ([id number])     Order Specific Question:   Antimicrobial Indications     Answer:   Sepsis of Unknown Etiology    acetaminophen (TYLENOL) tablet 650 mg    diatrizoate meglumine-sodium (GASTROGRAFIN) 66-10 % solution 15 mL    metronidazole (FLAGYL) 500 mg in 0.9% NaCl 100 mL IVPB premix     Order Specific Question:   Antimicrobial Indications     Answer:   Intra-Abdominal Infection    sodium chloride flush 0.9 % injection 10 mL    0.9 % sodium chloride bolus    iopamidol (ISOVUE-370) 76 % injection 80 mL    0.9 % sodium chloride bolus    0.9 % sodium chloride bolus    sodium chloride flush 0.9 % injection 5-40 mL    sodium chloride flush 0.9 % injection 5-40 mL    0.9 % sodium chloride infusion    heparin (porcine) injection 5,000 Units    OR Linked Order Group     ondansetron (ZOFRAN-ODT) disintegrating tablet 4 mg     ondansetron (ZOFRAN) injection 4 mg    polyethylene glycol (GLYCOLAX) packet 17 g    OR Linked Order Group     acetaminophen (TYLENOL) tablet 650 mg     acetaminophen (TYLENOL) suppository 650 mg    cefTRIAXone (ROCEPHIN) 1,000 mg in sodium chloride 0.9 % 50 mL IVPB mini-bag Order Specific Question:   Antimicrobial Indications     Answer:   Pneumonia (CAP)     Order Specific Question:   CAP duration of therapy     Answer:   5 days    azithromycin (ZITHROMAX) tablet 500 mg     Order Specific Question:   Antimicrobial Indications     Answer:   Pneumonia (CAP)     Order Specific Question:   CAP duration of therapy     Answer:   5 days    albuterol (PROVENTIL) nebulizer solution 2.5 mg     Order Specific Question:   Initiate RT Bronchodilator Protocol     Answer:   Yes - Inpatient Protocol    tuberculin injection 5 Units    DISCONTD: lactated ringers infusion    pantoprazole (PROTONIX) tablet 40 mg    calcium carbonate (TUMS) chewable tablet 500 mg    traZODone (DESYREL) tablet 50 mg    oxyCODONE (ROXICODONE) immediate release tablet 5 mg    hyoscyamine (LEVSIN/SL) sublingual tablet 125 mcg    potassium chloride (KLOR-CON M) extended release tablet 40 mEq    melatonin tablet 4.5 mg    lactobacillus acidophilus (FLORANEX) 4 tablet    DISCONTD: ipratropium-albuterol (DUONEB) nebulizer solution 1 ampule     Order Specific Question:   Initiate RT Bronchodilator Protocol     Answer:   Yes - Inpatient Protocol    budesonide (PULMICORT) nebulizer suspension 500 mcg    furosemide (LASIX) injection 20 mg    ipratropium-albuterol (DUONEB) nebulizer solution 1 ampule     Order Specific Question:   Initiate RT Bronchodilator Protocol     Answer:   Yes - Inpatient Protocol    furosemide (LASIX) injection 20 mg    furosemide (LASIX) injection 40 mg    cefTRIAXone (ROCEPHIN) 1,000 mg in sodium chloride 0.9 % 50 mL IVPB mini-bag     Order Specific Question:   Antimicrobial Indications     Answer:   Urinary Tract Infection     Order Specific Question:   UTI duration of therapy     Answer:   3 days         Signed:  Manjeet Pozo MD    Part of this note may have been written by using a voice dictation software. The note has been proof read but may still contain some grammatical/other typographical errors.

## 2022-12-13 NOTE — PROGRESS NOTES
.END OF SHIFT NOTE:    INTAKE/OUTPUT  12/11 0701 - 12/12 0700  In: 2507 [P.O.:240; I.V.:2267]  Out: 1150 [Urine:1150]  Voiding: Yes  Catheter: No  Drain:              Flatus: Patient does have flatus present. Stool:  occurrences. Characteristics:  Stool Appearance: Soft  Stool Color: Brown  Stool Amount: Small  Stool Assessment  Incontinence: Yes  Stool Appearance: Soft  Stool Color: Brown  Stool Amount: Small  Stool Source: Rectum  Last BM (including prior to admit): 12/11/22    Emesis:  occurrences. Characteristics:        VITAL SIGNS  Patient Vitals for the past 12 hrs:   Temp Pulse Resp BP SpO2   12/12/22 1920 -- -- -- -- 97 %   12/12/22 1601 98.7 °F (37.1 °C) 94 20 127/60 96 %   12/12/22 1411 -- -- -- -- 96 %   12/12/22 1100 97.9 °F (36.6 °C) (!) 103 20 126/65 95 %   12/12/22 0949 -- -- -- -- 95 %   12/12/22 0940 -- -- -- -- (!) 85 %   12/12/22 0734 97.9 °F (36.6 °C) 99 22 (!) 143/71 95 %       Pain Assessment  Pain Level: 0 (12/12/22 0833)  Pain Location: Back  Patient's Stated Pain Goal: 0 - No pain    Ambulating  Yes    Shift report given to oncoming nurse at the bedside.     Kathi Angela RN

## 2022-12-14 VITALS
DIASTOLIC BLOOD PRESSURE: 68 MMHG | SYSTOLIC BLOOD PRESSURE: 114 MMHG | BODY MASS INDEX: 19.15 KG/M2 | OXYGEN SATURATION: 93 % | RESPIRATION RATE: 18 BRPM | TEMPERATURE: 98.6 F | HEIGHT: 59 IN | HEART RATE: 99 BPM | WEIGHT: 95 LBS

## 2022-12-14 LAB
ANION GAP SERPL CALC-SCNC: 7 MMOL/L (ref 2–11)
BASOPHILS # BLD: 0 K/UL (ref 0–0.2)
BASOPHILS NFR BLD: 1 % (ref 0–2)
BUN SERPL-MCNC: 10 MG/DL (ref 8–23)
CALCIUM SERPL-MCNC: 8.2 MG/DL (ref 8.3–10.4)
CHLORIDE SERPL-SCNC: 106 MMOL/L (ref 101–110)
CO2 SERPL-SCNC: 27 MMOL/L (ref 21–32)
CREAT SERPL-MCNC: 0.6 MG/DL (ref 0.6–1)
DIFFERENTIAL METHOD BLD: ABNORMAL
EOSINOPHIL # BLD: 0.5 K/UL (ref 0–0.8)
EOSINOPHIL NFR BLD: 8 % (ref 0.5–7.8)
ERYTHROCYTE [DISTWIDTH] IN BLOOD BY AUTOMATED COUNT: 15.7 % (ref 11.9–14.6)
GLUCOSE SERPL-MCNC: 102 MG/DL (ref 65–100)
HCT VFR BLD AUTO: 32.2 % (ref 35.8–46.3)
HGB BLD-MCNC: 10.5 G/DL (ref 11.7–15.4)
IMM GRANULOCYTES # BLD AUTO: 0.1 K/UL (ref 0–0.5)
IMM GRANULOCYTES NFR BLD AUTO: 1 % (ref 0–5)
LYMPHOCYTES # BLD: 1.5 K/UL (ref 0.5–4.6)
LYMPHOCYTES NFR BLD: 25 % (ref 13–44)
MCH RBC QN AUTO: 28.8 PG (ref 26.1–32.9)
MCHC RBC AUTO-ENTMCNC: 32.6 G/DL (ref 31.4–35)
MCV RBC AUTO: 88.5 FL (ref 82–102)
MONOCYTES # BLD: 0.7 K/UL (ref 0.1–1.3)
MONOCYTES NFR BLD: 11 % (ref 4–12)
NEUTS SEG # BLD: 3.2 K/UL (ref 1.7–8.2)
NEUTS SEG NFR BLD: 54 % (ref 43–78)
NRBC # BLD: 0 K/UL (ref 0–0.2)
NT PRO BNP: 3519 PG/ML
PHOSPHATE SERPL-MCNC: 4.5 MG/DL (ref 2.3–3.7)
PLATELET # BLD AUTO: 439 K/UL (ref 150–450)
PMV BLD AUTO: 10 FL (ref 9.4–12.3)
POTASSIUM SERPL-SCNC: 4 MMOL/L (ref 3.5–5.1)
RBC # BLD AUTO: 3.64 M/UL (ref 4.05–5.2)
SODIUM SERPL-SCNC: 140 MMOL/L (ref 133–143)
WBC # BLD AUTO: 5.9 K/UL (ref 4.3–11.1)

## 2022-12-14 PROCEDURE — 97530 THERAPEUTIC ACTIVITIES: CPT

## 2022-12-14 PROCEDURE — 6360000002 HC RX W HCPCS: Performed by: INTERNAL MEDICINE

## 2022-12-14 PROCEDURE — 6370000000 HC RX 637 (ALT 250 FOR IP): Performed by: INTERNAL MEDICINE

## 2022-12-14 PROCEDURE — 94761 N-INVAS EAR/PLS OXIMETRY MLT: CPT

## 2022-12-14 PROCEDURE — 84100 ASSAY OF PHOSPHORUS: CPT

## 2022-12-14 PROCEDURE — 2580000003 HC RX 258: Performed by: INTERNAL MEDICINE

## 2022-12-14 PROCEDURE — 85025 COMPLETE CBC W/AUTO DIFF WBC: CPT

## 2022-12-14 PROCEDURE — 80048 BASIC METABOLIC PNL TOTAL CA: CPT

## 2022-12-14 PROCEDURE — 2700000000 HC OXYGEN THERAPY PER DAY

## 2022-12-14 PROCEDURE — 94640 AIRWAY INHALATION TREATMENT: CPT

## 2022-12-14 PROCEDURE — 83880 ASSAY OF NATRIURETIC PEPTIDE: CPT

## 2022-12-14 PROCEDURE — 6360000002 HC RX W HCPCS: Performed by: STUDENT IN AN ORGANIZED HEALTH CARE EDUCATION/TRAINING PROGRAM

## 2022-12-14 PROCEDURE — 36415 COLL VENOUS BLD VENIPUNCTURE: CPT

## 2022-12-14 RX ORDER — PANTOPRAZOLE SODIUM 40 MG/1
40 TABLET, DELAYED RELEASE ORAL
Qty: 30 TABLET | Refills: 3 | Status: SHIPPED | OUTPATIENT
Start: 2022-12-15

## 2022-12-14 RX ORDER — CEFPODOXIME PROXETIL 200 MG/1
200 TABLET, FILM COATED ORAL 2 TIMES DAILY
Qty: 6 TABLET | Refills: 0 | Status: SHIPPED | OUTPATIENT
Start: 2022-12-14 | End: 2022-12-17

## 2022-12-14 RX ADMIN — LACTOBACILLUS TAB 4 TABLET: TAB at 10:04

## 2022-12-14 RX ADMIN — HEPARIN SODIUM 5000 UNITS: 5000 INJECTION INTRAVENOUS; SUBCUTANEOUS at 10:04

## 2022-12-14 RX ADMIN — FUROSEMIDE 40 MG: 10 INJECTION, SOLUTION INTRAMUSCULAR; INTRAVENOUS at 10:04

## 2022-12-14 RX ADMIN — SODIUM CHLORIDE, PRESERVATIVE FREE 10 ML: 5 INJECTION INTRAVENOUS at 10:05

## 2022-12-14 RX ADMIN — BUDESONIDE 500 MCG: 0.5 INHALANT RESPIRATORY (INHALATION) at 07:01

## 2022-12-14 RX ADMIN — PANTOPRAZOLE SODIUM 40 MG: 40 TABLET, DELAYED RELEASE ORAL at 05:40

## 2022-12-14 RX ADMIN — IPRATROPIUM BROMIDE AND ALBUTEROL SULFATE 1 AMPULE: 2.5; .5 SOLUTION RESPIRATORY (INHALATION) at 07:01

## 2022-12-14 NOTE — PROGRESS NOTES
END OF SHIFT NOTE:    INTAKE/OUTPUT  12/13 0701 - 12/14 0700  In: 717 [P.O.:717]  Out: 3900 [Urine:3900]  Voiding: Yes  Catheter: No          Flatus: Patient does have flatus present. Stool:  2 occurrences. Characteristics: Loose  Amount: Medium  Source: Rectum  Color: Brown  Last BM (including prior to admit): 12/13/2022       Emesis: 0 occurrences. VITAL SIGNS  Patient Vitals for the past 12 hrs:   Temp Pulse Resp BP SpO2   12/14/22 0507 97.7 °F (36.5 °C) 89 18 126/68 95 %   12/13/22 2300 98 °F (36.7 °C) 88 17 128/68 97 %   12/13/22 2028 -- 90 18 -- 94 %   12/13/22 1958 98.4 °F (36.9 °C) 90 17 134/72 97 %   12/13/22 1914 -- 90 -- -- --       Ambulating  Yes    Shift report given to oncoming nurse at the bedside.     Stefanie Card RN

## 2022-12-14 NOTE — DISCHARGE SUMMARY
Hospitalist Discharge Summary   Admit Date:  2022  3:15 PM   DC Note date: 2022  Name:  Maximino Oakes   Age:  80 y.o. Sex:  female  :  1940   MRN:  [id number]   Room:    PCP:  Kanika Chen MD    Presenting Complaint: Chest Pain     Initial Admission Diagnosis: Peritoneal free air [K66.8]  Severe sepsis ([address] [A41.9, R65.20]  Pneumonia of right upper lobe due to infectious organism [J18.9]  Chest pain, unspecified type [R07.9]  Sepsis, due to unspecified organism, unspecified whether acute organ dysfunction present [facility]) [A41.9]     Problem List for this Hospitalization (present on admission):    Principal Problem:    Severe sepsis ([address]  Active Problems:    Acute respiratory failure with hypoxemia ([address]    Hyponatremia  Resolved Problems:    * No resolved hospital problems. *      Hospital Course:  Maximino Oakes is a 80 y.o. female with medical history of Afib s/p PPM (not on AC) presents with sudden onset worsening shortness of breath and fevers. She says \"I have had pneumonia in the past a few times and this feels like it. \" Says \"I have just been so tired and I could sleep all day. \" Does have some shortness of breath.    l. Of note, incidental finding of possible duodenal perforation seen on CT imaging (was originally ordered to evaluate for PE). Patient does have \"some reflux whenever I eat something acidic\". ED Course: Sodium of 131. LA of 2.1. WBC count of 24k. Blood cultures negative. CT chest negative for PE. Initial Ct chest also suggested duodenal perforation and Flagyl. however, dedicated CT abd/pelvis ordered to evaluate for perforation and is negative for any intra-abdominal pathology. Streptococcus pneumonia antigen is positive. Chest x-ray showed pneumonia. She was on oxygen saturating 94 % on 2 LNC. BNP of 11k and repeat BNP is 3k . She was treated with antibiotics and Lasix. RT assessed and patient is requiring 2 LNC with ambulation.  Echo with normal ejection fraction. Patient is hemodynamically stable for discharge. Disposition: Home  Diet: ADULT DIET; Regular  Code Status: Full Code    Follow Ups:      Time spent in patient discharge and coordination   35 minutes. Follow up labs/diagnostics (ultimately defer to outpatient provider): Follow-up with PCP in 3 to 5 days  Follow-up with cardiology in 2 weeks    Plan was discussed with patient. All questions answered. Patient was stable at time of discharge. Instructions given to call a physician or return if any concerns. Current Discharge Medication List        CONTINUE these medications which have NOT CHANGED    Details   Cholecalciferol (VITAMIN D3) 125 MCG (5000 UT) TABS Take by mouth daily Unsure of mcg, takes daily in am      Biotin 10 MG CAPS Take by mouth daily Pt unsure of mg, takes daily in am      melatonin 5 MG TABS tablet Take 5 mg by mouth nightly             Procedures done this admission:  * No surgery found *    Consults this admission:  [address]    Echocardiogram results:  12/07/22    TRANSTHORACIC ECHOCARDIOGRAM (TTE) COMPLETE (CONTRAST/BUBBLE/3D PRN) 12/13/2022  1:58 PM (Final)    Interpretation Summary    Left Ventricle: Normal left ventricular systolic function with a visually estimated EF of 60 - 65%. Left ventricle size is normal. Normal wall thickness. Normal wall motion. Diastolic dysfunction present with normal LV EF. Aortic Valve: Mild regurgitation. Tricuspid Valve: The estimated RVSP is mildly elevated at around 44 mmHg. Left Atrium: Left atrium is moderate to severely dilated. Technical qualifiers: Color flow Doppler was performed and pulse wave and/or continuous wave Doppler was performed. Signed by: Jasmeet Hein MD on 12/13/2022  1:58 PM      Diagnostic Imaging/Tests:   CT ABDOMEN PELVIS W IV CONTRAST Additional Contrast? Oral    Result Date: 12/7/2022  1. No acute intra-abdominal or pelvic abnormality.  2. 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 3. Age-indeterminate moderate L2 compression fracture deformity. CPT code(s): 41652, [id number]    XR CHEST PORTABLE    Result Date: 12/11/2022  1. PERSISTENT RIGHT UPPER LOBE INFILTRATE WITH AIR BRONCHOGRAMS IS NONSPECIFIC BUT CONSISTENT WITH PNEUMONIA. 2.  MILD CARDIOMEGALY WITHOUT EVIDENCE OF SHARON CONGESTIVE HEART FAILURE OR OTHER ACUTE CARDIOPULMONARY DISEASE. CT CHEST PULMONARY EMBOLISM W CONTRAST    Result Date: 12/7/2022  1. No evidence of pulmonary embolic disease. 2. Posterior right upper lobe pneumonia. 3. Bibasilar bronchiectatic changes. 4. Small focus of free air is suggested in the berenice hepatis concerning for duodenal ulcer perforation. Clinical correlation is recommended with additional imaging as indicated. The findings contained in this report were communicated to the emergency room physician  by  Dr Maria R Falcon on 12/7/2022 5:39 PM. CPT code(s) 55289        Labs: Results:       BMP, Mg, Phos Recent Labs     12/12/22  0759 12/14/22  0634    140   K 3.8 4.0    106   CO2 26 27   ANIONGAP 6 7   BUN 6* 10   CREATININE 0.40* 0.60   LABGLOM >60 >60   CALCIUM 8.0* 8.2*   GLUCOSE 106* 102*   PHOS  --  4.5*      CBC Recent Labs     12/12/22  0759 12/14/22  0634   WBC 6.8 5.9   RBC 3.28* 3.64*   HGB 9.4* 10.5*   HCT 29.4* 32.2*   MCV 89.6 88.5   MCH 28.7 28.8   MCHC 32.0 32.6   RDW 15.9* 15.7*    439   MPV 10.6 10.0   NRBC 0.00 0.00   SEGS 62 54   LYMPHOPCT 21 25   EOSRELPCT 4 8*   MONOPCT 12 11   BASOPCT 0 1   IMMGRAN 1 1   SEGSABS 4.2 3.2   LYMPHSABS 1.4 1.5   EOSABS 0.3 0.5   MONOSABS 0.8 0.7   BASOSABS 0.0 0.0   ABSIMMGRAN 0.1 0.1      LFT No results for input(s): BILITOT, BILIDIR, ALKPHOS, AST, ALT, PROT, LABALBU, GLOB in the last 72 hours.    Cardiac  Lab Results   Component Value Date/Time    NTPROBNP 3,519 12/14/2022 09:56 AM    NTPROBNP 11,135 12/12/2022 12:53 PM    TROPHS 12.1 12/07/2022 03:32 PM      Coags No results found for: PROTIME, INR, APTT   A1c No results found for: LABA1C, EAG   Lipids No results found for: CHOL, LDLCALC, LABVLDL, HDL, CHOLHDLRATIO, TRIG   Thyroid  No results found for: [place] [employer]     Most Recent UA No results found for: COLORU, APPEARANCE, SPECGRAV, LABPH, PROTEINU, GLUCOSEU, KETUA, BILIRUBINUR, BLOODU, UROBILINOGEN, NITRU, LEUKOCYTESUR, WBCUA, RBCUA, EPITHUA, BACTERIA, LABCAST, MUCUS     Recent Labs     12/07/22  1641 12/07/22  1640   CULTURE NO GROWTH 5 DAYS NO GROWTH 5 DAYS       All Labs from Last 24 Hrs:  Recent Results (from the past 24 hour(s))   CBC with Auto Differential    Collection Time: 12/14/22  6:34 AM   Result Value Ref Range    WBC 5.9 4.3 - 11.1 K/uL    RBC 3.64 (L) 4.05 - 5.2 M/uL    Hemoglobin 10.5 (L) 11.7 - 15.4 g/dL    Hematocrit 32.2 (L) 35.8 - 46.3 %    MCV 88.5 82 - 102 FL    MCH 28.8 26.1 - 32.9 PG    MCHC 32.6 31.4 - 35.0 g/dL    RDW 15.7 (H) 11.9 - 14.6 %    Platelets 281 161 - 815 K/uL    MPV 10.0 9.4 - 12.3 FL    nRBC 0.00 0.0 - 0.2 K/uL    Differential Type AUTOMATED      Seg Neutrophils 54 43 - 78 %    Lymphocytes 25 13 - 44 %    Monocytes 11 4.0 - 12.0 %    Eosinophils % 8 (H) 0.5 - 7.8 %    Basophils 1 0.0 - 2.0 %    Immature Granulocytes 1 0.0 - 5.0 %    Segs Absolute 3.2 1.7 - 8.2 K/UL    Absolute Lymph # 1.5 0.5 - 4.6 K/UL    Absolute Mono # 0.7 0.1 - 1.3 K/UL    Absolute Eos # 0.5 0.0 - 0.8 K/UL    Basophils Absolute 0.0 0.0 - 0.2 K/UL    Absolute Immature Granulocyte 0.1 0.0 - 0.5 K/UL   Basic Metabolic Panel w/ Reflex to MG    Collection Time: 12/14/22  6:34 AM   Result Value Ref Range    Sodium 140 133 - 143 mmol/L    Potassium 4.0 3.5 - 5.1 mmol/L    Chloride 106 101 - 110 mmol/L    CO2 27 21 - 32 mmol/L    Anion Gap 7 2 - 11 mmol/L    Glucose 102 (H) 65 - 100 mg/dL    BUN 10 8 - 23 MG/DL    Creatinine 0.60 0.6 - 1.0 MG/DL    Est, Glom Filt Rate >60 >60 ml/min/1.73m2 Calcium 8.2 (L) 8.3 - 10.4 MG/DL   Phosphorus    Collection Time: 12/14/22  6:34 AM   Result Value Ref Range    Phosphorus 4.5 (H) 2.3 - 3.7 MG/DL   Brain Natriuretic Peptide    Collection Time: 12/14/22  9:56 AM   Result Value Ref Range    NT Pro-BNP 3,519 (H) <450 PG/ML       No Known Allergies  Immunization History   Administered Date(s) Administered    PPD Test 12/07/2022       Recent Vital Data:  Patient Vitals for the past 24 hrs:   Temp Pulse Resp BP SpO2   12/14/22 1132 98.3 °F (36.8 °C) 87 20 (!) 100/54 94 %   12/14/22 0709 97.7 °F (36.5 °C) 80 18 132/61 99 %   12/14/22 0705 -- 68 20 -- 94 %   12/14/22 0507 97.7 °F (36.5 °C) 89 18 126/68 95 %   12/13/22 2300 98 °F (36.7 °C) 88 17 128/68 97 %   12/13/22 2028 -- 90 18 -- 94 %   12/13/22 1958 98.4 °F (36.9 °C) 90 17 134/72 97 %   12/13/22 1914 -- 90 -- -- --   12/13/22 1443 97.5 °F (36.4 °C) 96 20 113/71 95 %       Oxygen Therapy  SpO2: 94 %  Pulse via Oximetry: 108 beats per minute  Pulse Oximeter Device Mode: Intermittent  O2 Device: Nasal cannula  O2 Flow Rate (L/min): 2 L/min  O2 Delivery Method: Nasal cannula    Estimated body mass index is 19.19 kg/m² as calculated from the following:    Height as of this encounter: 4' 11\" (1.499 m). Weight as of this encounter: 95 lb (43.1 kg). Intake/Output Summary (Last 24 hours) at 12/14/2022 1357  Last data filed at 12/14/2022 1329  Gross per 24 hour   Intake 957 ml   Output 4550 ml   Net -3593 ml         Physical Exam:    General:    Well nourished. No overt distress  Head:  Normocephalic, atraumatic  Eyes:  Sclerae appear normal.  Pupils equally round. HENT:  Nares appear normal, no drainage. Moist mucous membranes  Neck:  No restricted ROM. Trachea midline  CV:   RRR. No m/r/g. No JVD  Lungs:   CTAB. No wheezing, rhonchi, or rales. Respirations even, unlabored  Abdomen:   Soft, nontender, nondistended. Extremities: Warm and dry. No cyanosis or clubbing. No edema. Skin:     No rashes. Normal coloration  Neuro:  CN II-XII grossly intact. Psych:  Normal mood and affect. Signed:  Pierre Bang MD    Part of this note may have been written by using a voice dictation software. The note has been proof read but may still contain some grammatical/other typographical errors.

## 2022-12-14 NOTE — CARE COORDINATION
Patient with discharge orders today. Therapy recommending home health, discussed with patient and referral made to [facility]. Patient requested for Mom[employer] Meals with [facility]; RNCM placed call to [phone] and per [place]; will start 12/21 d/t 2 day transit. Patient's family to provide transportation home. Patient has met all treatment goals and milestones. CM following until discharged. ASSESSMENT NOTE    Attending Physician: Army Richi MD  Admit Problem: Peritoneal free air [K66.8]  Severe sepsis ([facility] [A41.9, R65.20]  Pneumonia of right upper lobe due to infectious organism [J18.9]  Chest pain, unspecified type [R07.9]  Sepsis, due to unspecified organism, unspecified whether acute organ dysfunction present [facility]) [A41.9]  Date/Time of Admission: 12/7/2022  3:15 PM  Problem List:  Patient Active Problem List   Diagnosis    Severe sepsis ([facility]    Acute respiratory failure with hypoxemia ([facility]    Hyponatremia       Service Assessment  Patient Orientation Alert and Oriented   Cognition Alert   History Provided By Patient   Primary Caregiver Self   Accompanied By/Relationship     Re[address] Cer[address] is: Legal Next of Herlinda 69   PCP Verified by CM Yes (Dr. Palma Russo [phone])   Last Visit to PCP Within last 3 months   Prior Functional Level Independent in ADLs/IADLs   Current Functional Level Independent in ADLs/IADLs   Can patient return to prior living arrangement Yes   Ability to make needs known: Good   Family able to assist with home care needs:     Would you like for me to discuss the discharge plan with any other family members/significant others, and if so, who?  Yes (Spouse - Daniel)   Financial Resources Medicare ([employer])   Community Resources     CM/SW Referral       Social/Functional History  Lives With Spouse   Type of [address] One level   Home Access Ramped entrance   [address] - Number of Steps Entrance Stairs - Rails     Bathroom Shower/Tub     Bathroom Toilet     Bathroom Equipment     Bathroom Accessibility     Home Equipment     Receives Help From Family   ADL Assistance Independent   Bath     Dressing     Grooming     Feeding     Toileting     Homemaking Assistance Independent   Meal Prep     Laundry     Vacuuming     Cleaning     Gardening     Yard Work     Driving     Shopping          Other (Comment)     Homemaking Responsibilities     Meal Prep Responsibility     Laundry Responsibility     Cleaning Responsibility     Bill Paying/Finance Responsibility     [address] Management     Other (Comment)     Ambulation Assistance Independent   Transfer Assistance     Active  Yes   Patient's  Info     Mode of Transportation Car   Education     Occupation Retired   Type of Occupation       Discharge [address] Spouse/Significant Other ([address] [facility])   [place] Spouse/Significant Other   Current Services Prior To Admission [address] Other (Comment) ([employer] with [facility])   DME Kalyani Manifold   DME     DME Ordered? No   Potential Assistance Purchasing Medications No   Meds-to-Beds: Does the patient want to have any new prescriptions delivered to bedside prior to discharge? Type of Home Care Services None   Patient expects to be discharged to: House   Follow Up Appointment: Best Day/Time     One/Two Story Residence: One story   # of Interior Steps     Height of Each Step (in)     [employer] Available     History of Falls?  No     Services At/After Discharge  Transition of Care Consult (CM Consult): Discharge Planning, Home Health, Other ([employer] with [facility])   Internal Home Health     Internal Hospice     Reason Outside Agency [address]     Partner SNF     Reason Why Partner SNF Not Chosen     Internal Comfort Care     Reason Outside [address] Discharge [address] Resource Information Provided? No   Mode of Transport at Discharge [address] Time of Discharge     Confirm Follow Up Transport Self     Condition of Participation: Discharge Planning  The plan for Transition of Care is related to the following treatment goals: Return home with family support and home health   The Patient and/or Patient Representative was provided with a Choice of Provider? Patient   Name of the Patient Representative who was provided with the Choice of Provider and agrees with the Discharge Plan? The Patient and/or Patient Representative Agree with the Discharge Plan? Yes   Freedom of Choice list was provided with basic dialogue that supports the individualized plan of care/goals, treatment preferences, and shares the quality data associated with the providers?  Yes     Documentation for Discharge Appeal  Discharge Appealed by     Date notified by QIO of appeal request:     Time notified by QIO of appeal request:     Detailed Notice of Discharge given to:     Date Notice of Discharge given:     Time Notice of Discharge given:     Date records sent to [address]     Time records sent to [address] [employer]     Date Notified of Outcome     Time Notified of Outcome     Outcome of appeal           Sunil Vega RN 12/14/22 3:49 PM

## 2022-12-14 NOTE — PROGRESS NOTES
END OF SHIFT NOTE:    INTAKE/OUTPUT  12/12 0701 - 12/13 0700  In: -   Out: 850 [Urine:850]  Voiding: Yes  Catheter: No  Drain:      Flatus: Patient does have flatus present. Stool: 1 occurrences. Characteristics:  Stool Appearance: Loose  Stool Color: Brown  Stool Amount: Medium  Stool Assessment  Incontinence: No  Stool Appearance: Loose  Stool Color: Brown  Stool Amount: Medium  Stool Source: Rectum  Last BM (including prior to admit): 12/13/22    Emesis: 0 occurrences. Characteristics:        VITAL SIGNS  Patient Vitals for the past 12 hrs:   Temp Pulse Resp BP SpO2   12/13/22 1443 97.5 °F (36.4 °C) 96 20 113/71 95 %   12/13/22 1142 97.9 °F (36.6 °C) 90 18 117/65 96 %       Pain Assessment  Pain Level: 0 (12/12/22 6929)  Pain Location: Back  Patient's Stated Pain Goal: 0 - No pain    Ambulating  Yes    Shift report given to oncoming nurse at the bedside.     Gloria Lowe RN

## 2022-12-14 NOTE — DISCHARGE INSTRUCTIONS
DISCHARGE SUMMARY from Nurse    PATIENT INSTRUCTIONS:    After general anesthesia or intravenous sedation, for 24 hours or while taking prescription Narcotics:  Limit your activities  Do not drive and operate hazardous machinery  Do not make important personal or business decisions  Do  not drink alcoholic beverages  If you have not urinated within 8 hours after discharge, please contact your surgeon on call. Report the following to your surgeon:  Excessive pain, swelling, redness or odor of or around the surgical area  Temperature over 100.5  Nausea and vomiting lasting longer than 4 hours or if unable to take medications  Any signs of decreased circulation or nerve impairment to extremity: change in color, persistent  numbness, tingling, coldness or increase pain  Any questions    What to do at Home:  Recommended activity: activity as tolerated and no driving for today. If you experience any of the following symptoms fever of 101 or greater, pain unrelieved by medication, shortness of breath that doesn't get better with rest please follow up with PCP. *  Please give a list of your current medications to your Primary Care Provider. *  Please update this list whenever your medications are discontinued, doses are      changed, or new medications (including over-the-counter products) are added. *  Please carry medication information at all times in case of emergency situations. These are general instructions for a healthy lifestyle:    No smoking/ No tobacco products/ Avoid exposure to second hand smoke  Surgeon General's Warning:  Quitting smoking now greatly reduces serious risk to your health.     Obesity, smoking, and sedentary lifestyle greatly increases your risk for illness    A healthy diet, regular physical exercise & weight monitoring are important for maintaining a healthy lifestyle    You may be retaining fluid if you have a history of heart failure or if you experience any of the following symptoms:  Weight gain of 3 pounds or more overnight or 5 pounds in a week, increased swelling in our hands or feet or shortness of breath while lying flat in bed. Please call your doctor as soon as you notice any of these symptoms; do not wait until your next office visit. The discharge information has been reviewed with the patient. The patient verbalized understanding. Discharge medications reviewed with the patient and appropriate educational materials and side effects teaching were provided. ___________________________________________________________________________________________________________________________________         Pneumonia: Care Instructions  Overview     Pneumonia is an infection of the lungs. Most cases are caused by infections from bacteria or viruses. Pneumonia may be mild or very severe. If it is caused by bacteria, you will be treated with antibiotics. It may take a few weeks to a few months to recover fully from pneumonia, depending on how sick you were and whether your overall health is good. Follow-up care is a key part of your treatment and safety. Be sure to make and go to all appointments, and call your doctor if you are having problems. It's also a good idea to know your test results and keep a list of the medicines you take. How can you care for yourself at home? Take your antibiotics exactly as directed. Do not stop taking the medicine just because you are feeling better. You need to take the full course of antibiotics. Take your medicines exactly as prescribed. Call your doctor if you think you are having a problem with your medicine. Get plenty of rest and sleep. You may feel weak and tired for a while, but your energy level will improve with time. To prevent dehydration, drink plenty of fluids. Choose water and other clear liquids.  If you have kidney, heart, or liver disease and have to limit fluids, talk with your doctor before you increase the amount of fluids you drink. Take care of your cough so you can rest. A cough that brings up mucus from your lungs is common with pneumonia. It is one way your body gets rid of the infection. But if coughing keeps you from resting or causes severe fatigue and chest-wall pain, talk to your doctor. Your doctor may suggest that you take a medicine to reduce the cough. Use a vaporizer or humidifier to add moisture to your bedroom. Follow the directions for cleaning the machine. Do not smoke or allow others to smoke around you. Smoke will make your cough last longer. If you need help quitting, talk to your doctor about stop-smoking programs and medicines. These can increase your chances of quitting for good. Take an over-the-counter pain medicine, such as acetaminophen (Tylenol), ibuprofen (Advil, Motrin), or naproxen (Aleve). Read and follow all instructions on the label. Do not take two or more pain medicines at the same time unless the doctor told you to. Many pain medicines have acetaminophen, which is Tylenol. Too much acetaminophen (Tylenol) can be harmful. If you were given a spirometer to measure how well your lungs are working, use it as instructed. This can help your doctor tell how your recovery is going. To prevent pneumonia in the future, talk to your doctor about getting a yearly flu vaccine and a pneumococcal vaccine. Stay up to date on your COVID-19 vaccines. When should you call for help? Call 911 anytime you think you may need emergency care. For example, call if:    You have severe trouble breathing. Call your doctor now or seek immediate medical care if:    You cough up dark brown or bloody mucus (sputum). You have new or worse trouble breathing. You are dizzy or lightheaded, or you feel like you may faint. Watch closely for changes in your health, and be sure to contact your doctor if:    You have a new or higher fever. You are coughing more deeply or more often.      You are not getting better after 2 days (48 hours). You do not get better as expected. Where can you learn more? Go to http://www.woods.com/ and enter D336 to learn more about \"Pneumonia: Care Instructions. \"  Current as of: February 9, 2022               Content Version: 13.5  © 2311-0954 [employer]. Care instructions adapted under license by [facility] ([facility]). If you have questions about a medical condition or this instruction, always ask your healthcare professional. [address] any warranty or liability for your use of this information.

## 2022-12-14 NOTE — PROGRESS NOTES
Oxygen Qualifier       Room air: SpO2 with O2 and liter flow   Resting SpO2  93%     Ambulating SpO2  92%        Completed by:Patient ambulated without difficulty and did not require supplemental O2    Rickey Jade RCP

## 2022-12-14 NOTE — PROGRESS NOTES
Discharge teaching complete. Patient verbalized understanding of diet, activity, s/sx as reviewed, and follow up appointments. Rx's sent to patient's pharmacy. Discharge instructions given to patient. IV d/c'd with tip intact and dressing applied.

## 2022-12-14 NOTE — PROGRESS NOTES
ACUTE PHYSICAL THERAPY GOALS:   (Developed with and agreed upon by patient and/or caregiver.)    (1.) Katy Alvarezlinax  will move from supine to sit and sit to supine , scoot up and down, and roll side to side with MODIFIED INDEPENDENCE within 7 treatment day(s). GOAL MET: 12/14  (2.) Katy Alvarezlinax will transfer from bed to chair and chair to bed with MODIFIED INDEPENDENCE using the least restrictive device within 7 treatment day(s). GOAL MET: 12/14  (3.) Katy Thelma will ambulate with SUPERVISION for 500 feet with the least restrictive device within 7 treatment day(s). GOAL MET: 12/14  (4.) Katy Alvarezlinax will perform standing static and dynamic balance activities x 10 minutes with SUPERVISION to improve safety within 7 treatment day(s). GOAL MET: 12/14  (5.) Katy Alvarezlinax will perform bilateral lower extremity exercises x 15 min for HEP with MODIFIED INDEPENDENCE to improve strength, endurance, and functional mobility within 7 treatment day(s). PHYSICAL THERAPY: Daily Note PM   (Link to Caseload Tracking: PT Visit Days : 2  Time In/Out PT Charge Capture  Rehab Caseload Tracker  Orders    Angelito Galaviz is a 80 y.o. female   PRIMARY DIAGNOSIS: Severe sepsis ([address]  Peritoneal free air [K66.8]  Severe sepsis ([address] [A41.9, R65.20]  Pneumonia of right upper lobe due to infectious organism [J18.9]  Chest pain, unspecified type [R07.9]  Sepsis, due to unspecified organism, unspecified whether acute organ dysfunction present ([address] [A41.9]       Inpatient: Payor: Kirti Melo / Plan: [employer]MO / Product Type: *No Product type* /     ASSESSMENT:     REHAB RECOMMENDATIONS:   Recommendation to date pending progress:  Setting:  No further skilled therapy after discharge from hospital    Equipment:    None     ASSESSMENT:  Ms. Fritz Ford is making excellent progress toward goals and states that she is feeling like normal again. She performed multiple transfers and is moving around in the room with IND. She ambulated with no LOB or unsteadiness and no AD. Discussed mobility at home and encouraged patient to continue getting up and moving around as well as sitting upright as much as possible.       SUBJECTIVE:   Ms. Miguel Ángel Palmer states, \"Tell them I'm ready to go home\"     Social/Functional Lives With: Spouse  Type of Home: House  Home Layout: One level  Home Access: Ramped entrance  ADL Assistance: Independent  Homemaking Assistance: Independent  Ambulation Assistance: Independent  OBJECTIVE:     PAIN: Jada Revering / O2: Jase Puente / Mala Chatman / Hansa Cough:   Pre Treatment: 0/10         Post Treatment: 0/10 Vitals        Oxygen: remains >90% on 2L    None    RESTRICTIONS/PRECAUTIONS:        MOBILITY: I Mod I S SBA CGA Min Mod Max Total  NT x2 Comments:   Bed Mobility    Rolling [] [] [] [] [] [] [] [] [] [x] []    Supine to Sit [] [] [] [] [] [] [] [] [] [x] [] Up in chair upon arrival   Scooting [] [] [] [] [] [] [] [] [] [x] []    Sit to Supine [] [] [] [] [] [] [] [] [] [x] []    Transfers    Sit to Stand [x] [] [] [] [] [] [] [] [] [] []    Chair to toilet [x] [] [] [] [] [] [] [] [] [] []    Stand to Sit [x] [] [] [] [] [] [] [] [] [] []     [] [] [] [] [] [] [] [] [] [] []    I=Independent, Mod I=Modified Independent, S=Supervision, SBA=Standby Assistance, CGA=Contact Guard Assistance,   Min=Minimal Assistance, Mod=Moderate Assistance, Max=Maximal Assistance, Total=Total Assistance, NT=Not Tested    BALANCE: Good Fair+ Fair Fair- Poor NT Comments   Sitting Static [x] [] [] [] [] []    Sitting Dynamic [x] [] [] [] [] []              Standing Static [x] [] [] [] [] []    Standing Dynamic [x] [x] [] [] [] []      GAIT: I Mod I S SBA CGA Min Mod Max Total  NT x2 Comments:   Level of Assistance [x] [] [] [] [] [] [] [] [] [] []    Distance 500  feet    DME None    Gait Quality Decreased step clearance    Weightbearing Status      Stairs      I=Independent, Mod I=Modified Independent, S=Supervision, SBA=Standby Assistance, CGA=Contact Guard Assistance,   Min=Minimal Assistance, Mod=Moderate Assistance, Max=Maximal Assistance, Total=Total Assistance, NT=Not Tested    PLAN:   FREQUENCY AND DURATION: 3 times/week for duration of hospital stay or until stated goals are met, whichever comes first.    TREATMENT:   TREATMENT:   Therapeutic Activity (10 Minutes): Therapeutic activity included Transfer Training, Ambulation on level ground, Sitting balance , and Standing balance to improve functional Activity tolerance, Balance, Coordination, Mobility, and Strength.     TREATMENT GRID:  N/A    AFTER TREATMENT PRECAUTIONS: Bed/Chair Locked, Call light within reach, Chair, Needs within reach, and RN notified    INTERDISCIPLINARY COLLABORATION:  RN/ PCT    EDUCATION:      TIME IN/OUT:  Time In: 1504  Time Out: [address]  Minutes: 8080 ZUNILDA Saha PTA

## 2023-01-05 ENCOUNTER — OFFICE VISIT (OUTPATIENT)
Dept: CARDIOLOGY CLINIC | Age: 83
End: 2023-01-05
Payer: MEDICARE

## 2023-01-05 VITALS
HEART RATE: 70 BPM | SYSTOLIC BLOOD PRESSURE: 116 MMHG | HEIGHT: 59 IN | BODY MASS INDEX: 18.43 KG/M2 | WEIGHT: 91.4 LBS | DIASTOLIC BLOOD PRESSURE: 60 MMHG

## 2023-01-05 DIAGNOSIS — I48.0 PAF (PAROXYSMAL ATRIAL FIBRILLATION) (HCC): Primary | ICD-10-CM

## 2023-01-05 DIAGNOSIS — Z53.20 REFUSAL OF ANTICOAGULANT MEDICATION BY PATIENT: ICD-10-CM

## 2023-01-05 PROCEDURE — 1123F ACP DISCUSS/DSCN MKR DOCD: CPT | Performed by: INTERNAL MEDICINE

## 2023-01-05 PROCEDURE — G8484 FLU IMMUNIZE NO ADMIN: HCPCS | Performed by: INTERNAL MEDICINE

## 2023-01-05 PROCEDURE — 1036F TOBACCO NON-USER: CPT | Performed by: INTERNAL MEDICINE

## 2023-01-05 PROCEDURE — 99214 OFFICE O/P EST MOD 30 MIN: CPT | Performed by: INTERNAL MEDICINE

## 2023-01-05 PROCEDURE — G8399 PT W/DXA RESULTS DOCUMENT: HCPCS | Performed by: INTERNAL MEDICINE

## 2023-01-05 PROCEDURE — 1090F PRES/ABSN URINE INCON ASSESS: CPT | Performed by: INTERNAL MEDICINE

## 2023-01-05 PROCEDURE — G8428 CUR MEDS NOT DOCUMENT: HCPCS | Performed by: INTERNAL MEDICINE

## 2023-01-05 PROCEDURE — G8419 CALC BMI OUT NRM PARAM NOF/U: HCPCS | Performed by: INTERNAL MEDICINE

## 2023-01-05 RX ORDER — ACETAMINOPHEN 160 MG
TABLET,DISINTEGRATING ORAL
COMMUNITY

## 2023-01-05 RX ORDER — AMITRIPTYLINE HYDROCHLORIDE 25 MG/1
TABLET, FILM COATED ORAL
COMMUNITY
Start: 2022-11-09

## 2023-01-05 RX ORDER — PANTOPRAZOLE SODIUM 40 MG/1
TABLET, DELAYED RELEASE ORAL
COMMUNITY
Start: 2022-12-14

## 2023-01-05 NOTE — PROGRESS NOTES
[facility] CARDIOLOGY  [address], [address]  PHONE: [phone]        23        NAME:  Aurora Renee  : 1940  MRN: [id number]     CHIEF COMPLAINT:    Follow-Up from Hospital         SUBJECTIVE:     Recent hosp for pneumonia and pleurisy. Home 2 weeks. OK currently. No a fib. Medications were all reviewed with the patient today and updated as necessary. Current Outpatient Medications   Medication Sig    amitriptyline (ELAVIL) 25 MG tablet     pantoprazole (PROTONIX) 40 MG tablet     Cholecalciferol (VITAMIN D3) 50 MCG (2000) CAPS Take by mouth    albuterol (PROVENTIL) (2.5 MG/3ML) 0.083% nebulizer solution Inhale 2.5 mg into the lungs 4 times daily     No current facility-administered medications for this visit. No Known Allergies        PHYSICAL EXAM:     Wt Readings from Last 3 Encounters:   23 91 lb 6.4 oz (41.5 kg)   22 94 lb 8 oz (42.9 kg)   21 96 lb (43.5 kg)     BP Readings from Last 3 Encounters:   23 116/60   22 110/60   21 (!) 120/50       /60   Pulse 70   Ht 4' 11\" (1.499 m)   Wt 91 lb 6.4 oz (41.5 kg)   BMI 18.46 kg/m²     Physical Exam  Vitals reviewed. HENT:      Head: Normocephalic and atraumatic. Eyes:      Extraocular Movements: Extraocular movements intact. Pupils: Pupils are equal, round, and reactive to light. Cardiovascular:      Rate and Rhythm: Normal rate. Heart sounds: Normal heart sounds. Pulmonary:      Effort: Pulmonary effort is normal.      Breath sounds: Normal breath sounds. Abdominal:      General: Abdomen is flat. Palpations: Abdomen is soft. There is no mass. Musculoskeletal:         General: Normal range of motion. Cervical back: Normal range of motion. Skin:     General: Skin is warm and dry. Neurological:      General: No focal deficit present. Mental Status: She is alert and oriented to person, place, and time. Psychiatric:         Mood and Affect: Mood normal.         RECENT LABS AND RECORDS REVIEW          ASSESSMENT and Wil Bahena was seen today for follow-up from hospital.    Diagnoses and all orders for this visit:    PAF (paroxysmal atrial fibrillation) ([facility]    Refusal of anticoagulant medication by patient     ///    CV status is stable. Medications and most recent labs reviewed. Diet and exercise are encouraged. Greater  than 50% of today's visit was devoted to counseling the patient, explaining disease concepts and offering advice and suggestions for optimal care. Return in about 1 year (around 1/5/2024).        Laura Dickinson MD  1/5/2023  12:27 PM

## 2023-02-06 PROCEDURE — 93296 REM INTERROG EVL PM/IDS: CPT | Performed by: INTERNAL MEDICINE

## 2023-02-06 PROCEDURE — 93294 REM INTERROG EVL PM/LDLS PM: CPT | Performed by: INTERNAL MEDICINE

## 2023-03-10 DIAGNOSIS — Z95.0 CARDIAC PACEMAKER: ICD-10-CM

## 2023-07-21 DIAGNOSIS — Z95.0 CARDIAC PACEMAKER: ICD-10-CM

## 2023-08-07 PROCEDURE — 93296 REM INTERROG EVL PM/IDS: CPT | Performed by: INTERNAL MEDICINE

## 2023-08-07 PROCEDURE — 93294 REM INTERROG EVL PM/LDLS PM: CPT | Performed by: INTERNAL MEDICINE

## 2023-09-15 ENCOUNTER — NURSE ONLY (OUTPATIENT)
Age: 83
End: 2023-09-15

## 2023-09-15 DIAGNOSIS — I49.5 SSS (SICK SINUS SYNDROME) (HCC): ICD-10-CM

## 2023-09-15 DIAGNOSIS — I48.0 PAF (PAROXYSMAL ATRIAL FIBRILLATION) (HCC): Primary | ICD-10-CM

## 2023-11-09 PROCEDURE — 93294 REM INTERROG EVL PM/LDLS PM: CPT | Performed by: INTERNAL MEDICINE

## 2023-11-09 PROCEDURE — 93296 REM INTERROG EVL PM/IDS: CPT | Performed by: INTERNAL MEDICINE

## 2024-05-15 PROCEDURE — 93296 REM INTERROG EVL PM/IDS: CPT | Performed by: INTERNAL MEDICINE

## 2024-05-15 PROCEDURE — 93294 REM INTERROG EVL PM/LDLS PM: CPT | Performed by: INTERNAL MEDICINE

## 2024-07-15 ENCOUNTER — TELEPHONE (OUTPATIENT)
Age: 84
End: 2024-07-15

## 2024-07-15 NOTE — TELEPHONE ENCOUNTER
Jeannine with [facility] called stating she needs :    Copy of patient pace maker info card , front/back    Please call / fax and advise.

## 2024-07-30 ENCOUNTER — TELEPHONE (OUTPATIENT)
Age: 84
End: 2024-07-30

## 2024-07-30 NOTE — TELEPHONE ENCOUNTER
Patient called due to light noted on her monitor. Merlin remote noting device is not connecting. Instructed the patient to call merlin.

## 2024-08-14 ENCOUNTER — HOSPITAL ENCOUNTER (OUTPATIENT)
Dept: GENERAL RADIOLOGY | Age: 84
Discharge: HOME | End: 2024-08-17
Payer: MEDICARE

## 2024-08-14 DIAGNOSIS — R93.7 ABNORMAL BONE RADIOGRAPH: ICD-10-CM

## 2024-08-14 PROCEDURE — 70260 X-RAY EXAM OF SKULL: CPT

## 2024-08-14 PROCEDURE — 73590 X-RAY EXAM OF LOWER LEG: CPT

## 2024-08-14 PROCEDURE — 73552 X-RAY EXAM OF FEMUR 2/>: CPT

## 2024-09-06 PROCEDURE — 93294 REM INTERROG EVL PM/LDLS PM: CPT | Performed by: INTERNAL MEDICINE

## 2024-09-06 PROCEDURE — 93296 REM INTERROG EVL PM/IDS: CPT | Performed by: INTERNAL MEDICINE

## 2024-11-14 ENCOUNTER — NURSE ONLY (OUTPATIENT)
Age: 84
End: 2024-11-14

## 2024-11-14 ENCOUNTER — OFFICE VISIT (OUTPATIENT)
Age: 84
End: 2024-11-14

## 2024-11-14 VITALS
HEIGHT: 59 IN | WEIGHT: 99 LBS | HEART RATE: 72 BPM | DIASTOLIC BLOOD PRESSURE: 60 MMHG | SYSTOLIC BLOOD PRESSURE: 98 MMHG | BODY MASS INDEX: 19.96 KG/M2

## 2024-11-14 DIAGNOSIS — Z95.0 PRESENCE OF CARDIAC PACEMAKER: ICD-10-CM

## 2024-11-14 DIAGNOSIS — I48.0 PAF (PAROXYSMAL ATRIAL FIBRILLATION) (HCC): Primary | ICD-10-CM

## 2024-11-14 DIAGNOSIS — I48.0 PAF (PAROXYSMAL ATRIAL FIBRILLATION) (HCC): ICD-10-CM

## 2024-11-14 DIAGNOSIS — I49.5 SSS (SICK SINUS SYNDROME) (HCC): Primary | ICD-10-CM

## 2024-11-14 NOTE — PROGRESS NOTES
[facility] CARDIOLOGY  [address] [place] [address], [place] [address]  PHONE: [phone]        24        NAME:  Jo Renee  : 1940  MRN: [id number]     Paf  Presence of a pacer    CHIEF COMPLAINT:    Atrial Fibrillation and Follow-up      SUBJECTIVE:     No chest pain or palpitation or dizziness.     Medications were all reviewed with the patient today and updated as necessary.   Current Outpatient Medications   Medication Sig    amitriptyline (ELAVIL) 25 MG tablet     pantoprazole (PROTONIX) 40 MG tablet Take 1 tablet by mouth every morning (before breakfast) (Patient taking differently: Take 1 tablet by mouth every morning (before breakfast) Pt Takes as Needed)    Cholecalciferol (VITAMIN D3) 125 MCG (5000 UT) TABS Take by mouth daily Unsure of mcg, takes daily in am    melatonin 5 MG TABS tablet Take 1 tablet by mouth nightly    pantoprazole (PROTONIX) 40 MG tablet  (Patient not taking: Reported on 2024)    Cholecalciferol (VITAMIN D3) 50 MCG (2000 UT) CAPS Take by mouth (Patient not taking: Reported on 2024)    Biotin 10 MG CAPS Take by mouth daily Pt unsure of mg, takes daily in am (Patient not taking: Reported on 2024)    albuterol (PROVENTIL) (2.5 MG/3ML) 0.083% nebulizer solution Inhale 2.5 mg into the lungs 4 times daily (Patient not taking: Reported on 2024)     No current facility-administered medications for this visit.        No Known Allergies        PHYSICAL EXAM:     Wt Readings from Last 3 Encounters:   24 44.9 kg (99 lb)   23 41.5 kg (91 lb 6.4 oz)   22 43.1 kg (95 lb)     BP Readings from Last 3 Encounters:   24 98/60   23 116/60   22 114/68       BP 98/60   Pulse 72   Ht 1.499 m (4' 11\")   Wt 44.9 kg (99 lb)   BMI 20.00 kg/m²     Physical Exam  Vitals reviewed.   HENT:      Head: Normocephalic and atraumatic.   Eyes:      Extraocular Movements: Extraocular movements intact.      Pupils: Pupils

## 2024-11-30 ENCOUNTER — HOSPITAL ENCOUNTER (EMERGENCY)
Age: 84
Discharge: HOME | End: 2024-11-30
Attending: GENERAL PRACTICE
Payer: MEDICARE

## 2024-11-30 VITALS
OXYGEN SATURATION: 98 % | DIASTOLIC BLOOD PRESSURE: 64 MMHG | WEIGHT: 100 LBS | RESPIRATION RATE: 26 BRPM | SYSTOLIC BLOOD PRESSURE: 102 MMHG | HEIGHT: 59 IN | BODY MASS INDEX: 20.16 KG/M2 | HEART RATE: 83 BPM | TEMPERATURE: 97.6 F

## 2024-11-30 DIAGNOSIS — R00.2 PALPITATIONS: Primary | ICD-10-CM

## 2024-11-30 LAB
ALBUMIN SERPL-MCNC: 3.5 G/DL (ref 3.2–4.6)
ALBUMIN/GLOB SERPL: 1.1 (ref 1–1.9)
ALP SERPL-CCNC: 93 U/L (ref 35–104)
ALT SERPL-CCNC: 14 U/L (ref 8–45)
ANION GAP SERPL CALC-SCNC: 10 MMOL/L (ref 7–16)
AST SERPL-CCNC: 22 U/L (ref 15–37)
BASOPHILS # BLD: 0 K/UL (ref 0–0.2)
BASOPHILS NFR BLD: 1 % (ref 0–2)
BILIRUB SERPL-MCNC: 0.2 MG/DL (ref 0–1.2)
BUN SERPL-MCNC: 15 MG/DL (ref 8–23)
CALCIUM SERPL-MCNC: 9.1 MG/DL (ref 8.8–10.2)
CHLORIDE SERPL-SCNC: 108 MMOL/L (ref 98–107)
CO2 SERPL-SCNC: 25 MMOL/L (ref 20–29)
CREAT SERPL-MCNC: 0.77 MG/DL (ref 0.6–1.1)
DIFFERENTIAL METHOD BLD: ABNORMAL
EKG ATRIAL RATE: 89 BPM
EKG DIAGNOSIS: NORMAL
EKG P AXIS: 76 DEGREES
EKG P-R INTERVAL: 154 MS
EKG Q-T INTERVAL: 338 MS
EKG QRS DURATION: 70 MS
EKG QTC CALCULATION (BAZETT): 411 MS
EKG R AXIS: 79 DEGREES
EKG T AXIS: 46 DEGREES
EKG VENTRICULAR RATE: 89 BPM
EOSINOPHIL # BLD: 0.3 K/UL (ref 0–0.8)
EOSINOPHIL NFR BLD: 5 % (ref 0.5–7.8)
ERYTHROCYTE [DISTWIDTH] IN BLOOD BY AUTOMATED COUNT: 14.7 % (ref 11.9–14.6)
GLOBULIN SER CALC-MCNC: 3.3 G/DL (ref 2.3–3.5)
GLUCOSE SERPL-MCNC: 87 MG/DL (ref 70–99)
HCT VFR BLD AUTO: 43.7 % (ref 35.8–46.3)
HGB BLD-MCNC: 13.7 G/DL (ref 11.7–15.4)
IMM GRANULOCYTES # BLD AUTO: 0 K/UL (ref 0–0.5)
IMM GRANULOCYTES NFR BLD AUTO: 0 % (ref 0–5)
LYMPHOCYTES # BLD: 1.5 K/UL (ref 0.5–4.6)
LYMPHOCYTES NFR BLD: 24 % (ref 13–44)
MAGNESIUM SERPL-MCNC: 2.1 MG/DL (ref 1.8–2.4)
MCH RBC QN AUTO: 27.6 PG (ref 26.1–32.9)
MCHC RBC AUTO-ENTMCNC: 31.4 G/DL (ref 31.4–35)
MCV RBC AUTO: 87.9 FL (ref 82–102)
MONOCYTES # BLD: 0.6 K/UL (ref 0.1–1.3)
MONOCYTES NFR BLD: 10 % (ref 4–12)
NEUTS SEG # BLD: 3.7 K/UL (ref 1.7–8.2)
NEUTS SEG NFR BLD: 60 % (ref 43–78)
NRBC # BLD: 0 K/UL (ref 0–0.2)
PLATELET # BLD AUTO: 336 K/UL (ref 150–450)
PMV BLD AUTO: 9.9 FL (ref 9.4–12.3)
POTASSIUM SERPL-SCNC: 4.2 MMOL/L (ref 3.5–5.1)
PROT SERPL-MCNC: 6.8 G/DL (ref 6.3–8.2)
RBC # BLD AUTO: 4.97 M/UL (ref 4.05–5.2)
SODIUM SERPL-SCNC: 143 MMOL/L (ref 136–145)
WBC # BLD AUTO: 6.1 K/UL (ref 4.3–11.1)

## 2024-11-30 PROCEDURE — 93010 ELECTROCARDIOGRAM REPORT: CPT | Performed by: INTERNAL MEDICINE

## 2024-11-30 PROCEDURE — 99284 EMERGENCY DEPT VISIT MOD MDM: CPT

## 2024-11-30 PROCEDURE — 83735 ASSAY OF MAGNESIUM: CPT

## 2024-11-30 PROCEDURE — 85025 COMPLETE CBC W/AUTO DIFF WBC: CPT

## 2024-11-30 PROCEDURE — 93005 ELECTROCARDIOGRAM TRACING: CPT | Performed by: GENERAL PRACTICE

## 2024-11-30 PROCEDURE — 80053 COMPREHEN METABOLIC PANEL: CPT

## 2024-11-30 ASSESSMENT — LIFESTYLE VARIABLES
HOW OFTEN DO YOU HAVE A DRINK CONTAINING ALCOHOL: NEVER
HOW MANY STANDARD DRINKS CONTAINING ALCOHOL DO YOU HAVE ON A TYPICAL DAY: PATIENT DOES NOT DRINK

## 2024-11-30 ASSESSMENT — PAIN - FUNCTIONAL ASSESSMENT: PAIN_FUNCTIONAL_ASSESSMENT: NONE - DENIES PAIN

## 2024-11-30 NOTE — DISCHARGE SUMMARY
I have reviewed discharge instructions with the patient.  The patient verbalized understanding.    Patient left ED via Discharge Method: ambulatory to Home with family.    Opportunity for questions and clarification provided.       Patient given 0 scripts.         To continue your aftercare when you leave the hospital, you may receive an automated call from our care team to check in on how you are doing.  This is a free service and part of our promise to provide the best care and service to meet your aftercare needs.” If you have questions, or wish to unsubscribe from this service please call [phone].  Thank you for Choosing our [facility] Emergency Department.

## 2024-11-30 NOTE — ED TRIAGE NOTES
Pt arrives to the ER with c/o increased HR stating her watch has indicated she has been seeing intermittent HR as high as 170's in the past 24 hours. Pt denies chest pain or shob. Pt hx of A-fib, pacemaker.

## 2024-11-30 NOTE — ED PROVIDER NOTES
Emergency Department Provider Note       PCP: Robert Caban MD   Age: 84 y.o.   Sex: female     DISPOSITION Decision To Discharge 11/30/2024 12:48:09 PM            ICD-10-CM    1. Palpitations  R00.2           Medical Decision Making     Patient presents with palpitations.  Same after Apple Watch with stable heart rate going up in the 170s at times.  Patient remained asymptomatic here.  Patient remained 78-80 on the monitor.  Blood pressures are borderline.  Patient only weighs 45 kg.  Patient has remained stable otherwise and has no other symptoms.  She is eager to be discharged to watch the [employer] football game.  I will go ahead and have the patient follow-up with her cardiologist next week.  She is given strict return precautions.  Nothing to suggest severe dysrhythmia, PE, CHF, ACS, or any other cardiopulmonary emergency.     1 or more acute illnesses that pose a threat to life or bodily function.   Chronic medical problems impacting care include paroxysmal atrial fibrillation.  Shared medical decision making was utilized in creating the patients health plan today.    I independently ordered and reviewed each unique test.  I reviewed external records: provider visit note from outside specialist.  I reviewed external records: previous EKG including cardiologist interpretation.         My Independent EKG Interpretation: sinus rhythm, no evidence of arrhythmia      ST Segments:Normal ST segments - NO STEMI   Rate: 89            History     Patient presents with irregular heart rate.  She has felt palpitations this morning and her watch has been stating that her heart rate is gone up to 170s.  She does feel a little lightheaded.  No syncope or near syncope.  No chest pain no shortness of breath.  She does have history of H fibrillation.  Does have pacemaker.  Does not take blood thinners or rate control medicine.  She says she is 1 of those \"non-medicine people \".        ROS     Review of Systems   All

## 2024-12-02 ENCOUNTER — TELEPHONE (OUTPATIENT)
Age: 84
End: 2024-12-02

## 2024-12-02 NOTE — TELEPHONE ENCOUNTER
Pt is calling to speak with Dr. Ministerio CHEUNG. Pt didn't state why she is calling. Please reach back out to the pt.

## 2024-12-03 NOTE — TELEPHONE ENCOUNTER
Patient states she was in the hospital 11/30 because she went into afib.     Pt asking if Dr. Mandel needs to see her back in the office?   Also having sob on exertion.

## 2024-12-14 NOTE — PROGRESS NOTES
[facility] CARDIOLOGY  [address] [place] [address], [address]  [place] [address]  PHONE: [phone]        24        NAME:  Jo Renee  : 1940  MRN: [id number]     Paf  Presence of a pacer, sub pectoral     CHIEF COMPLAINT:    Atrial Fibrillation      SUBJECTIVE:     Recent ER visit for palpitation and rapid HR on her Apple Watch.  Awakened early AM w/ tachycardia. x 4 hr.  P also concerned about BLANCA.       Medications were all reviewed with the patient today and updated as necessary.   Current Outpatient Medications   Medication Sig    metoprolol tartrate (LOPRESSOR) 25 MG tablet Take two tabs once prn    amitriptyline (ELAVIL) 25 MG tablet Take 1 tablet by mouth nightly    pantoprazole (PROTONIX) 40 MG tablet Take 1 tablet by mouth every morning (before breakfast) (Patient taking differently: Take 1 tablet by mouth every morning (before breakfast) Pt Takes as Needed)    Cholecalciferol (VITAMIN D3) 125 MCG (5000 UT) TABS Take by mouth daily Unsure of mcg, takes daily in am    melatonin 5 MG TABS tablet Take 1 tablet by mouth nightly     No current facility-administered medications for this visit.        No Known Allergies        PHYSICAL EXAM:     Wt Readings from Last 3 Encounters:   24 45.4 kg (100 lb)   24 45.4 kg (100 lb)   24 44.9 kg (99 lb)     BP Readings from Last 3 Encounters:   24 124/60   24 102/64   24 98/60       /60   Pulse 82   Ht 1.499 m (4' 11\")   Wt 45.4 kg (100 lb)   BMI 20.20 kg/m²     Physical Exam  Vitals reviewed.   HENT:      Head: Normocephalic and atraumatic.   Eyes:      Extraocular Movements: Extraocular movements intact.      Pupils: Pupils are equal, round, and reactive to light.   Cardiovascular:      Rate and Rhythm: Normal rate.      Heart sounds: Normal heart sounds.   Pulmonary:      Effort: Pulmonary effort is normal.      Breath sounds: Normal breath sounds.   Abdominal:      General: Abdomen is flat.

## 2024-12-16 ENCOUNTER — OFFICE VISIT (OUTPATIENT)
Age: 84
End: 2024-12-16
Payer: MEDICARE

## 2024-12-16 VITALS
DIASTOLIC BLOOD PRESSURE: 60 MMHG | HEIGHT: 59 IN | SYSTOLIC BLOOD PRESSURE: 124 MMHG | BODY MASS INDEX: 20.16 KG/M2 | HEART RATE: 82 BPM | WEIGHT: 100 LBS

## 2024-12-16 DIAGNOSIS — R06.09 DOE (DYSPNEA ON EXERTION): ICD-10-CM

## 2024-12-16 DIAGNOSIS — I48.0 PAF (PAROXYSMAL ATRIAL FIBRILLATION) (HCC): Primary | ICD-10-CM

## 2024-12-16 DIAGNOSIS — I48.0 PAROXYSMAL ATRIAL FIBRILLATION (HCC): ICD-10-CM

## 2024-12-16 PROCEDURE — 1123F ACP DISCUSS/DSCN MKR DOCD: CPT | Performed by: INTERNAL MEDICINE

## 2024-12-16 PROCEDURE — 1090F PRES/ABSN URINE INCON ASSESS: CPT | Performed by: INTERNAL MEDICINE

## 2024-12-16 PROCEDURE — G8428 CUR MEDS NOT DOCUMENT: HCPCS | Performed by: INTERNAL MEDICINE

## 2024-12-16 PROCEDURE — 1036F TOBACCO NON-USER: CPT | Performed by: INTERNAL MEDICINE

## 2024-12-16 PROCEDURE — 99214 OFFICE O/P EST MOD 30 MIN: CPT | Performed by: INTERNAL MEDICINE

## 2024-12-16 PROCEDURE — G8399 PT W/DXA RESULTS DOCUMENT: HCPCS | Performed by: INTERNAL MEDICINE

## 2024-12-16 PROCEDURE — G8420 CALC BMI NORM PARAMETERS: HCPCS | Performed by: INTERNAL MEDICINE

## 2024-12-16 PROCEDURE — G8484 FLU IMMUNIZE NO ADMIN: HCPCS | Performed by: INTERNAL MEDICINE

## 2024-12-16 PROCEDURE — 1126F AMNT PAIN NOTED NONE PRSNT: CPT | Performed by: INTERNAL MEDICINE

## 2024-12-16 RX ORDER — METOPROLOL TARTRATE 25 MG/1
TABLET, FILM COATED ORAL
Qty: 10 TABLET | Refills: 3 | Status: SHIPPED | OUTPATIENT
Start: 2024-12-16

## 2024-12-17 LAB — NT PRO BNP: 452 PG/ML (ref 0–450)

## 2025-01-28 ENCOUNTER — TELEPHONE (OUTPATIENT)
Age: 85
End: 2025-01-28

## 2025-01-28 NOTE — TELEPHONE ENCOUNTER
Called patient back and informed her of echo approved authorization status. Also check the status for her pending NST PA request, it has been approved. Updated referral, unfortunately at this time patient has the next available NST.

## 2025-02-09 ENCOUNTER — APPOINTMENT (OUTPATIENT)
Dept: CT IMAGING | Age: 85
DRG: 069 | End: 2025-02-09
Payer: MEDICARE

## 2025-02-09 ENCOUNTER — HOSPITAL ENCOUNTER (INPATIENT)
Age: 85
LOS: 2 days | Discharge: HOME HEALTH | DRG: 069 | End: 2025-02-13
Attending: EMERGENCY MEDICINE | Admitting: INTERNAL MEDICINE
Payer: MEDICARE

## 2025-02-09 ENCOUNTER — APPOINTMENT (OUTPATIENT)
Dept: GENERAL RADIOLOGY | Age: 85
DRG: 069 | End: 2025-02-09
Payer: MEDICARE

## 2025-02-09 DIAGNOSIS — J10.1 INFLUENZA A: ICD-10-CM

## 2025-02-09 DIAGNOSIS — R26.81 GAIT INSTABILITY: ICD-10-CM

## 2025-02-09 DIAGNOSIS — U07.1 COVID-19: ICD-10-CM

## 2025-02-09 DIAGNOSIS — G45.9 TIA (TRANSIENT ISCHEMIC ATTACK): Primary | ICD-10-CM

## 2025-02-09 PROBLEM — K52.9 CHRONIC DIARRHEA: Status: ACTIVE | Noted: 2025-02-09

## 2025-02-09 PROBLEM — Q74.2 ABNORMALITY OF FEMUR: Status: ACTIVE | Noted: 2025-02-09

## 2025-02-09 PROBLEM — K21.9 CHRONIC GERD: Status: ACTIVE | Noted: 2025-02-09

## 2025-02-09 PROBLEM — R19.5 ABNORMAL STOOL TEST: Status: ACTIVE | Noted: 2025-02-09

## 2025-02-09 PROBLEM — K59.09 CHRONIC CONSTIPATION: Status: ACTIVE | Noted: 2025-02-09

## 2025-02-09 PROBLEM — M25.529 ELBOW PAIN: Status: ACTIVE | Noted: 2018-09-11

## 2025-02-09 PROBLEM — R93.5 ABNORMAL ABDOMINAL CT SCAN: Status: ACTIVE | Noted: 2025-02-09

## 2025-02-09 PROBLEM — E07.9 THYROID DISEASE: Status: ACTIVE | Noted: 2025-02-09

## 2025-02-09 PROBLEM — D68.69 OTHER THROMBOPHILIA: Status: ACTIVE | Noted: 2025-02-09

## 2025-02-09 PROBLEM — R07.89 OTHER CHEST PAIN: Status: ACTIVE | Noted: 2018-03-14

## 2025-02-09 PROBLEM — K86.89 PANCREATIC MASS: Status: ACTIVE | Noted: 2025-02-09

## 2025-02-09 PROBLEM — R93.89 ABNORMAL FINDINGS ON DIAGNOSTIC IMAGING OF OTHER SPECIFIED BODY STRUCTURES: Status: ACTIVE | Noted: 2025-02-09

## 2025-02-09 PROBLEM — I50.9 ACC/AHA STAGE B HEART FAILURE (HCC): Status: ACTIVE | Noted: 2023-07-24

## 2025-02-09 PROBLEM — R19.7 OVERFLOW DIARRHEA: Status: ACTIVE | Noted: 2025-02-09

## 2025-02-09 PROBLEM — R14.0 ABDOMINAL DISTENSION: Status: ACTIVE | Noted: 2025-02-09

## 2025-02-09 PROBLEM — R15.9 COMPLETE FECAL INCONTINENCE: Status: ACTIVE | Noted: 2025-02-09

## 2025-02-09 PROBLEM — I26.99 ACUTE PULMONARY THROMBOEMBOLISM (HCC): Status: ACTIVE | Noted: 2025-02-09

## 2025-02-09 LAB
ALBUMIN SERPL-MCNC: 3.4 G/DL (ref 3.2–4.6)
ALBUMIN/GLOB SERPL: 1.1 (ref 1–1.9)
ALP SERPL-CCNC: 77 U/L (ref 35–104)
ALT SERPL-CCNC: 15 U/L (ref 8–45)
ANION GAP SERPL CALC-SCNC: 13 MMOL/L (ref 7–16)
APTT PPP: 40.2 SEC (ref 23.3–37.4)
AST SERPL-CCNC: 27 U/L (ref 15–37)
BASOPHILS # BLD: 0.03 K/UL (ref 0–0.2)
BASOPHILS NFR BLD: 0.4 % (ref 0–2)
BILIRUB SERPL-MCNC: 0.2 MG/DL (ref 0–1.2)
BUN SERPL-MCNC: 11 MG/DL (ref 8–23)
CALCIUM SERPL-MCNC: 8.6 MG/DL (ref 8.8–10.2)
CHLORIDE SERPL-SCNC: 99 MMOL/L (ref 98–107)
CO2 SERPL-SCNC: 22 MMOL/L (ref 20–29)
CREAT SERPL-MCNC: 0.75 MG/DL (ref 0.6–1.1)
DIFFERENTIAL METHOD BLD: ABNORMAL
EOSINOPHIL # BLD: 0 K/UL (ref 0–0.8)
EOSINOPHIL NFR BLD: 0 % (ref 0.5–7.8)
ERYTHROCYTE [DISTWIDTH] IN BLOOD BY AUTOMATED COUNT: 15.2 % (ref 11.9–14.6)
ERYTHROCYTE [DISTWIDTH] IN BLOOD BY AUTOMATED COUNT: 15.4 % (ref 11.9–14.6)
FLUAV RNA SPEC QL NAA+PROBE: DETECTED
FLUBV RNA SPEC QL NAA+PROBE: NOT DETECTED
GLOBULIN SER CALC-MCNC: 3.2 G/DL (ref 2.3–3.5)
GLUCOSE SERPL-MCNC: 105 MG/DL (ref 70–99)
HCT VFR BLD AUTO: 33.8 % (ref 35.8–46.3)
HCT VFR BLD AUTO: 36.8 % (ref 35.8–46.3)
HGB BLD-MCNC: 10.8 G/DL (ref 11.7–15.4)
HGB BLD-MCNC: 12.1 G/DL (ref 11.7–15.4)
IMM GRANULOCYTES # BLD AUTO: 0.08 K/UL (ref 0–0.5)
IMM GRANULOCYTES NFR BLD AUTO: 1 % (ref 0–5)
INR PPP: 1.3
LACTATE SERPL-SCNC: 1.2 MMOL/L (ref 0.5–2)
LACTATE SERPL-SCNC: 1.4 MMOL/L (ref 0.5–2)
LYMPHOCYTES # BLD: 0.6 K/UL (ref 0.5–4.6)
LYMPHOCYTES NFR BLD: 7.5 % (ref 13–44)
MAGNESIUM SERPL-MCNC: 1.9 MG/DL (ref 1.8–2.4)
MCH RBC QN AUTO: 27 PG (ref 26.1–32.9)
MCH RBC QN AUTO: 27.3 PG (ref 26.1–32.9)
MCHC RBC AUTO-ENTMCNC: 32 G/DL (ref 31.4–35)
MCHC RBC AUTO-ENTMCNC: 32.9 G/DL (ref 31.4–35)
MCV RBC AUTO: 82.1 FL (ref 82–102)
MCV RBC AUTO: 85.6 FL (ref 82–102)
MONOCYTES # BLD: 0.49 K/UL (ref 0.1–1.3)
MONOCYTES NFR BLD: 6.1 % (ref 4–12)
NEUTS SEG # BLD: 6.84 K/UL (ref 1.7–8.2)
NEUTS SEG NFR BLD: 85 % (ref 43–78)
NRBC # BLD: 0 K/UL (ref 0–0.2)
NRBC # BLD: 0 K/UL (ref 0–0.2)
PLATELET # BLD AUTO: 177 K/UL (ref 150–450)
PLATELET # BLD AUTO: 208 K/UL (ref 150–450)
PMV BLD AUTO: 10.6 FL (ref 9.4–12.3)
PMV BLD AUTO: 10.7 FL (ref 9.4–12.3)
POTASSIUM SERPL-SCNC: 3.4 MMOL/L (ref 3.5–5.1)
PROCALCITONIN SERPL-MCNC: 0.1 NG/ML (ref 0–0.1)
PROT SERPL-MCNC: 6.6 G/DL (ref 6.3–8.2)
PROTHROMBIN TIME: 16.5 SEC (ref 11.3–14.9)
RBC # BLD AUTO: 3.95 M/UL (ref 4.05–5.2)
RBC # BLD AUTO: 4.48 M/UL (ref 4.05–5.2)
SARS-COV-2 RDRP RESP QL NAA+PROBE: DETECTED
SODIUM SERPL-SCNC: 134 MMOL/L (ref 136–145)
SOURCE: ABNORMAL
UFH PPP CHRO-ACNC: 0.36 IU/ML (ref 0.3–0.7)
WBC # BLD AUTO: 7.1 K/UL (ref 4.3–11.1)
WBC # BLD AUTO: 8 K/UL (ref 4.3–11.1)

## 2025-02-09 PROCEDURE — 99285 EMERGENCY DEPT VISIT HI MDM: CPT

## 2025-02-09 PROCEDURE — 93005 ELECTROCARDIOGRAM TRACING: CPT | Performed by: NURSE PRACTITIONER

## 2025-02-09 PROCEDURE — G0378 HOSPITAL OBSERVATION PER HR: HCPCS

## 2025-02-09 PROCEDURE — 87635 SARS-COV-2 COVID-19 AMP PRB: CPT

## 2025-02-09 PROCEDURE — 2580000003 HC RX 258: Performed by: CASE MANAGER/CARE COORDINATOR

## 2025-02-09 PROCEDURE — 87040 BLOOD CULTURE FOR BACTERIA: CPT

## 2025-02-09 PROCEDURE — 96366 THER/PROPH/DIAG IV INF ADDON: CPT

## 2025-02-09 PROCEDURE — 85025 COMPLETE CBC W/AUTO DIFF WBC: CPT

## 2025-02-09 PROCEDURE — 36415 COLL VENOUS BLD VENIPUNCTURE: CPT

## 2025-02-09 PROCEDURE — 80053 COMPREHEN METABOLIC PANEL: CPT

## 2025-02-09 PROCEDURE — 6360000002 HC RX W HCPCS: Performed by: CASE MANAGER/CARE COORDINATOR

## 2025-02-09 PROCEDURE — 71046 X-RAY EXAM CHEST 2 VIEWS: CPT

## 2025-02-09 PROCEDURE — 6370000000 HC RX 637 (ALT 250 FOR IP)

## 2025-02-09 PROCEDURE — 83605 ASSAY OF LACTIC ACID: CPT

## 2025-02-09 PROCEDURE — 87502 INFLUENZA DNA AMP PROBE: CPT

## 2025-02-09 PROCEDURE — 96361 HYDRATE IV INFUSION ADD-ON: CPT

## 2025-02-09 PROCEDURE — 6360000004 HC RX CONTRAST MEDICATION

## 2025-02-09 PROCEDURE — 83735 ASSAY OF MAGNESIUM: CPT

## 2025-02-09 PROCEDURE — 85027 COMPLETE CBC AUTOMATED: CPT

## 2025-02-09 PROCEDURE — 96375 TX/PRO/DX INJ NEW DRUG ADDON: CPT

## 2025-02-09 PROCEDURE — 6360000002 HC RX W HCPCS

## 2025-02-09 PROCEDURE — 96365 THER/PROPH/DIAG IV INF INIT: CPT

## 2025-02-09 PROCEDURE — 85520 HEPARIN ASSAY: CPT

## 2025-02-09 PROCEDURE — 70498 CT ANGIOGRAPHY NECK: CPT

## 2025-02-09 PROCEDURE — 96372 THER/PROPH/DIAG INJ SC/IM: CPT

## 2025-02-09 PROCEDURE — 84145 PROCALCITONIN (PCT): CPT

## 2025-02-09 PROCEDURE — 2580000003 HC RX 258: Performed by: NURSE PRACTITIONER

## 2025-02-09 PROCEDURE — 85730 THROMBOPLASTIN TIME PARTIAL: CPT

## 2025-02-09 PROCEDURE — 70450 CT HEAD/BRAIN W/O DYE: CPT

## 2025-02-09 PROCEDURE — 93005 ELECTROCARDIOGRAM TRACING: CPT | Performed by: EMERGENCY MEDICINE

## 2025-02-09 PROCEDURE — 2500000003 HC RX 250 WO HCPCS

## 2025-02-09 PROCEDURE — 96368 THER/DIAG CONCURRENT INF: CPT

## 2025-02-09 PROCEDURE — 2500000003 HC RX 250 WO HCPCS: Performed by: CASE MANAGER/CARE COORDINATOR

## 2025-02-09 PROCEDURE — 85610 PROTHROMBIN TIME: CPT

## 2025-02-09 RX ORDER — HEPARIN SODIUM 1000 [USP'U]/ML
60 INJECTION, SOLUTION INTRAVENOUS; SUBCUTANEOUS PRN
Status: DISCONTINUED | OUTPATIENT
Start: 2025-02-09 | End: 2025-02-10

## 2025-02-09 RX ORDER — HEPARIN SODIUM 10000 [USP'U]/100ML
5-30 INJECTION, SOLUTION INTRAVENOUS CONTINUOUS
Status: DISCONTINUED | OUTPATIENT
Start: 2025-02-09 | End: 2025-02-10

## 2025-02-09 RX ORDER — POTASSIUM CHLORIDE 1500 MG/1
40 TABLET, EXTENDED RELEASE ORAL PRN
Status: DISCONTINUED | OUTPATIENT
Start: 2025-02-09 | End: 2025-02-13 | Stop reason: HOSPADM

## 2025-02-09 RX ORDER — AMITRIPTYLINE HYDROCHLORIDE 50 MG/1
25 TABLET ORAL NIGHTLY
Status: DISCONTINUED | OUTPATIENT
Start: 2025-02-09 | End: 2025-02-13 | Stop reason: HOSPADM

## 2025-02-09 RX ORDER — POTASSIUM CHLORIDE 7.45 MG/ML
10 INJECTION INTRAVENOUS ONCE
Status: COMPLETED | OUTPATIENT
Start: 2025-02-09 | End: 2025-02-09

## 2025-02-09 RX ORDER — ONDANSETRON 2 MG/ML
4 INJECTION INTRAMUSCULAR; INTRAVENOUS EVERY 6 HOURS PRN
Status: DISCONTINUED | OUTPATIENT
Start: 2025-02-09 | End: 2025-02-13 | Stop reason: HOSPADM

## 2025-02-09 RX ORDER — SODIUM CHLORIDE 9 MG/ML
INJECTION, SOLUTION INTRAVENOUS PRN
Status: DISCONTINUED | OUTPATIENT
Start: 2025-02-09 | End: 2025-02-13 | Stop reason: HOSPADM

## 2025-02-09 RX ORDER — IOPAMIDOL 755 MG/ML
100 INJECTION, SOLUTION INTRAVASCULAR
Status: COMPLETED | OUTPATIENT
Start: 2025-02-09 | End: 2025-02-09

## 2025-02-09 RX ORDER — SODIUM CHLORIDE 0.9 % (FLUSH) 0.9 %
5-40 SYRINGE (ML) INJECTION EVERY 12 HOURS SCHEDULED
Status: DISCONTINUED | OUTPATIENT
Start: 2025-02-09 | End: 2025-02-13 | Stop reason: HOSPADM

## 2025-02-09 RX ORDER — POLYETHYLENE GLYCOL 3350 17 G/17G
17 POWDER, FOR SOLUTION ORAL DAILY PRN
Status: DISCONTINUED | OUTPATIENT
Start: 2025-02-09 | End: 2025-02-13 | Stop reason: HOSPADM

## 2025-02-09 RX ORDER — SODIUM CHLORIDE 9 MG/ML
INJECTION, SOLUTION INTRAVENOUS CONTINUOUS
Status: ACTIVE | OUTPATIENT
Start: 2025-02-10 | End: 2025-02-10

## 2025-02-09 RX ORDER — GUAIFENESIN/DEXTROMETHORPHAN 100-10MG/5
5 SYRUP ORAL EVERY 4 HOURS PRN
Status: DISCONTINUED | OUTPATIENT
Start: 2025-02-09 | End: 2025-02-13 | Stop reason: HOSPADM

## 2025-02-09 RX ORDER — POTASSIUM CHLORIDE 1500 MG/1
40 TABLET, EXTENDED RELEASE ORAL ONCE
Status: COMPLETED | OUTPATIENT
Start: 2025-02-09 | End: 2025-02-09

## 2025-02-09 RX ORDER — ONDANSETRON 4 MG/1
4 TABLET, ORALLY DISINTEGRATING ORAL EVERY 8 HOURS PRN
Status: DISCONTINUED | OUTPATIENT
Start: 2025-02-09 | End: 2025-02-13 | Stop reason: HOSPADM

## 2025-02-09 RX ORDER — METOPROLOL TARTRATE 25 MG/1
25 TABLET, FILM COATED ORAL DAILY
Status: DISCONTINUED | OUTPATIENT
Start: 2025-02-10 | End: 2025-02-10

## 2025-02-09 RX ORDER — 0.9 % SODIUM CHLORIDE 0.9 %
500 INTRAVENOUS SOLUTION INTRAVENOUS ONCE
Status: COMPLETED | OUTPATIENT
Start: 2025-02-09 | End: 2025-02-09

## 2025-02-09 RX ORDER — 0.9 % SODIUM CHLORIDE 0.9 %
500 INTRAVENOUS SOLUTION INTRAVENOUS
Status: COMPLETED | OUTPATIENT
Start: 2025-02-10 | End: 2025-02-10

## 2025-02-09 RX ORDER — HEPARIN SODIUM 1000 [USP'U]/ML
30 INJECTION, SOLUTION INTRAVENOUS; SUBCUTANEOUS PRN
Status: DISCONTINUED | OUTPATIENT
Start: 2025-02-09 | End: 2025-02-10

## 2025-02-09 RX ORDER — ACETAMINOPHEN 500 MG
1000 TABLET ORAL
Status: COMPLETED | OUTPATIENT
Start: 2025-02-09 | End: 2025-02-09

## 2025-02-09 RX ORDER — ROSUVASTATIN CALCIUM 20 MG/1
40 TABLET, COATED ORAL NIGHTLY
Status: DISCONTINUED | OUTPATIENT
Start: 2025-02-09 | End: 2025-02-13

## 2025-02-09 RX ORDER — MAGNESIUM SULFATE IN WATER 40 MG/ML
2000 INJECTION, SOLUTION INTRAVENOUS PRN
Status: DISCONTINUED | OUTPATIENT
Start: 2025-02-09 | End: 2025-02-13 | Stop reason: HOSPADM

## 2025-02-09 RX ORDER — ENOXAPARIN SODIUM 100 MG/ML
30 INJECTION SUBCUTANEOUS EVERY EVENING
Status: DISCONTINUED | OUTPATIENT
Start: 2025-02-09 | End: 2025-02-09

## 2025-02-09 RX ORDER — PANTOPRAZOLE SODIUM 40 MG/1
40 TABLET, DELAYED RELEASE ORAL
Status: DISCONTINUED | OUTPATIENT
Start: 2025-02-10 | End: 2025-02-13 | Stop reason: HOSPADM

## 2025-02-09 RX ORDER — POTASSIUM CHLORIDE 7.45 MG/ML
10 INJECTION INTRAVENOUS PRN
Status: DISCONTINUED | OUTPATIENT
Start: 2025-02-09 | End: 2025-02-13 | Stop reason: HOSPADM

## 2025-02-09 RX ORDER — SODIUM CHLORIDE 0.9 % (FLUSH) 0.9 %
5-40 SYRINGE (ML) INJECTION PRN
Status: DISCONTINUED | OUTPATIENT
Start: 2025-02-09 | End: 2025-02-13 | Stop reason: HOSPADM

## 2025-02-09 RX ORDER — HEPARIN SODIUM 1000 [USP'U]/ML
60 INJECTION, SOLUTION INTRAVENOUS; SUBCUTANEOUS ONCE
Status: COMPLETED | OUTPATIENT
Start: 2025-02-09 | End: 2025-02-09

## 2025-02-09 RX ORDER — ASPIRIN 81 MG/1
81 TABLET, CHEWABLE ORAL DAILY
Status: DISCONTINUED | OUTPATIENT
Start: 2025-02-09 | End: 2025-02-13

## 2025-02-09 RX ORDER — MAGNESIUM SULFATE IN WATER 40 MG/ML
2000 INJECTION, SOLUTION INTRAVENOUS ONCE
Status: COMPLETED | OUTPATIENT
Start: 2025-02-09 | End: 2025-02-09

## 2025-02-09 RX ORDER — ASPIRIN 300 MG/1
300 SUPPOSITORY RECTAL DAILY
Status: DISCONTINUED | OUTPATIENT
Start: 2025-02-09 | End: 2025-02-13

## 2025-02-09 RX ORDER — DILTIAZEM HYDROCHLORIDE 5 MG/ML
5 INJECTION INTRAVENOUS
Status: COMPLETED | OUTPATIENT
Start: 2025-02-09 | End: 2025-02-09

## 2025-02-09 RX ADMIN — SODIUM CHLORIDE, PRESERVATIVE FREE 10 ML: 5 INJECTION INTRAVENOUS at 20:00

## 2025-02-09 RX ADMIN — ACETAMINOPHEN 1000 MG: 500 TABLET, FILM COATED ORAL at 12:32

## 2025-02-09 RX ADMIN — ENOXAPARIN SODIUM 30 MG: 100 INJECTION SUBCUTANEOUS at 18:05

## 2025-02-09 RX ADMIN — MAGNESIUM SULFATE HEPTAHYDRATE 2000 MG: 40 INJECTION, SOLUTION INTRAVENOUS at 21:19

## 2025-02-09 RX ADMIN — DILTIAZEM HYDROCHLORIDE 5 MG: 5 INJECTION, SOLUTION INTRAVENOUS at 19:47

## 2025-02-09 RX ADMIN — SODIUM CHLORIDE 500 ML: 9 INJECTION, SOLUTION INTRAVENOUS at 20:03

## 2025-02-09 RX ADMIN — POTASSIUM CHLORIDE 10 MEQ: 7.46 INJECTION, SOLUTION INTRAVENOUS at 19:59

## 2025-02-09 RX ADMIN — SODIUM CHLORIDE 500 ML: 9 INJECTION, SOLUTION INTRAVENOUS at 18:05

## 2025-02-09 RX ADMIN — AMITRIPTYLINE HYDROCHLORIDE 25 MG: 50 TABLET ORAL at 20:36

## 2025-02-09 RX ADMIN — HEPARIN SODIUM 2600 UNITS: 1000 INJECTION INTRAVENOUS; SUBCUTANEOUS at 20:29

## 2025-02-09 RX ADMIN — ASPIRIN 81 MG: 81 TABLET, CHEWABLE ORAL at 18:05

## 2025-02-09 RX ADMIN — ROSUVASTATIN CALCIUM 40 MG: 20 TABLET, FILM COATED ORAL at 20:36

## 2025-02-09 RX ADMIN — Medication 4.5 MG: at 20:36

## 2025-02-09 RX ADMIN — POTASSIUM CHLORIDE 40 MEQ: 1500 TABLET, EXTENDED RELEASE ORAL at 18:34

## 2025-02-09 RX ADMIN — HEPARIN SODIUM 12 UNITS/KG/HR: 10000 INJECTION, SOLUTION INTRAVENOUS at 20:33

## 2025-02-09 RX ADMIN — IOPAMIDOL 100 ML: 755 INJECTION, SOLUTION INTRAVENOUS at 11:38

## 2025-02-09 ASSESSMENT — LIFESTYLE VARIABLES
HOW MANY STANDARD DRINKS CONTAINING ALCOHOL DO YOU HAVE ON A TYPICAL DAY: PATIENT DOES NOT DRINK
HOW OFTEN DO YOU HAVE A DRINK CONTAINING ALCOHOL: NEVER

## 2025-02-09 ASSESSMENT — PAIN - FUNCTIONAL ASSESSMENT
PAIN_FUNCTIONAL_ASSESSMENT: NONE - DENIES PAIN
PAIN_FUNCTIONAL_ASSESSMENT: NONE - DENIES PAIN

## 2025-02-09 NOTE — PROGRESS NOTES
Patient arrived from ED with a pulse in the 160's and a BP of 81/68.  Patient asymptomatic.  Hospitalist notified, orders received for stat EKG, 500 ml fluid bolus, and cardiology consult.

## 2025-02-09 NOTE — H&P
speech.  Psych:  Normal mood and affect.      Orders Placed This Encounter   Medications    acetaminophen (TYLENOL) tablet 1,000 mg    iopamidol (ISOVUE-370) 76 % injection 100 mL    amitriptyline (ELAVIL) tablet 25 mg    metoprolol tartrate (LOPRESSOR) tablet 25 mg    pantoprazole (PROTONIX) tablet 40 mg    melatonin tablet 4.5 mg    sodium chloride flush 0.9 % injection 5-40 mL    sodium chloride flush 0.9 % injection 5-40 mL    0.9 % sodium chloride infusion    OR Linked Order Group     ondansetron (ZOFRAN-ODT) disintegrating tablet 4 mg     ondansetron (ZOFRAN) injection 4 mg    polyethylene glycol (GLYCOLAX) packet 17 g    enoxaparin Sodium (LOVENOX) injection 30 mg     Order Specific Question:   Indication of Use     Answer:   Prophylaxis-DVT/PE    rosuvastatin (CRESTOR) tablet 40 mg    OR Linked Order Group     aspirin chewable tablet 81 mg     aspirin suppository 300 mg    guaiFENesin-dextromethorphan (ROBITUSSIN DM) 100-10 MG/5ML syrup 5 mL       Prior to Admit Medications:  Current Outpatient Medications   Medication Instructions    amitriptyline (ELAVIL) 25 mg, Oral, NIGHTLY    Cholecalciferol (VITAMIN D3) 125 MCG (5000 UT) TABS Oral, DAILY, Unsure of mcg, takes daily in am    melatonin 5 mg, Oral, NIGHTLY    metoprolol tartrate (LOPRESSOR) 25 MG tablet Take two tabs once prn    pantoprazole (PROTONIX) 40 mg, Oral, DAILY BEFORE BREAKFAST       I have personally reviewed labs and tests:  Recent Results (from the past 24 hour(s))   Lactate, Sepsis    Collection Time: 02/09/25 10:11 AM   Result Value Ref Range    Lactic Acid, Sepsis 1.2 0.5 - 2.0 MMOL/L   CBC with Auto Differential    Collection Time: 02/09/25 10:11 AM   Result Value Ref Range    WBC 8.0 4.3 - 11.1 K/uL    RBC 4.48 4.05 - 5.2 M/uL    Hemoglobin 12.1 11.7 - 15.4 g/dL    Hematocrit 36.8 35.8 - 46.3 %    MCV 82.1 82 - 102 FL    MCH 27.0 26.1 - 32.9 PG    MCHC 32.9 31.4 - 35.0 g/dL    RDW 15.2 (H) 11.9 - 14.6 %    Platelets 208 150 - 450 K/uL     Calcified left paratracheal lymphadenopathy is worsened in comparison to the chest CT of 6/17/2021. This may be consequent to chronic granulomatous disease. Routine follow-up chest CT is recommended for complete evaluation. 5. Full features are otherwise as detailed above. Electronically signed by TEVIN MARIA    CT HEAD WO CONTRAST    Result Date: 2/9/2025  EXAMINATION: CT HEAD WO CONTRAST 2/9/2025 12:04 PM ACCESSION NUMBER: [id number] COMPARISON: Same day CTA head and neck INDICATION: Transient ischemic attack TECHNIQUE: Multiple-row detector helical CT examination of the head without intravenous contrast. Radiation dose reduction techniques were used for this study. Our CT scanners use one or all of the following: Automated exposure control, adjustment of the mA and/or kV according to patient size, iterative reconstruction. FINDINGS: The ventricles are within normal limits for the mild degree of global brain parenchymal volume loss. There is no midline shift. The basilar cisterns are patent. There is no cerebellar tonsillar ectopia or herniation. Hypodensities in the periventricular and subcortical white matter are nonspecific, but they are most likely to represent chronic microangiopathy. Prior bilateral lens replacement. There is no evidence of acute intracranial hemorrhage or extra-axial collections. There is no CT evidence of acute infarction. The imaged portions of the paranasal sinuses and mastoid air cells are well aerated and clear.     No evidence of acute intracranial abnormality. Electronically signed by TEVIN MARIA    XR CHEST (2 VW)    Result Date: 2/9/2025  Chest X-ray INDICATION: suspected infection COMPARISON:  None TECHNIQUE: PA and lateral views of the chest were obtained. FINDINGS: Pacemaker. Diffuse interstitial changes. Blunting of the posterior costophrenic angle likely due to interstitial changes..  The heart size is normal.  The bony thorax is intact.      No acute findings in the

## 2025-02-09 NOTE — ED PROVIDER NOTES
Emergency Department Provider Note       PCP: Robert Caban MD   Age: 84 y.o.   Sex: female     DISPOSITION Decision To Admit 02/09/2025 01:18:44 PM    ICD-10-CM    1. TIA (transient ischemic attack)  G45.9       2. Influenza A  J10.1       3. COVID-19  U07.1       4. Gait instability  R26.81           Medical Decision Making     Vital signs reviewed, patient stable, NAD, afebrile, nontoxic in appearance     Patient is currently symptom-free from her symptoms this morning regarding speech.  Query TIA.    Discussed this patient with ER attending, Dr. White as I do believe patient will need admission.    Septic workup obtained out of triage.  Have added on an RSV.  Will obtain CT head without contrast and CTA.  Will administer Tylenol as patient is febrile.    EKG demonstrates a sinus rhythm with some PACs.  No significant ST elevations or depressions.  No STEMI    Patient positive for influenza A and COVID-19    Chest x-ray demonstrates some blunting of the posterior costophrenic angle likely due to interstitial changes.  No cardiomegaly.  Diffuse interstitial changes are present.    Blood work overall reassuring without any emergent findings.    CT of the head demonstrates ventricles within normal limits for mild degree of global brain parenchymal volume loss.  There is no midline shift.  The basilar cisterns are patent.  There is no cerebellar tonsillar ectopy or herniation.  Hypodensities in the periventricular and subcortical white matter are nonspecific but they likely represent chronic microangiopathy.  No evidence of acute intracranial hemorrhage or extra-axial fluids.  No CT evidence of acute infarct.    CTA of the head and neck without large vessel occlusion.  Incidental findings of calcified upper lung nodule seen on prior exams.  Patient has chronic compression fractures T4 T6 unchanged from prior exam.  Scattered atherosclerotic changes are worse involving the distal right P3 segment.  Smooth

## 2025-02-09 NOTE — ED NOTES
TRANSFER - OUT REPORT:    Verbal report given to Migdalia CROCKER on Jo Renee  being transferred to room 733 for routine progression of patient care       Report consisted of patient's Situation, Background, Assessment and   Recommendations(SBAR).     Information from the following report(s) ED SBAR was reviewed with the receiving nurse.    Lines:   Peripheral IV 02/09/25 Right Hand (Active)   Site Assessment Clean, dry & intact 02/09/25 1614   Line Status Blood return noted;Normal saline locked;Flushed 02/09/25 1614   Line Care Connections checked and tightened 02/09/25 1614   Phlebitis Assessment No symptoms 02/09/25 1614   Infiltration Assessment 0 02/09/25 1614   Dressing Status Clean, dry & intact 02/09/25 1614        Opportunity for questions and clarification was provided.      Patient transported with:  Registered Nurse      Joanna Olvera RN  02/09/25 [id number]

## 2025-02-10 PROBLEM — I48.91 ATRIAL FIBRILLATION WITH RAPID VENTRICULAR RESPONSE (HCC): Status: ACTIVE | Noted: 2020-07-14

## 2025-02-10 PROBLEM — J10.1 INFLUENZA A: Status: ACTIVE | Noted: 2025-02-10

## 2025-02-10 LAB
ANION GAP SERPL CALC-SCNC: 8 MMOL/L (ref 7–16)
APPEARANCE UR: CLEAR
BILIRUB UR QL: NEGATIVE
BUN SERPL-MCNC: 11 MG/DL (ref 8–23)
CALCIUM SERPL-MCNC: 7.5 MG/DL (ref 8.8–10.2)
CHLORIDE SERPL-SCNC: 107 MMOL/L (ref 98–107)
CHOLEST SERPL-MCNC: 99 MG/DL (ref 0–200)
CO2 SERPL-SCNC: 21 MMOL/L (ref 20–29)
COLOR UR: ABNORMAL
CREAT SERPL-MCNC: 0.61 MG/DL (ref 0.6–1.1)
EKG ATRIAL RATE: 84 BPM
EKG ATRIAL RATE: 86 BPM
EKG ATRIAL RATE: 89 BPM
EKG DIAGNOSIS: NORMAL
EKG P AXIS: 75 DEGREES
EKG P AXIS: 76 DEGREES
EKG P AXIS: 78 DEGREES
EKG P-R INTERVAL: 152 MS
EKG P-R INTERVAL: 152 MS
EKG P-R INTERVAL: 158 MS
EKG Q-T INTERVAL: 296 MS
EKG Q-T INTERVAL: 328 MS
EKG Q-T INTERVAL: 346 MS
EKG Q-T INTERVAL: 350 MS
EKG QRS DURATION: 80 MS
EKG QRS DURATION: 82 MS
EKG QTC CALCULATION (BAZETT): 399 MS
EKG QTC CALCULATION (BAZETT): 413 MS
EKG QTC CALCULATION (BAZETT): 414 MS
EKG QTC CALCULATION (BAZETT): 430 MS
EKG R AXIS: 39 DEGREES
EKG R AXIS: 41 DEGREES
EKG R AXIS: 64 DEGREES
EKG R AXIS: 73 DEGREES
EKG T AXIS: 27 DEGREES
EKG T AXIS: 34 DEGREES
EKG T AXIS: 41 DEGREES
EKG T AXIS: 44 DEGREES
EKG VENTRICULAR RATE: 127 BPM
EKG VENTRICULAR RATE: 84 BPM
EKG VENTRICULAR RATE: 86 BPM
EKG VENTRICULAR RATE: 89 BPM
ERYTHROCYTE [DISTWIDTH] IN BLOOD BY AUTOMATED COUNT: 15.5 % (ref 11.9–14.6)
EST. AVERAGE GLUCOSE BLD GHB EST-MCNC: 125 MG/DL
GLUCOSE SERPL-MCNC: 111 MG/DL (ref 70–99)
GLUCOSE UR STRIP.AUTO-MCNC: NEGATIVE MG/DL
HBA1C MFR BLD: 6 % (ref 0–5.6)
HCT VFR BLD AUTO: 30.9 % (ref 35.8–46.3)
HDLC SERPL-MCNC: 37 MG/DL (ref 40–60)
HDLC SERPL: 2.7 (ref 0–5)
HGB BLD-MCNC: 10.1 G/DL (ref 11.7–15.4)
HGB UR QL STRIP: NEGATIVE
KETONES UR QL STRIP.AUTO: NEGATIVE MG/DL
LDLC SERPL CALC-MCNC: 53 MG/DL (ref 0–100)
LEUKOCYTE ESTERASE UR QL STRIP.AUTO: NEGATIVE
MAGNESIUM SERPL-MCNC: 2.1 MG/DL (ref 1.8–2.4)
MCH RBC QN AUTO: 27.4 PG (ref 26.1–32.9)
MCHC RBC AUTO-ENTMCNC: 32.7 G/DL (ref 31.4–35)
MCV RBC AUTO: 83.7 FL (ref 82–102)
NITRITE UR QL STRIP.AUTO: NEGATIVE
NRBC # BLD: 0 K/UL (ref 0–0.2)
PH UR STRIP: 6 (ref 5–9)
PLATELET # BLD AUTO: 166 K/UL (ref 150–450)
PMV BLD AUTO: 10.6 FL (ref 9.4–12.3)
POTASSIUM SERPL-SCNC: 4 MMOL/L (ref 3.5–5.1)
PROT UR STRIP-MCNC: NEGATIVE MG/DL
RBC # BLD AUTO: 3.69 M/UL (ref 4.05–5.2)
SODIUM SERPL-SCNC: 136 MMOL/L (ref 136–145)
SP GR UR REFRACTOMETRY: 1.03 (ref 1–1.02)
TRIGL SERPL-MCNC: 45 MG/DL (ref 0–150)
UFH PPP CHRO-ACNC: 0.42 IU/ML (ref 0.3–0.7)
UROBILINOGEN UR QL STRIP.AUTO: 0.2 EU/DL (ref 0.2–1)
VLDLC SERPL CALC-MCNC: 9 MG/DL (ref 6–23)
WBC # BLD AUTO: 5.3 K/UL (ref 4.3–11.1)

## 2025-02-10 PROCEDURE — 93010 ELECTROCARDIOGRAM REPORT: CPT | Performed by: INTERNAL MEDICINE

## 2025-02-10 PROCEDURE — 6370000000 HC RX 637 (ALT 250 FOR IP): Performed by: INTERNAL MEDICINE

## 2025-02-10 PROCEDURE — 85027 COMPLETE CBC AUTOMATED: CPT

## 2025-02-10 PROCEDURE — G0378 HOSPITAL OBSERVATION PER HR: HCPCS

## 2025-02-10 PROCEDURE — 92523 SPEECH SOUND LANG COMPREHEN: CPT

## 2025-02-10 PROCEDURE — 97162 PT EVAL MOD COMPLEX 30 MIN: CPT

## 2025-02-10 PROCEDURE — 6370000000 HC RX 637 (ALT 250 FOR IP): Performed by: NURSE PRACTITIONER

## 2025-02-10 PROCEDURE — 97535 SELF CARE MNGMENT TRAINING: CPT

## 2025-02-10 PROCEDURE — 93005 ELECTROCARDIOGRAM TRACING: CPT | Performed by: INTERNAL MEDICINE

## 2025-02-10 PROCEDURE — 85520 HEPARIN ASSAY: CPT

## 2025-02-10 PROCEDURE — 81003 URINALYSIS AUTO W/O SCOPE: CPT

## 2025-02-10 PROCEDURE — 2580000003 HC RX 258: Performed by: CASE MANAGER/CARE COORDINATOR

## 2025-02-10 PROCEDURE — 99222 1ST HOSP IP/OBS MODERATE 55: CPT | Performed by: INTERNAL MEDICINE

## 2025-02-10 PROCEDURE — 96366 THER/PROPH/DIAG IV INF ADDON: CPT

## 2025-02-10 PROCEDURE — 97530 THERAPEUTIC ACTIVITIES: CPT

## 2025-02-10 PROCEDURE — 83735 ASSAY OF MAGNESIUM: CPT

## 2025-02-10 PROCEDURE — 96361 HYDRATE IV INFUSION ADD-ON: CPT

## 2025-02-10 PROCEDURE — 80061 LIPID PANEL: CPT

## 2025-02-10 PROCEDURE — 83036 HEMOGLOBIN GLYCOSYLATED A1C: CPT

## 2025-02-10 PROCEDURE — 92610 EVALUATE SWALLOWING FUNCTION: CPT

## 2025-02-10 PROCEDURE — 2500000003 HC RX 250 WO HCPCS

## 2025-02-10 PROCEDURE — 97165 OT EVAL LOW COMPLEX 30 MIN: CPT

## 2025-02-10 PROCEDURE — 6370000000 HC RX 637 (ALT 250 FOR IP)

## 2025-02-10 PROCEDURE — 80048 BASIC METABOLIC PNL TOTAL CA: CPT

## 2025-02-10 PROCEDURE — 93005 ELECTROCARDIOGRAM TRACING: CPT

## 2025-02-10 PROCEDURE — 36415 COLL VENOUS BLD VENIPUNCTURE: CPT

## 2025-02-10 RX ORDER — SOTALOL HYDROCHLORIDE 80 MG/1
40 TABLET ORAL EVERY 12 HOURS
Status: DISCONTINUED | OUTPATIENT
Start: 2025-02-10 | End: 2025-02-13 | Stop reason: HOSPADM

## 2025-02-10 RX ORDER — FUROSEMIDE 10 MG/ML
20 INJECTION INTRAMUSCULAR; INTRAVENOUS ONCE
Status: COMPLETED | OUTPATIENT
Start: 2025-02-11 | End: 2025-02-11

## 2025-02-10 RX ADMIN — SODIUM CHLORIDE, PRESERVATIVE FREE 10 ML: 5 INJECTION INTRAVENOUS at 21:11

## 2025-02-10 RX ADMIN — APIXABAN 2.5 MG: 5 TABLET, FILM COATED ORAL at 21:11

## 2025-02-10 RX ADMIN — ASPIRIN 81 MG: 81 TABLET, CHEWABLE ORAL at 09:22

## 2025-02-10 RX ADMIN — SOTALOL HYDROCHLORIDE 40 MG: 80 TABLET ORAL at 21:08

## 2025-02-10 RX ADMIN — SOTALOL HYDROCHLORIDE 40 MG: 80 TABLET ORAL at 09:22

## 2025-02-10 RX ADMIN — GUAIFENESIN SYRUP AND DEXTROMETHORPHAN 5 ML: 100; 10 SYRUP ORAL at 16:52

## 2025-02-10 RX ADMIN — SODIUM CHLORIDE: 9 INJECTION, SOLUTION INTRAVENOUS at 06:44

## 2025-02-10 RX ADMIN — Medication 3 MG: at 02:39

## 2025-02-10 RX ADMIN — ROSUVASTATIN CALCIUM 40 MG: 20 TABLET, FILM COATED ORAL at 21:11

## 2025-02-10 RX ADMIN — AMITRIPTYLINE HYDROCHLORIDE 25 MG: 50 TABLET ORAL at 23:17

## 2025-02-10 RX ADMIN — Medication 4.5 MG: at 23:17

## 2025-02-10 RX ADMIN — SODIUM CHLORIDE, PRESERVATIVE FREE 10 ML: 5 INJECTION INTRAVENOUS at 09:30

## 2025-02-10 RX ADMIN — PANTOPRAZOLE SODIUM 40 MG: 40 TABLET, DELAYED RELEASE ORAL at 06:43

## 2025-02-10 RX ADMIN — SODIUM CHLORIDE 500 ML: 9 INJECTION, SOLUTION INTRAVENOUS at 00:37

## 2025-02-10 RX ADMIN — APIXABAN 2.5 MG: 5 TABLET, FILM COATED ORAL at 09:31

## 2025-02-10 RX ADMIN — SODIUM CHLORIDE: 9 INJECTION, SOLUTION INTRAVENOUS at 01:13

## 2025-02-10 NOTE — ICUWATCH
RRT Clinical Rounding Nurse Progress Report      SUBJECTIVE: Called to assess patient secondary to RN/provider concern - elevated heart rate .      Vitals:    02/09/25 1954 02/09/25 2002 02/09/25 2006 02/09/25 2011   BP:   (!) 83/55 (!) 91/47   Pulse: (!) 105 87 84 92   Resp:       Temp:       TempSrc:       SpO2:       Weight:       Height:              ASSESSMENT:  On arrival to room, I found patient to be resting in bed quietly. Patient is oriented X 4. Denies major pain or SOB. Respirations even and unlabored. HR elevated in 140s with MAP in mid 70s prior to 5mg diltiazem push. HR improved to 80s-110 following diltiazem push. BP soft with MAP of 60, and so 500ml fluid bolus ordered and initiated per cardiology NP at bedside. No needs or concerns expressed at this time.    PLAN:  Recent labs/notes/vitals reviewed, and patient discussed with primary RN. Will follow per RRT Clinical Rounding Program protocol. Primary RN to call with concerns. Patient to transfer to telemetry unit for monitoring and initiation of diltiazem infusion.    Jerome Wilson RN  [place]: [phone]

## 2025-02-10 NOTE — PROGRESS NOTES
ACUTE OCCUPATIONAL THERAPY GOALS:   (Developed with and agreed upon by patient and/or caregiver.)  1. Pt will toilet independently  2. Pt will complete functional mobility for ADLs with SBA  3.  Pt will complete grooming and hygiene at sink independently  4.. Pt will independently demonstrate/ verbalize 2+ energy conservation techniques to promote increased endurance for ADLs    Goals met      OCCUPATIONAL THERAPY Initial Assessment, Daily Note, and Discharge       OT Visit Days: 1  Acknowledge Orders  Time  OT Charge Capture  Rehab Caseload Tracker      Jo Renee is a 84 y.o. female   PRIMARY DIAGNOSIS: TIA (transient ischemic attack)  TIA (transient ischemic attack) [G45.9]  Influenza A [J10.1]  Gait instability [R26.81]  COVID-19 [U07.1]       Reason for Referral: Generalized Muscle Weakness (M62.81)  Observation: Payor: [employer] MEDICARE / Plan: [employer] GOLD PLUS HMO / Product Type: *No Product type* /     ASSESSMENT:     REHAB RECOMMENDATIONS:   Recommendation to date pending progress:  Setting:  No further skilled occupational therapy after discharge from hospital    Equipment:    None     ASSESSMENT:  Ms. Renee was admitted with TIA symptoms, COVID and flu A positive. Pt presented with generalized weakness and decreased activity tolerance, however remains independent with ADLs. Pt slightly unsteady at times d/t weakness, educated on recommendation of using rolling walker and on energy conservation techniques and verbalized understanding. Pt does not require further skilled OT services at this time, no d/c needs.      [facility] AM-PAC™ “6 Clicks” Daily Activity Inpatient Short Form:    AM-PAC Daily Activity - Inpatient   How much help is needed for putting on and taking off regular lower body clothing?: None  How much help is needed for bathing (which includes washing, rinsing, drying)?: None  How much help is needed for toileting (which includes using toilet, bedpan, or urinal)?:  [] []    Supine to Sit [x] [] [] [] [] [] [] [] [] [] []    Scooting [x] [] [] [] [] [] [] [] [] [] []    Sit to Supine [x] [] [] [] [] [] [] [] [] [] []    Transfers    Sit to Stand [] [] [] [x] [] [] [] [] [] [] []    Bed to Chair [] [] [] [x] [] [] [] [] [] [] []    Stand to Sit [] [] [] [x] [] [] [] [] [] [] []    Tub/Shower [] [] [] [] [] [] [] [] [] [] []     Toilet [] [] [] [x] [] [] [] [] [] [] []      [] [] [] [] [] [] [] [] [] [] []    I=Independent, Mod I=Modified Independent, S=Supervision/Setup, SBA=Standby Assistance, CGA=Contact Guard Assistance, Min=Minimal Assistance, Mod=Moderate Assistance, Max=Maximal Assistance, Total=Total Assistance, NT=Not Tested    ACTIVITIES OF DAILY LIVING: I Mod I S SBA CGA Min Mod Max Total NT Comments   BASIC ADLs:              Upper Body Bathing  [] [] [] [] [] [] [] [] [] []     Lower Body Bathing [] [] [] [] [] [] [] [] [] []     Toileting [x] [] [] [] [] [] [] [] [] []    Upper Body Dressing [] [] [] [] [] [] [] [] [] []    Lower Body Dressing [] [] [] [] [] [] [] [] [] []    Feeding [] [] [] [] [] [] [] [] [] []    Grooming [x] [] [] [] [] [] [] [] [] []    Personal Device Care [] [] [] [] [] [] [] [] [] []    Functional Mobility [] [] [] [x] [] [] [] [] [] []    I=Independent, Mod I=Modified Independent, S=Supervision/Setup, SBA=Standby Assistance, CGA=Contact Guard Assistance, Min=Minimal Assistance, Mod=Moderate Assistance, Max=Maximal Assistance, Total=Total Assistance, NT=Not Tested      TREATMENT:     EVALUATION: LOW COMPLEXITY: (Untimed Charge)  The initial evaluation charge encompasses clinical chart review, objective assessment, interpretation of assessment, and skilled monitoring of the patient's response to treatment in order to develop a plan of care.     TREATMENT:   Self Care (8 minutes): Patient participated in toileting, grooming, functional mobility, bed mobility, bathroom mobility, and energy conservation in unsupported sitting and standing with

## 2025-02-10 NOTE — PROGRESS NOTES
Patient sustaining in Afib RVR, HR 130s-140s. BP 91/63. Zulay, NP notified. Orders for NS 500ml bolus, then NS infusion at 75ml/hr.

## 2025-02-10 NOTE — ACP (ADVANCE CARE PLANNING)
Advance Care Planning     Advance Care Planning Activator (Inpatient)  Conversation Note      Date of ACP Conversation: 2/10/2025     Conversation Conducted with: Patient with Decision Making Capacity    ACP Activator: HALEY MONROE RN      Health Care Decision Maker:     Current Designated Health Care Decision Maker:     Primary Decision Maker: ThelmaDaniel - [facility] - [phone]  Click here to complete Healthcare Decision Makers including section of the Healthcare Decision Maker Relationship (ie \"Primary\")  Today we documented Decision Maker(s) consistent with Legal Next of Kin hierarchy.    Care Preferences: Full code status order placed by Dr Shook

## 2025-02-10 NOTE — PROGRESS NOTES
TRANSFER - IN REPORT:    Verbal report received from LIZZETTE Patel on Jo Renee being received from 7th floor for routine progression of care.     Report consisted of patient’s Situation, Background, Assessment and Recommendations(SBAR).     Information from the following report(s) SBAR, Kardex, MAR, Recent Results, and Cardiac Rhythm Afib RVR  was reviewed. Opportunity for questions and clarification was provided.

## 2025-02-10 NOTE — PLAN OF CARE
Problem: Discharge Planning  Goal: Discharge to home or other facility with appropriate resources  Outcome: Progressing  Flowsheets (Taken 2/9/2025 2000 by Amanda Jessica RN)  Discharge to home or other facility with appropriate resources:   Identify barriers to discharge with patient and caregiver   Arrange for needed discharge resources and transportation as appropriate   Identify discharge learning needs (meds, wound care, etc)   Refer to discharge planning if patient needs post-hospital services based on physician order or complex needs related to functional status, cognitive ability or social support system     Problem: Safety - Adult  Goal: Free from fall injury  Outcome: Progressing     Problem: ABCDS Injury Assessment  Goal: Absence of physical injury  Outcome: Progressing     Problem: Pain  Goal: Verbalizes/displays adequate comfort level or baseline comfort level  Outcome: Progressing

## 2025-02-10 NOTE — PROGRESS NOTES
TRANSFER - IN REPORT:    Assessment completed upon patient’s arrival to unit and care assumed.     Patient received to room 435. Patient connected to monitor and assessment completed. Plan of care reviewed. Patient oriented to room and call light. Patient aware to use call light to communicate any chest pain or needs.

## 2025-02-10 NOTE — MANAGEMENT PLAN
Notified by primary RN of heart rates 130s to 140s, A-fib RVR with blood pressure 91/63 (72).  Pt voiced to me that she has had limited PO intake over the last several days.      Plan:  Will give a fluid bolus 500ml now, reassess BP following bolus.    If pressures allow, will start cardizem low dose gtt.  Add NS continuous fluids overnight.  Recheck AM labs/lytes and replete as necessary.    Signed,   Zulay Robledo, CNP

## 2025-02-10 NOTE — MANAGEMENT PLAN
1L NS infusing on my arrival to see pt, 's-140's in Atrial Fibrillation RVR.  Pt is largely asymptomatic and denies any lightheadedness, palpitations, chest pain/pressure/tightness, n/v/d chills, orthopnea.  Pressures remain soft with a map above 70 with half of the liter completed.  Primary team considered cardioversion.  Given pt is not clinically unstable, and arrived with stroke-like symptoms, has hx of PAF, and is not anticoagulated at home, would not recommend cardioversion at this time. Pt is Flu A and Covid+.    Plan:  Give Cardizem 5mg now, reassess effect.    Start Heparin bolus w/ gtt.   Plan for transfer to cardiology floor.    Signed,     Zulay Cubas, CNP

## 2025-02-10 NOTE — PROGRESS NOTES
ACUTE PHYSICAL THERAPY GOALS:   (Developed with and agreed upon by patient and/or caregiver.)  Pt will perform supine to/from sit with mod I in 7 treatment days.  Pt will perform sit to/from stand with mod I and LRAD in 7 treatment days.  Pt will ambulate 150 ft with mod I and LRAD in 7 treatment days.  Pt will be independent with HEP in 7 days.      PHYSICAL THERAPY Initial Assessment, Daily Note, and AM  (Link to Caseload Tracking: PT Visit Days : 1  Acknowledge Orders  Time In/Out  PT Charge Capture  Rehab Caseload Tracker    Jo Renee is a 84 y.o. female   PRIMARY DIAGNOSIS: TIA (transient ischemic attack)  TIA (transient ischemic attack) [G45.9]  Influenza A [J10.1]  Gait instability [R26.81]  COVID-19 [U07.1]       Reason for Referral: Generalized Muscle Weakness (M62.81)  Other lack of cordination (R27.8)  Difficulty in walking, Not elsewhere classified (R26.2)  History of falling (Z91.81)  Observation: Payor: [employer] MEDICARE / Plan: [employer] GOLD PLUS HMO / Product Type: *No Product type* /     ASSESSMENT:     REHAB RECOMMENDATIONS:   Recommendation to date pending progress:  Setting:  Home Health Therapy    Equipment:    Rolling Walker     ASSESSMENT:  Ms. Renee is a 84 y.o. female admitted with dizziness/weakness, concerning for TIA.  Pt also COVID and Flu-A positive.  Upon PT evaluation, pt exhibits gross weakness, impaired balance, and reduced activity tolerance resulting in limited independence with functional mobility.  At baseline, pt is independent for all mobility.  Pt is now requiring SBA for bed mobility, transfers, and ambulation with no AD.  Pt was noted to lose balance several times while ambulating in room, but was able to self-recover.  She is a moderate fall risk per Tinetti.  Orthostatics negative.     Pt will require HHPT and RW at discharge.  Pt will continue to benefit from skilled PT to address above impairments and maximize functional independence prior to discharge.

## 2025-02-10 NOTE — CARE COORDINATION
02/10/25 1345   Service Assessment   Patient Orientation Alert and Oriented   Cognition Alert   History Provided By Patient   Primary Caregiver Self   Accompanied By/Relationship No one at bedside   Support Systems Spouse/Significant Other;Children;Friends/Neighbors   Patient's Healthcare Decision Maker is: Named in Scanned ACP Document   PCP Verified by CM Yes   Last Visit to PCP Within last 3 months   Prior Functional Level Independent in ADLs/IADLs   Current Functional Level Assistance with the following:;Cooking;Housework;Shopping   Can patient return to prior living arrangement Yes   Ability to make needs known: Good   Family able to assist with home care needs: Yes   Would you like for me to discuss the discharge plan with any other family members/significant others, and if so, who? Yes  (Spouse)   Financial Resources Medicare   Community Resources None   CM/SW Referral Other (see comment)  (N/A)   Social/Functional History   Lives With Spouse   Type of Home House   Home Layout One level   Home Access Ramped entrance   Home Equipment None   Receives Help From Family   Prior Level of Assist for ADLs Independent   Prior Level of Assist for Homemaking Independent   Ambulation Assistance Independent   Prior Level of Assist for Transfers Independent   Active  Yes   Mode of Transportation Car   Occupation Retired   Discharge Planning   Type of Residence House   Living Arrangements Spouse/Significant Other   Current Services Prior To Admission None   Potential Assistance Needed N/A   Potential Assistance Purchasing Medications No   Patient expects to be discharged to: House   Services At/After Discharge   Transition of Care Consult (CM Consult) Home Health   Services At/After Discharge Home Health    Resource Information Provided? No   Mode of Transport at Discharge Other (see comment)  (Car)   Confirm Follow Up Transport Family   Condition of Participation: Discharge Planning   The Plan for  Transition of Care is related to the following treatment goals: Home with home health   The Patient and/or Patient Representative was provided with a Choice of Provider? Patient   The Patient and/Or Patient Representative agree with the Discharge Plan? Yes   Freedom of Choice list was provided with basic dialogue that supports the patient's individualized plan of care/goals, treatment preferences, and shares the quality data associated with the providers?  Yes     Patient hospitalized with TIA. CT head completed. PT, OT, SLP following. Patient placed on OAC. MRI brain today.  Diagnosed with Covid 19 and Influenza. Droplet Plus isolation.   CM met with patient for assessment. Patient is alert and oriented X 4. Verified PCP, demographic, and insurance, Lives with her spouse in a house and functions independently PTA. No DME needs  or current supportive care.   PT recommending home health and gabriel walker. Patient reports she has a RW and doesn't want another.SLP recommending no skilled therapy after discharge.   Home health order placed. CM left list of home health agencies with quality metrics.

## 2025-02-10 NOTE — PROGRESS NOTES
TRANSFER - OUT REPORT:    Verbal report given to LIZZETTE Maguire on Jo Renee  being transferred to Telemetry for routine progression of patient care       Report consisted of patient's Situation, Background, Assessment and   Recommendations(SBAR).     Information from the following report(s) Nurse Handoff Report, Index, ED Encounter Summary, ED SBAR, Adult Overview, Intake/Output, MAR, Recent Results, Med Rec Status, Cardiac Rhythm Afib RVR , Alarm Parameters, Pre Procedure Checklist, Procedure Verification, Quality Measures, Neuro Assessment, and Event Log was reviewed with the receiving nurse.           Lines:   Peripheral IV 02/09/25 Right Hand (Active)   Site Assessment Clean, dry & intact 02/09/25 1614   Line Status Blood return noted;Normal saline locked;Flushed 02/09/25 1614   Line Care Connections checked and tightened 02/09/25 1614   Phlebitis Assessment No symptoms 02/09/25 1614   Infiltration Assessment 0 02/09/25 1614   Dressing Status Clean, dry & intact 02/09/25 1614       Peripheral IV 02/09/25 Right Forearm (Active)        Opportunity for questions and clarification was provided.      Patient transported with:  Registered Nurse

## 2025-02-10 NOTE — PROGRESS NOTES
4 Eyes Skin Assessment     NAME:  Jo Renee  YOB: 1940  MEDICAL RECORD NUMBER:  [id number]    The patient is being assessed for  Admission    I agree that at least one RN has performed a thorough Head to Toe Skin Assessment on the patient. ALL assessment sites listed below have been assessed.      Areas assessed by both nurses:    Head, Face, Ears, Shoulders, Back, Chest, Arms, Elbows, Hands, Sacrum. Buttock, Coccyx, Ischium, and Legs. Feet and Heels        Does the Patient have a Wound? No noted wound(s)    Scattered scars and bruising       Oleksandr Prevention initiated by RN: Yes  Wound Care Orders initiated by RN: No    Pressure Injury (Stage 3,4, Unstageable, DTI, NWPT, and Complex wounds) if present, place Wound referral order by RN under : No    New Ostomies, if present place, Ostomy referral order under : No     Nurse 1 eSignature: Electronically signed by Andrez Massey RN on 2/9/25 at 11:25 PM EST    **SHARE this note so that the co-signing nurse can place an eSignature**    Nurse 2 eSignature: Electronically signed by Jerome Donaldson RN on 2/9/25 at 11:29 PM EST

## 2025-02-10 NOTE — PROGRESS NOTES
SPEECH LANGUAGE PATHOLOGY: COMBINED Dysphagia and Cognitive Communicaiton Initial Assessment and Discharge    Acknowledge Order  I  Therapy Time  I   Charges     I  Rehab Caseload Tracker    NAME: Jo Renee  : 1940  MRN: [id number]    ADMISSION DATE: 2025  PRIMARY DIAGNOSIS: TIA (transient ischemic attack)    ICD-10: Treatment Diagnosis: R13.11 Dysphagia, Oral Phase  R47.8 Slurred Speech  F80.1 Expressive Language Disorder    RECOMMENDATIONS   Diet:    Regular Consistency  Thin Liquids    Medication: as tolerated   Compensatory Swallowing Strategies:   Upright as possible for all oral intake   Therapeutic Intervention:   Patient/family education  Dysphagia treatment  Language treatment  Speech treatment  No additional speech therapy intervention indicated at this time.    Patient continues to require skilled intervention:  No. Please re-consult if new concerns arise.      Anticipated Discharge Needs: No additional speech therapy is indicated.      ASSESSMENT    Dysphagia: Patient presents with functional swallow as able to be determined at bedside characterized by efficient oral prep and transfer, timely swallow initiation, and no overt indications of airway compromise when consuming regular items from breakfast tray and thin liquids. Patient reports that she prefers larger tablets crushed in applesauce, but can tolerate smaller tablets whole with water. Communication board updated.     Cognitive Communication: Patient is alert and oriented x4, exhibits functional recall of hospitalization and course of stay. Patient exhibits appropriate repetition, recall, attention, and sequencing for task completion. No further speech therapy indicated.     Recommend regular solids, thin liquids, medications per patient preference. No further speech therapy indicated.     GENERAL    Subjective: Resting in bed, no family at bedside. Agreeable to skilled speech therapy assessment.     Reason for Consult:

## 2025-02-10 NOTE — PROGRESS NOTES
Hospitalist Progress Note   Admit Date:  2025 10:12 AM   Name:  Jo Renee   Age:  84 y.o.  Sex:  female  :  1940   MRN:  [id number]   Room:  [facility]    Presenting/Chief Complaint: Fatigue     Reason(s) for Admission: TIA (transient ischemic attack) [G45.9]  Influenza A [J10.1]  Gait instability [R26.81]  COVID-19 [U07.1]     Hospital Course:   Jo Renee is a 84 y.o. female with medical history of sick sinus syndrome status post pacemaker, hypothyroidism, hypertension, who presented to the ER with complaints of dizziness and weakness of 1 days duration.  Patient reports that Saturday morning when she woke up she began to feel weak.  Additionally reports cough that developed over this time.  Denies any sick contacts.  Reports that yesterday she began to have difficulty speaking in full sentences and noticed she was not making sense.  Also reports vertigo-like sensation at that time.  Denies any vision changes.  Denies any weakness on one side more than another.  In the ER patient was febrile at 100.5.  Heart rate 99.  EKG shows sinus rhythm with PAC.  Patient tested positive for COVID-19 and influenza.  Chest x-ray with no acute findings.  CT head with no evidence of acute intracranial abnormality.  There was no LVO on CTA head and neck.  There were scattered atherosclerotic changes.  Smooth irregularity of the distal cervical segment of the left internal carotid artery, question related to fibromuscular dysplasia.  7 mm calcified nodule in right upper lobe which is similar to prior exam.  Left paratracheal lymphadenopathy worse compared to CT 2021 and radiology recommended routine follow-up CT chest to evaluate for chronic granulomatous disease.  ER recommended admission for TIA workup.     Overnight  patient went into A-fib with RVR. Cardiology consulted who started Eliquis and sotalol. Heart rate improved with intervention.     Subjective & 24hr Events:   Overnight  MG/DL    LDL Cholesterol 53 0 - 100 MG/DL    VLDL Cholesterol Calculated 9 6 - 23 MG/DL    Chol/HDL Ratio 2.7 0.0 - 5.0     Anti-XA, Heparin    Collection Time: 02/10/25  3:42 AM   Result Value Ref Range    Anti-XA Unfrac Heparin 0.42 0.3 - 0.7 IU/mL   Magnesium    Collection Time: 02/10/25  3:42 AM   Result Value Ref Range    Magnesium 2.1 1.8 - 2.4 mg/dL   Urinalysis w rflx microscopic    Collection Time: 02/10/25  4:35 AM   Result Value Ref Range    Color, UA YELLOW/STRAW      Appearance CLEAR      Specific Gravity, UA 1.030 (H) 1.001 - 1.023      pH, Urine 6.0 5.0 - 9.0      Protein, UA Negative NEG mg/dL    Glucose, Ur Negative NEG mg/dL    Ketones, Urine Negative NEG mg/dL    Bilirubin, Urine Negative NEG      Blood, Urine Negative NEG      Urobilinogen, Urine 0.2 0.2 - 1.0 EU/dL    Nitrite, Urine Negative NEG      Leukocyte Esterase, Urine Negative NEG     EKG 12 Lead    Collection Time: 02/10/25  8:25 AM   Result Value Ref Range    Ventricular Rate 86 BPM    Atrial Rate 86 BPM    P-R Interval 152 ms    QRS Duration 80 ms    Q-T Interval 346 ms    QTc Calculation (Bazett) 414 ms    P Axis 75 degrees    R Axis 41 degrees    T Axis 27 degrees    Diagnosis       Normal sinus rhythm  Possible  Septal infarct , age undetermined  Abnormal ECG  When compared with ECG of 09-FEB-2025 17:55,  Sinus rhythm has replaced Atrial fibrillation  Possible  Septal infarct is now Present  Inverted T waves have replaced nonspecific T wave abnormality in Inferior   leads  Confirmed by MD CODY (), KEN ([id number]) on 2/10/2025 9:05:09 AM     EKG 12 Lead    Collection Time: 02/10/25 11:21 AM   Result Value Ref Range    Ventricular Rate 84 BPM    Atrial Rate 84 BPM    P-R Interval 152 ms    QRS Duration 82 ms    Q-T Interval 350 ms    QTc Calculation (Bazett) 413 ms    P Axis 78 degrees    R Axis 64 degrees    T Axis 34 degrees    Diagnosis       Sinus rhythm with occasional Premature ventricular complexes  Otherwise normal

## 2025-02-11 PROBLEM — I48.91 ATRIAL FIBRILLATION WITH RVR (HCC): Status: ACTIVE | Noted: 2025-02-11

## 2025-02-11 LAB
ANION GAP SERPL CALC-SCNC: 11 MMOL/L (ref 7–16)
BASOPHILS # BLD: 0.02 K/UL (ref 0–0.2)
BASOPHILS NFR BLD: 0.6 % (ref 0–2)
BUN SERPL-MCNC: 9 MG/DL (ref 8–23)
C DIFF GDH STL QL: ABNORMAL
C DIFF TOX A+B STL QL IA: ABNORMAL
C DIFF TOXIN INTERPRETATION: ABNORMAL
C. DIFFICILE TOXIN MOLECULAR: POSITIVE
CALCIUM SERPL-MCNC: 8 MG/DL (ref 8.8–10.2)
CHLORIDE SERPL-SCNC: 103 MMOL/L (ref 98–107)
CO2 SERPL-SCNC: 22 MMOL/L (ref 20–29)
CREAT SERPL-MCNC: 0.64 MG/DL (ref 0.6–1.1)
DIFFERENTIAL METHOD BLD: ABNORMAL
EKG ATRIAL RATE: 66 BPM
EKG ATRIAL RATE: 87 BPM
EKG ATRIAL RATE: 87 BPM
EKG DIAGNOSIS: NORMAL
EKG P AXIS: 82 DEGREES
EKG P AXIS: 83 DEGREES
EKG P AXIS: 85 DEGREES
EKG P-R INTERVAL: 152 MS
EKG P-R INTERVAL: 154 MS
EKG P-R INTERVAL: 154 MS
EKG Q-T INTERVAL: 356 MS
EKG Q-T INTERVAL: 358 MS
EKG Q-T INTERVAL: 432 MS
EKG QRS DURATION: 76 MS
EKG QRS DURATION: 76 MS
EKG QRS DURATION: 78 MS
EKG QTC CALCULATION (BAZETT): 428 MS
EKG QTC CALCULATION (BAZETT): 430 MS
EKG QTC CALCULATION (BAZETT): 452 MS
EKG R AXIS: 100 DEGREES
EKG R AXIS: 56 DEGREES
EKG R AXIS: 73 DEGREES
EKG T AXIS: 37 DEGREES
EKG T AXIS: 38 DEGREES
EKG T AXIS: 52 DEGREES
EKG VENTRICULAR RATE: 66 BPM
EKG VENTRICULAR RATE: 87 BPM
EKG VENTRICULAR RATE: 87 BPM
EOSINOPHIL # BLD: 0 K/UL (ref 0–0.8)
EOSINOPHIL NFR BLD: 0 % (ref 0.5–7.8)
ERYTHROCYTE [DISTWIDTH] IN BLOOD BY AUTOMATED COUNT: 15.7 % (ref 11.9–14.6)
GLUCOSE SERPL-MCNC: 89 MG/DL (ref 70–99)
HCT VFR BLD AUTO: 32 % (ref 35.8–46.3)
HGB BLD-MCNC: 10.3 G/DL (ref 11.7–15.4)
IMM GRANULOCYTES # BLD AUTO: 0.02 K/UL (ref 0–0.5)
IMM GRANULOCYTES NFR BLD AUTO: 0.6 % (ref 0–5)
LYMPHOCYTES # BLD: 0.87 K/UL (ref 0.5–4.6)
LYMPHOCYTES NFR BLD: 24.2 % (ref 13–44)
MAGNESIUM SERPL-MCNC: 1.6 MG/DL (ref 1.8–2.4)
MAGNESIUM SERPL-MCNC: 2.2 MG/DL (ref 1.8–2.4)
MCH RBC QN AUTO: 26.8 PG (ref 26.1–32.9)
MCHC RBC AUTO-ENTMCNC: 32.2 G/DL (ref 31.4–35)
MCV RBC AUTO: 83.3 FL (ref 82–102)
MONOCYTES # BLD: 0.24 K/UL (ref 0.1–1.3)
MONOCYTES NFR BLD: 6.7 % (ref 4–12)
NEUTS SEG # BLD: 2.45 K/UL (ref 1.7–8.2)
NEUTS SEG NFR BLD: 67.9 % (ref 43–78)
NRBC # BLD: 0 K/UL (ref 0–0.2)
PLATELET # BLD AUTO: 170 K/UL (ref 150–450)
PLATELET COMMENT: ADEQUATE
PMV BLD AUTO: 10.3 FL (ref 9.4–12.3)
POTASSIUM SERPL-SCNC: 3.6 MMOL/L (ref 3.5–5.1)
RBC # BLD AUTO: 3.84 M/UL (ref 4.05–5.2)
RBC MORPH BLD: ABNORMAL
REFLEX: ABNORMAL
SODIUM SERPL-SCNC: 136 MMOL/L (ref 136–145)
WBC # BLD AUTO: 3.6 K/UL (ref 4.3–11.1)
WBC MORPH BLD: ABNORMAL

## 2025-02-11 PROCEDURE — 85025 COMPLETE CBC W/AUTO DIFF WBC: CPT

## 2025-02-11 PROCEDURE — 6370000000 HC RX 637 (ALT 250 FOR IP): Performed by: INTERNAL MEDICINE

## 2025-02-11 PROCEDURE — 36415 COLL VENOUS BLD VENIPUNCTURE: CPT

## 2025-02-11 PROCEDURE — 87449 NOS EACH ORGANISM AG IA: CPT

## 2025-02-11 PROCEDURE — 97530 THERAPEUTIC ACTIVITIES: CPT

## 2025-02-11 PROCEDURE — 99222 1ST HOSP IP/OBS MODERATE 55: CPT | Performed by: PSYCHIATRY & NEUROLOGY

## 2025-02-11 PROCEDURE — 80048 BASIC METABOLIC PNL TOTAL CA: CPT

## 2025-02-11 PROCEDURE — 6370000000 HC RX 637 (ALT 250 FOR IP): Performed by: NURSE PRACTITIONER

## 2025-02-11 PROCEDURE — 96366 THER/PROPH/DIAG IV INF ADDON: CPT

## 2025-02-11 PROCEDURE — 87493 C DIFF AMPLIFIED PROBE: CPT

## 2025-02-11 PROCEDURE — 93005 ELECTROCARDIOGRAM TRACING: CPT | Performed by: INTERNAL MEDICINE

## 2025-02-11 PROCEDURE — 93010 ELECTROCARDIOGRAM REPORT: CPT | Performed by: INTERNAL MEDICINE

## 2025-02-11 PROCEDURE — 87324 CLOSTRIDIUM AG IA: CPT

## 2025-02-11 PROCEDURE — 99232 SBSQ HOSP IP/OBS MODERATE 35: CPT | Performed by: INTERNAL MEDICINE

## 2025-02-11 PROCEDURE — 96375 TX/PRO/DX INJ NEW DRUG ADDON: CPT

## 2025-02-11 PROCEDURE — 6360000002 HC RX W HCPCS: Performed by: NURSE PRACTITIONER

## 2025-02-11 PROCEDURE — 83735 ASSAY OF MAGNESIUM: CPT

## 2025-02-11 PROCEDURE — 6360000002 HC RX W HCPCS

## 2025-02-11 PROCEDURE — 6370000000 HC RX 637 (ALT 250 FOR IP)

## 2025-02-11 PROCEDURE — 1100000003 HC PRIVATE W/ TELEMETRY

## 2025-02-11 PROCEDURE — 2500000003 HC RX 250 WO HCPCS

## 2025-02-11 RX ORDER — VANCOMYCIN HYDROCHLORIDE 125 MG/1
125 CAPSULE ORAL 4 TIMES DAILY
Status: DISCONTINUED | OUTPATIENT
Start: 2025-02-11 | End: 2025-02-13 | Stop reason: HOSPADM

## 2025-02-11 RX ORDER — ACETAMINOPHEN 325 MG/1
650 TABLET ORAL EVERY 6 HOURS PRN
Status: DISCONTINUED | OUTPATIENT
Start: 2025-02-11 | End: 2025-02-13 | Stop reason: HOSPADM

## 2025-02-11 RX ADMIN — APIXABAN 2.5 MG: 5 TABLET, FILM COATED ORAL at 21:21

## 2025-02-11 RX ADMIN — GUAIFENESIN SYRUP AND DEXTROMETHORPHAN 5 ML: 100; 10 SYRUP ORAL at 21:21

## 2025-02-11 RX ADMIN — SODIUM CHLORIDE, PRESERVATIVE FREE 10 ML: 5 INJECTION INTRAVENOUS at 09:08

## 2025-02-11 RX ADMIN — ASPIRIN 81 MG: 81 TABLET, CHEWABLE ORAL at 09:07

## 2025-02-11 RX ADMIN — AMITRIPTYLINE HYDROCHLORIDE 25 MG: 50 TABLET ORAL at 23:24

## 2025-02-11 RX ADMIN — VANCOMYCIN HYDROCHLORIDE 125 MG: 125 CAPSULE ORAL at 16:05

## 2025-02-11 RX ADMIN — MAGNESIUM SULFATE HEPTAHYDRATE 2000 MG: 40 INJECTION, SOLUTION INTRAVENOUS at 09:15

## 2025-02-11 RX ADMIN — ROSUVASTATIN CALCIUM 40 MG: 20 TABLET, FILM COATED ORAL at 21:21

## 2025-02-11 RX ADMIN — SODIUM CHLORIDE, PRESERVATIVE FREE 5 ML: 5 INJECTION INTRAVENOUS at 21:17

## 2025-02-11 RX ADMIN — SOTALOL HYDROCHLORIDE 40 MG: 80 TABLET ORAL at 09:07

## 2025-02-11 RX ADMIN — FUROSEMIDE 20 MG: 10 INJECTION, SOLUTION INTRAMUSCULAR; INTRAVENOUS at 00:00

## 2025-02-11 RX ADMIN — GUAIFENESIN SYRUP AND DEXTROMETHORPHAN 5 ML: 100; 10 SYRUP ORAL at 16:05

## 2025-02-11 RX ADMIN — ACETAMINOPHEN 650 MG: 325 TABLET ORAL at 21:21

## 2025-02-11 RX ADMIN — VANCOMYCIN HYDROCHLORIDE 125 MG: 125 CAPSULE ORAL at 21:21

## 2025-02-11 RX ADMIN — SOTALOL HYDROCHLORIDE 40 MG: 80 TABLET ORAL at 21:22

## 2025-02-11 RX ADMIN — PANTOPRAZOLE SODIUM 40 MG: 40 TABLET, DELAYED RELEASE ORAL at 06:37

## 2025-02-11 RX ADMIN — APIXABAN 2.5 MG: 5 TABLET, FILM COATED ORAL at 09:07

## 2025-02-11 RX ADMIN — Medication 4.5 MG: at 23:23

## 2025-02-11 NOTE — CONSULTS
Consult    Patient: Jo Renee MRN: [id number]     YOB: 1940  Age: 84 y.o.  Sex: female      Subjective:      Jo Renee is a 84 y.o. female w/ a PMHx significant for a-fib w/ RVR without anticoagulation prior to her hospital stay who is being seen for the chief complaint of an episode of aphasia that lasted 30 minutes. Her last known normal was when she went to bed Saturday night. The patient's symptoms began early Sunday morning at 8:00 am when she awoke and noticed that she was very weak. The patient was unable to get out of bed; her  had to carry her from her bed. She was also experiencing aphasia at this time and stated that she knew the words she wanted to get out but could not get them out. She also disclosed that when she was able to get words out, the words did not make any sense. She denied having any focal motor deficits and states that she was \"weak all over\". The patient arrived to the ER at 9:00 am, but within 30 minutes of arriving, her symptoms had completely resolved. Today, the patient does not complain of motor deficits, sensory abnormalities, receptive aphasia, or expressive aphasia.     Past Medical History:   Diagnosis Date    Atrial fibrillation (HCC) 12/2/2014    Back pain 12/15/2014    CAD (coronary artery disease)     a-fib    Cardiac pacemaker 12/15/2014    Hypothyroidism     was previously on medication but it has been stopped    Insomnia 12/2/2014    Malaise and fatigue 12/21/2015    Oral thrush 9/16/2015    Pericardial effusion 12/15/2014    Pleuritic chest pain 1/8/2015    SSS (sick sinus syndrome) ([facility]) 12/21/2015     Past Surgical History:   Procedure Laterality Date    APPENDECTOMY      COLONOSCOPY  04/23/2007    HYSTERECTOMY (CERVIX STATUS UNKNOWN)      PACEMAKER  12/15/2014    PACEMAKER PLACEMENT  12/2014      Family History   Problem Relation Age of Onset    Cancer Brother         prostate    Stroke Father     Heart Disease Father     
Neurology Consult Note       History: The patient felt sick over the weekend and laid on the couch on Saturday for most of the day.  On Sunday she had 30 minutes of generalized weakness and paraphasic errors which persisted for 30 minutes.  Her symptoms went away.  She came to the emergency department for these symptoms.  She tested positive for COVID-19 and influenza.  In addition, she also has C. difficile.  Further, she was found to be in atrial fibrillation.  Currently, she feels well and has no major complaints      Exam: Pertinent positives and negatives include:      General - Well developed, well nourished, in no apparent distress. Pleasant and conversant.   HEENT - Normocephalic, atraumatic. Conjunctiva, tympanic membranes, and oropharynx are clear.   Neck - Supple without masses, no bruits   Cardiovascular -irregular rhythm.   Lungs - Clear to auscultation.  Abdomen - Soft, nontender with normal bowel sounds.   Extremities - Peripheral pulses intact. No edema and no rashes.     Neurological examination - Comprehension, attention , memory and reasoning are intact. Language and speech are normal. On cranial nerve examination pupils are equal round and reactive to light. Fundoscopic examination is normal. Visual acuity is adequate. Visual fields are full to finger confrontation. Extraocular motility is normal. Face is symmetric and sensation is intact to light touch. Hearing is intact to finger rustle bilaterally. Motor examination - There is normal muscle tone and bulk. Power is full throughout. Muscle stretch reflexes are normoactive and there are no pathological reflexes present. Sensation is intact to light touch, pinprick, vibration and proprioception in all extremities. Cerebellar examination is normal.         Assessment and Plan: 84-year-old woman with generalized weakness and transient paraphasic errors in the setting of COVID-19, influenza, C. difficile, and new onset atrial fibrillation.  
Ladi Littlejohn Masters, APRN - NP-C  [facility] CARDIOLOGY  Supervising Physician: Dr. Mandel

## 2025-02-11 NOTE — PROGRESS NOTES
ACUTE PHYSICAL THERAPY GOALS:   (Developed with and agreed upon by patient and/or caregiver.)  Pt will perform supine to/from sit with mod I in 7 treatment days.  Pt will perform sit to/from stand with mod I and LRAD in 7 treatment days.  Pt will ambulate 150 ft with mod I and LRAD in 7 treatment days.  Pt will be independent with HEP in 7 days.    PHYSICAL THERAPY: Daily Note AM   (Link to Caseload Tracking: PT Visit Days : 2  Time In/Out PT Charge Capture  Rehab Caseload Tracker  Orders    Jo Renee is a 84 y.o. female   PRIMARY DIAGNOSIS: TIA (transient ischemic attack)  TIA (transient ischemic attack) [G45.9]  Influenza A [J10.1]  Gait instability [R26.81]  COVID-19 [U07.1]       Observation: Payor: [employer] MEDICARE / Plan: [employer] GOLD PLUS HMO / Product Type: *No Product type* /     ASSESSMENT:     REHAB RECOMMENDATIONS:   Recommendation to date pending progress:  Setting:  Home Health Therapy    Equipment:    To Be Determined     ASSESSMENT:  Ms. Renee was sitting up in recliner chair upon contact and agreeable to PT. Patient performed sit to stand with SBA and ambulated 2 bouts x 120' without use of assistive device and no overt LOB noted. Patient took a seated rest break in between ambulation bouts. Patient then participated in LE exercises to BLE's with cues for improved quality of exercise. Wrote exercises down and encouraged patient to perform throughout the day. Patient verbalized understanding. Steady progress. Will continue PT efforts.     SUBJECTIVE:   Ms. Renee states, \"I want to get stronger\"     Social/Functional Lives With: Spouse  Type of Home: House  Home Layout: One level  Home Access: Ramped entrance  Home Equipment: None  Has the patient had two or more falls in the past year or any fall with injury in the past year?: Yes  Receives Help From: Family  Prior Level of Assist for ADLs: Independent  Prior Level of Assist for Homemaking: Independent  Prior Level of Assist for  Assistance, Max=Maximal Assistance, Total=Total Assistance, NT=Not Tested    PLAN:   FREQUENCY AND DURATION: 3 times/week for duration of hospital stay or until stated goals are met, whichever comes first.    TREATMENT:   TREATMENT:   Therapeutic Activity (23 Minutes): Therapeutic activity included Scooting, Transfer Training, Ambulation on level ground, Sitting balance , Standing balance, and LE exercises to improve functional Activity tolerance, Balance, Mobility, and Strength.    TREATMENT GRID:  N/A    AFTER TREATMENT PRECAUTIONS: Alarm Activated, Bed/Chair Locked, Call light within reach, Chair, Needs within reach, and RN notified    INTERDISCIPLINARY COLLABORATION:  RN/ PCT and PT/ PTA    EDUCATION:      TIME IN/OUT:  Time In: 0842  Time Out: 0905  Minutes: 23    Fany Nelson PTA

## 2025-02-11 NOTE — PROGRESS NOTES
Stroke Education provided to patient and the following topics were discussed    1. Patients personal risk factors for stroke are atrial fibrillation    2. Warning signs of Stroke:        * Sudden numbness or weakness of the face, arm or leg, especially on one side of          The body            * Sudden confusion, trouble speaking or understanding        * Sudden trouble seeing in one or both eyes        * Sudden trouble walking, dizziness, loss of balance or coordination        * Sudden severe headache with no known cause      3. Importance of activation Emergency Medical Services ( 9-1-1 ) immediately if experience any warning signs of stroke.    4. Be sure and schedule a follow-up appointment with your primary care doctor or any specialists as instructed.     5. You must take medicine every day to treat your risk factors for stroke.  Be sure to take your medicines exactly as your doctor tells you: no more, no less.  Know what your medicines are for , what they do.  Anti-thrombotics /anticoagulants can help prevent strokes.  You are taking the following medicine(s)  Amitriptyline, Eliquis, aspirin, melatonin, protonix, crestor, sotalol.    6.  Smoking and second-hand smoke greatly increase your risk of stroke, cardiovascular disease and death. Smoking history never    7. Information provided was [facility] Stroke Education Binder    8. Documentation of teaching completed in Patient Education Activity and on Care Plan with teaching response noted?  yes

## 2025-02-11 NOTE — CARE COORDINATION
CM met back with patient to obtain home health preference. [facility] reports [facility].  CM sent order t(SN, PT) to [facility] in [facility]. Awaiting acceptance.  Patient declines gabriel walker.   MRI cancelled because of patient's PM device.Remains on 2lpm O2. Will need Exercise with Oximetry test if unable to wean off.  [facility] accepted referral. CM selected [facility] in [facility].

## 2025-02-11 NOTE — PROGRESS NOTES
[facility] CARDIOLOGY PROGRESS NOTE           2/11/2025 11:37 AM    Admit Date: 2/9/2025      Subjective:   Patient denies any chest pain.  Feels weak but denies active dyspnea.  Remains in sinus rhythm.  Started on sotalol yesterday.  Has received 3 doses.  QTc acceptable.    ROS:  Cardiovascular:  As noted above    Objective:      Vitals:    02/11/25 0059 02/11/25 0346 02/11/25 0715 02/11/25 1131   BP:  103/88 (!) 116/50 (!) 100/44   Pulse:  87     Resp:  20 17    Temp: 98.1 °F (36.7 °C) 97.9 °F (36.6 °C) 97.7 °F (36.5 °C) 99.5 °F (37.5 °C)   TempSrc: Oral Oral Oral    SpO2:  93%  97%   Weight:  43 kg (94 lb 11.2 oz)     Height:           Physical Exam:  General-No Acute Distress  Neck- supple, no JVD  CV- regular rate and rhythm no MRG  Lung- clear bilaterally  Abd- soft, nontender, nondistended  Ext- no edema bilaterally.  Skin- warm and dry      Data Review:   Recent Labs     02/11/25  0556 02/10/25  0342 02/09/25 2025   WBC 3.6* 5.3 7.1   HGB 10.3* 10.1* 10.8*   HCT 32.0* 30.9* 33.8*   MCV 83.3 83.7 85.6    166 177       Recent Labs     02/11/25  0556 02/10/25  0342 02/09/25  1011      < > 134*   K 3.6   < > 3.4*      < > 99   CO2 22   < > 22   BUN 9   < > 11   CREATININE 0.64   < > 0.75   GLUCOSE 89   < > 105*   CALCIUM 8.0*   < > 8.6*   BILITOT  --   --  0.2   ALKPHOS  --   --  77   AST  --   --  27   ALT  --   --  15   LABGLOM 87   < > 79   GLOB  --   --  3.2    < > = values in this interval not displayed.       No results for input(s): \"CKTOTAL\", \"CKMB\", \"CKMBINDEX\", \"DDIMER\", \"TROPONINI\" in the last 720 hours.           Assessment/Plan:     Active Hospital Problems    Influenza A  Continue treatment per primary team.      *TIA (transient ischemic attack)  Continue treatment per primary team      COVID-19  Continue treatment per primary team      Atrial fibrillation with rapid ventricular response (HCC)  Started on low-dose sotalol therapy.  Follow QTc.  Will need 6 doses

## 2025-02-11 NOTE — PROGRESS NOTES
Upon 0000 vitals, patient was noted by PCT to be desatting as low as 83% on RA. Once awoken, patient's O2 sat was in the low 90s. Patient was noted to be tachypnic and lung sounds were coarse and fine crackles. RN notified Teri Reyna NP. Orders for IVP lasix 20mg.

## 2025-02-11 NOTE — PROGRESS NOTES
Hospitalist Progress Note   Admit Date:  2025 10:12 AM   Name:  Jo Renee   Age:  84 y.o.  Sex:  female  :  1940   MRN:  [id number]   Room:  [facility]    Presenting/Chief Complaint: Fatigue     Reason(s) for Admission: TIA (transient ischemic attack) [G45.9]  Influenza A [J10.1]  Gait instability [R26.81]  COVID-19 [U07.1]     Hospital Course:     Jo Renee is a 84 y.o. female with medical history of sick sinus syndrome status post pacemaker, hypothyroidism, hypertension, who presented to the ER with complaints of dizziness and weakness of 1 days duration.  Patient reports that Saturday morning when she woke up she began to feel weak.  Additionally reports cough that developed over this time.  Denies any sick contacts.  Reports that yesterday she began to have difficulty speaking in full sentences and noticed she was not making sense.  Also reports vertigo-like sensation at that time.  Denies any vision changes.  Denies any weakness on one side more than another.  In the ER patient was febrile at 100.5.  Heart rate 99.  EKG shows sinus rhythm with PAC.  Patient tested positive for COVID-19 and influenza.  Chest x-ray with no acute findings.  CT head with no evidence of acute intracranial abnormality.  There was no LVO on CTA head and neck.  There were scattered atherosclerotic changes.  Smooth irregularity of the distal cervical segment of the left internal carotid artery, question related to fibromuscular dysplasia.  7 mm calcified nodule in right upper lobe which is similar to prior exam.  Left paratracheal lymphadenopathy worse compared to CT 2021 and radiology recommended routine follow-up CT chest to evaluate for chronic granulomatous disease.  ER recommended admission for TIA workup.     Overnight  patient went into A-fib with RVR. Cardiology consulted who started Eliquis and sotalol. Heart rate improved with intervention.     Subjective & 24hr Events:     First    Result Value Ref Range    C. difficile toxin Molecular Positive (AA) NEG     EKG 12 Lead    Collection Time: 02/11/25 11:20 AM   Result Value Ref Range    Ventricular Rate 66 BPM    Atrial Rate 66 BPM    P-R Interval 152 ms    QRS Duration 76 ms    Q-T Interval 432 ms    QTc Calculation (Bazett) 452 ms    P Axis 85 degrees    R Axis 100 degrees    T Axis 38 degrees    Diagnosis       Normal sinus rhythm  Rightward axis  Septal infarct (cited on or before 11-FEB-2025)  Abnormal ECG  When compared with ECG of 10-FEB-2025 23:15,  Premature atrial complexes are no longer Present         Recent Labs     02/09/25  1012   COVID19 Detected*       Current Meds:  Current Facility-Administered Medications   Medication Dose Route Frequency    acetaminophen (TYLENOL) tablet 650 mg  650 mg Oral Q6H PRN    melatonin tablet 3 mg  3 mg Oral Nightly PRN    sotalol (BETAPACE) tablet 40 mg  40 mg Oral Q12H    apixaban (ELIQUIS) tablet 2.5 mg  2.5 mg Oral BID    amitriptyline (ELAVIL) tablet 25 mg  25 mg Oral Nightly    pantoprazole (PROTONIX) tablet 40 mg  40 mg Oral QAM AC    melatonin tablet 4.5 mg  4.5 mg Oral Nightly    sodium chloride flush 0.9 % injection 5-40 mL  5-40 mL IntraVENous 2 times per day    sodium chloride flush 0.9 % injection 5-40 mL  5-40 mL IntraVENous PRN    0.9 % sodium chloride infusion   IntraVENous PRN    ondansetron (ZOFRAN-ODT) disintegrating tablet 4 mg  4 mg Oral Q8H PRN    Or    ondansetron (ZOFRAN) injection 4 mg  4 mg IntraVENous Q6H PRN    polyethylene glycol (GLYCOLAX) packet 17 g  17 g Oral Daily PRN    rosuvastatin (CRESTOR) tablet 40 mg  40 mg Oral Nightly    aspirin chewable tablet 81 mg  81 mg Oral Daily    Or    aspirin suppository 300 mg  300 mg Rectal Daily    guaiFENesin-dextromethorphan (ROBITUSSIN DM) 100-10 MG/5ML syrup 5 mL  5 mL Oral Q4H PRN    potassium chloride (KLOR-CON M) extended release tablet 40 mEq  40 mEq Oral PRN    Or    potassium bicarb-citric acid (EFFER-K) effervescent

## 2025-02-11 NOTE — PROGRESS NOTES
02/11/25 1439   Resting (Room Air)   SpO2 92   HR 73   During Walk (Room Air)   SpO2 94   HR 70   Walk/Assistance Device Ambulation   Rate of Dyspnea 0   After Walk   SpO2 93   Rate of Dyspnea 0   Does the Patient Qualify for Home O2 No   Does the Patient Need Portable Oxygen Tanks No

## 2025-02-12 LAB
ANION GAP SERPL CALC-SCNC: 10 MMOL/L (ref 7–16)
BASOPHILS # BLD: 0.01 K/UL (ref 0–0.2)
BASOPHILS NFR BLD: 0.3 % (ref 0–2)
BUN SERPL-MCNC: 11 MG/DL (ref 8–23)
CALCIUM SERPL-MCNC: 7.6 MG/DL (ref 8.8–10.2)
CHLORIDE SERPL-SCNC: 103 MMOL/L (ref 98–107)
CO2 SERPL-SCNC: 23 MMOL/L (ref 20–29)
CREAT SERPL-MCNC: 0.7 MG/DL (ref 0.6–1.1)
DIFFERENTIAL METHOD BLD: ABNORMAL
EKG ATRIAL RATE: 65 BPM
EKG ATRIAL RATE: 66 BPM
EKG DIAGNOSIS: NORMAL
EKG DIAGNOSIS: NORMAL
EKG P AXIS: 54 DEGREES
EKG P AXIS: 81 DEGREES
EKG P-R INTERVAL: 176 MS
EKG P-R INTERVAL: 210 MS
EKG Q-T INTERVAL: 420 MS
EKG Q-T INTERVAL: 430 MS
EKG QRS DURATION: 80 MS
EKG QRS DURATION: 86 MS
EKG QTC CALCULATION (BAZETT): 436 MS
EKG QTC CALCULATION (BAZETT): 450 MS
EKG R AXIS: 41 DEGREES
EKG R AXIS: 60 DEGREES
EKG T AXIS: 22 DEGREES
EKG T AXIS: 42 DEGREES
EKG VENTRICULAR RATE: 65 BPM
EKG VENTRICULAR RATE: 66 BPM
EOSINOPHIL # BLD: 0.03 K/UL (ref 0–0.8)
EOSINOPHIL NFR BLD: 1 % (ref 0.5–7.8)
ERYTHROCYTE [DISTWIDTH] IN BLOOD BY AUTOMATED COUNT: 15.6 % (ref 11.9–14.6)
GLUCOSE SERPL-MCNC: 94 MG/DL (ref 70–99)
HCT VFR BLD AUTO: 33.1 % (ref 35.8–46.3)
HGB BLD-MCNC: 10.5 G/DL (ref 11.7–15.4)
IMM GRANULOCYTES # BLD AUTO: 0.01 K/UL (ref 0–0.5)
IMM GRANULOCYTES NFR BLD AUTO: 0.3 % (ref 0–5)
LYMPHOCYTES # BLD: 0.85 K/UL (ref 0.5–4.6)
LYMPHOCYTES NFR BLD: 29.2 % (ref 13–44)
MAGNESIUM SERPL-MCNC: 2 MG/DL (ref 1.8–2.4)
MCH RBC QN AUTO: 27.1 PG (ref 26.1–32.9)
MCHC RBC AUTO-ENTMCNC: 31.7 G/DL (ref 31.4–35)
MCV RBC AUTO: 85.3 FL (ref 82–102)
MONOCYTES # BLD: 0.38 K/UL (ref 0.1–1.3)
MONOCYTES NFR BLD: 13.1 % (ref 4–12)
NEUTS SEG # BLD: 1.63 K/UL (ref 1.7–8.2)
NEUTS SEG NFR BLD: 56.1 % (ref 43–78)
NRBC # BLD: 0 K/UL (ref 0–0.2)
PLATELET # BLD AUTO: 174 K/UL (ref 150–450)
PMV BLD AUTO: 10.5 FL (ref 9.4–12.3)
POTASSIUM SERPL-SCNC: 3.5 MMOL/L (ref 3.5–5.1)
RBC # BLD AUTO: 3.88 M/UL (ref 4.05–5.2)
SODIUM SERPL-SCNC: 136 MMOL/L (ref 136–145)
WBC # BLD AUTO: 2.9 K/UL (ref 4.3–11.1)

## 2025-02-12 PROCEDURE — 6370000000 HC RX 637 (ALT 250 FOR IP)

## 2025-02-12 PROCEDURE — 2580000003 HC RX 258: Performed by: NURSE PRACTITIONER

## 2025-02-12 PROCEDURE — 80048 BASIC METABOLIC PNL TOTAL CA: CPT

## 2025-02-12 PROCEDURE — 6370000000 HC RX 637 (ALT 250 FOR IP): Performed by: NURSE PRACTITIONER

## 2025-02-12 PROCEDURE — 97530 THERAPEUTIC ACTIVITIES: CPT

## 2025-02-12 PROCEDURE — 36415 COLL VENOUS BLD VENIPUNCTURE: CPT

## 2025-02-12 PROCEDURE — 6370000000 HC RX 637 (ALT 250 FOR IP): Performed by: INTERNAL MEDICINE

## 2025-02-12 PROCEDURE — 2500000003 HC RX 250 WO HCPCS

## 2025-02-12 PROCEDURE — 99232 SBSQ HOSP IP/OBS MODERATE 35: CPT | Performed by: INTERNAL MEDICINE

## 2025-02-12 PROCEDURE — 93010 ELECTROCARDIOGRAM REPORT: CPT | Performed by: INTERNAL MEDICINE

## 2025-02-12 PROCEDURE — 93005 ELECTROCARDIOGRAM TRACING: CPT | Performed by: INTERNAL MEDICINE

## 2025-02-12 PROCEDURE — 83735 ASSAY OF MAGNESIUM: CPT

## 2025-02-12 PROCEDURE — 85025 COMPLETE CBC W/AUTO DIFF WBC: CPT

## 2025-02-12 PROCEDURE — 1100000003 HC PRIVATE W/ TELEMETRY

## 2025-02-12 RX ORDER — 0.9 % SODIUM CHLORIDE 0.9 %
500 INTRAVENOUS SOLUTION INTRAVENOUS ONCE
Status: COMPLETED | OUTPATIENT
Start: 2025-02-12 | End: 2025-02-12

## 2025-02-12 RX ADMIN — GUAIFENESIN SYRUP AND DEXTROMETHORPHAN 5 ML: 100; 10 SYRUP ORAL at 21:19

## 2025-02-12 RX ADMIN — VANCOMYCIN HYDROCHLORIDE 125 MG: 125 CAPSULE ORAL at 13:25

## 2025-02-12 RX ADMIN — SOTALOL HYDROCHLORIDE 40 MG: 80 TABLET ORAL at 21:14

## 2025-02-12 RX ADMIN — AMITRIPTYLINE HYDROCHLORIDE 25 MG: 50 TABLET ORAL at 23:18

## 2025-02-12 RX ADMIN — VANCOMYCIN HYDROCHLORIDE 125 MG: 125 CAPSULE ORAL at 08:58

## 2025-02-12 RX ADMIN — SODIUM CHLORIDE, PRESERVATIVE FREE 10 ML: 5 INJECTION INTRAVENOUS at 09:00

## 2025-02-12 RX ADMIN — ASPIRIN 81 MG: 81 TABLET, CHEWABLE ORAL at 08:58

## 2025-02-12 RX ADMIN — VANCOMYCIN HYDROCHLORIDE 125 MG: 125 CAPSULE ORAL at 21:14

## 2025-02-12 RX ADMIN — APIXABAN 2.5 MG: 5 TABLET, FILM COATED ORAL at 08:58

## 2025-02-12 RX ADMIN — Medication 4.5 MG: at 23:17

## 2025-02-12 RX ADMIN — ACETAMINOPHEN 650 MG: 325 TABLET ORAL at 21:16

## 2025-02-12 RX ADMIN — SOTALOL HYDROCHLORIDE 40 MG: 80 TABLET ORAL at 08:58

## 2025-02-12 RX ADMIN — SODIUM CHLORIDE, PRESERVATIVE FREE 5 ML: 5 INJECTION INTRAVENOUS at 21:17

## 2025-02-12 RX ADMIN — SODIUM CHLORIDE 500 ML: 9 INJECTION, SOLUTION INTRAVENOUS at 21:44

## 2025-02-12 RX ADMIN — VANCOMYCIN HYDROCHLORIDE 125 MG: 125 CAPSULE ORAL at 16:34

## 2025-02-12 RX ADMIN — PANTOPRAZOLE SODIUM 40 MG: 40 TABLET, DELAYED RELEASE ORAL at 05:19

## 2025-02-12 RX ADMIN — APIXABAN 2.5 MG: 5 TABLET, FILM COATED ORAL at 21:16

## 2025-02-12 RX ADMIN — ROSUVASTATIN CALCIUM 40 MG: 20 TABLET, FILM COATED ORAL at 21:15

## 2025-02-12 NOTE — PROGRESS NOTES
Physician Progress Note      PATIENT:               GEOVANNA RODRIGUEZ  [facility] #:                  [id number]  :                       1940  ADMIT DATE:       2025 10:12 AM  DISCH DATE:  RESPONDING  PROVIDER #:        Chencho House MD          QUERY TEXT:    Patient admitted with new onset afib with RVR and was started on and to be   maintained on Eliquis. If possible, please document in progress notes and   discharge summary if you are evaluating and/or treating any of the following:?  ?  The medical record reflects the following:    Risk Factors: 83 yo female  Clinical Indicators: new onset afib with RVR, started on and being maintained   on Eliquis  Treatment: bleeding precautions  Options provided:  -- Secondary hypercoagulable state in a patient with atrial fibrillation  -- Other - I will add my own diagnosis  -- Disagree - Not applicable / Not valid  -- Disagree - Clinically unable to determine / Unknown  -- Refer to Clinical Documentation Reviewer    PROVIDER RESPONSE TEXT:    This patient has secondary hypercoagulable state in a patient with atrial   fibrillation.    Query created by: Dat López on 2025 4:44 PM      QUERY TEXT:    Pt admitted with TIA. Pt noted to have new onset afib and also noted to have   Covid and Influenza A. If possible, please document in progress notes and   discharge summary if you are evaluating and /or treating any of the following:    The medical record reflects the following:    Risk Factors: Afib, Covid, Influenza  Clinical Indicators: presents with severe fatigue beginning yesterday along   with a cough and some nasal congestion and also noted to have some aphasia   that resolved, pt noted to have new onset afib, per neuro note \"his may have   been a TIA, especially in the setting of new onset atrial fibrillation.\"  Treatment: Cardizem, Eliquis, IVF, neurology consult  Options provided:  -- Embolic TIA due to cerebral embolism due to afib  -- TIA symptoms

## 2025-02-12 NOTE — PROGRESS NOTES
[facility] CARDIOLOGY PROGRESS NOTE           2/12/2025 8:17 AM    Admit Date: 2/9/2025      Subjective:   Patient denies any chest pain.  Feels weak but denies active dyspnea.  Remains in sinus rhythm.  Started on sotalol yesterday.  Has received 5 doses.  QTc acceptable.    ROS:  Cardiovascular:  As noted above    Objective:      Vitals:    02/12/25 0012 02/12/25 0407 02/12/25 0519 02/12/25 0735   BP: (!) 108/46 (!) 107/46  (!) 109/57   Pulse: 65 64 60 65   Resp: 16 16  16   Temp: 97.9 °F (36.6 °C) 98.1 °F (36.7 °C)  97.7 °F (36.5 °C)   TempSrc: Oral Oral  Oral   SpO2: 93% 92%  92%   Weight:  43.2 kg (95 lb 4.8 oz)     Height:           Physical Exam:  General-No Acute Distress  Neck- supple, no JVD  CV- regular rate and rhythm no MRG  Lung- clear bilaterally  Abd- soft, nontender, nondistended  Ext- no edema bilaterally.  Skin- warm and dry      Data Review:   Recent Labs     02/12/25  0551 02/11/25  0556 02/10/25  0342   WBC 2.9* 3.6* 5.3   HGB 10.5* 10.3* 10.1*   HCT 33.1* 32.0* 30.9*   MCV 85.3 83.3 83.7    170 166       Recent Labs     02/12/25  0551 02/10/25  0342 02/09/25  1011      < > 134*   K 3.5   < > 3.4*      < > 99   CO2 23   < > 22   BUN 11   < > 11   CREATININE 0.70   < > 0.75   GLUCOSE 94   < > 105*   CALCIUM 7.6*   < > 8.6*   BILITOT  --   --  0.2   ALKPHOS  --   --  77   AST  --   --  27   ALT  --   --  15   LABGLOM 85   < > 79   GLOB  --   --  3.2    < > = values in this interval not displayed.       No results for input(s): \"CKTOTAL\", \"CKMB\", \"CKMBINDEX\", \"DDIMER\", \"TROPONINI\" in the last 720 hours.           Assessment/Plan:     Active Hospital Problems    Influenza A  Continue treatment per primary team.      *TIA (transient ischemic attack)  Continue treatment per primary team      COVID-19  Continue treatment per primary team      Atrial fibrillation with rapid ventricular response (HCC)  Started on low-dose sotalol therapy.  Follow QTc.  Will need 6 doses

## 2025-02-12 NOTE — PLAN OF CARE
Problem: Discharge Planning  Goal: Discharge to home or other facility with appropriate resources  2/12/2025 1339 by eHnny Vargas RN  Outcome: Progressing  Flowsheets (Taken 2/12/2025 0858)  Discharge to home or other facility with appropriate resources:   Identify barriers to discharge with patient and caregiver   Arrange for needed discharge resources and transportation as appropriate   Identify discharge learning needs (meds, wound care, etc)   Refer to discharge planning if patient needs post-hospital services based on physician order or complex needs related to functional status, cognitive ability or social support system  2/12/2025 0150 by Bernice Kasper RN  Outcome: Progressing     Problem: Safety - Adult  Goal: Free from fall injury  2/12/2025 1339 by Henny Vargas RN  Outcome: Progressing  2/12/2025 0150 by Bernice Kasper RN  Outcome: Progressing     Problem: ABCDS Injury Assessment  Goal: Absence of physical injury  2/12/2025 1339 by Henny Vargas RN  Outcome: Progressing  2/12/2025 0150 by Bernice Kasper RN  Outcome: Progressing     Problem: Pain  Goal: Verbalizes/displays adequate comfort level or baseline comfort level  2/12/2025 1339 by Henny Vargas RN  Outcome: Progressing  2/12/2025 0150 by Bernice Kasper RN  Outcome: Progressing

## 2025-02-12 NOTE — PROGRESS NOTES
0.02 0.0 - 0.5 K/UL    RBC Comment SLIGHT  ANISOCYTOSIS + POIKILOCYTOSIS        WBC Comment Result Confirmed By Smear      Platelet Comment ADEQUATE      Differential Type AUTOMATED     Basic Metabolic Panel    Collection Time: 02/11/25  5:56 AM   Result Value Ref Range    Sodium 136 136 - 145 mmol/L    Potassium 3.6 3.5 - 5.1 mmol/L    Chloride 103 98 - 107 mmol/L    CO2 22 20 - 29 mmol/L    Anion Gap 11 7 - 16 mmol/L    Glucose 89 70 - 99 mg/dL    BUN 9 8 - 23 MG/DL    Creatinine 0.64 0.60 - 1.10 MG/DL    Est, Glom Filt Rate 87 >60 ml/min/1.73m2    Calcium 8.0 (L) 8.8 - 10.2 MG/DL   Magnesium    Collection Time: 02/11/25  5:56 AM   Result Value Ref Range    Magnesium 1.6 (L) 1.8 - 2.4 mg/dL   C. difficile toxin Molecular    Collection Time: 02/11/25  9:57 AM    Specimen: Stool    Stool specimen~Specimen from patient   Result Value Ref Range    C. difficile toxin Molecular Positive (AA) NEG     Clostridium Difficile Toxin/Antigen    Collection Time: 02/11/25  9:57 AM    Specimen: Stool   Result Value Ref Range    GDH Antigen C. DIFFICILE GDH ANTIGEN-NEGATIVE NGDH      C difficile Toxin, EIA C. DIFFICILE TOXIN-NEGATIVE NTOX      Reflex PCR-POSITIVE      C Diff Toxin Interpretation POSITIVE FOR TOXIGENIC C. DIFFICILE (AA) NTXCD     EKG 12 Lead    Collection Time: 02/11/25 11:20 AM   Result Value Ref Range    Ventricular Rate 66 BPM    Atrial Rate 66 BPM    P-R Interval 152 ms    QRS Duration 76 ms    Q-T Interval 432 ms    QTc Calculation (Bazett) 452 ms    P Axis 85 degrees    R Axis 100 degrees    T Axis 38 degrees    Diagnosis       Normal sinus rhythm  Rightward axis  Possible  Septal infarct (cited on or before 11-FEB-2025)  Abnormal ECG  When compared with ECG of 10-FEB-2025 23:15,  Premature atrial complexes are no longer Present  Confirmed by MD CODY (), KEN ([id number]) on 2/11/2025 11:58:53 AM     Magnesium    Collection Time: 02/11/25  4:02 PM   Result Value Ref Range    Magnesium 2.2 1.8 - 2.4 mg/dL    EKG 12 Lead    Collection Time: 02/11/25 11:32 PM   Result Value Ref Range    Ventricular Rate 66 BPM    Atrial Rate 66 BPM    P-R Interval 210 ms    QRS Duration 86 ms    Q-T Interval 430 ms    QTc Calculation (Bazett) 450 ms    P Axis 54 degrees    R Axis 41 degrees    T Axis 22 degrees    Diagnosis       Atrial-paced rhythm with prolonged AV conduction with occasional Premature   ventricular complexes  Poor R wave progression consider  Septal infarct (cited on or before   11-FEB-2025)  Abnormal ECG  When compared with ECG of 11-FEB-2025 11:20,  Electronic atrial pacemaker has replaced Sinus rhythm  Questionable change in QRS axis  Inverted T waves have replaced nonspecific T wave abnormality in Inferior   leads  Confirmed by CAMILA TRUONG ()ERMIAS ([id number]) on 2/12/2025 6:18:12 AM     Basic Metabolic Panel    Collection Time: 02/12/25  5:51 AM   Result Value Ref Range    Sodium 136 136 - 145 mmol/L    Potassium 3.5 3.5 - 5.1 mmol/L    Chloride 103 98 - 107 mmol/L    CO2 23 20 - 29 mmol/L    Anion Gap 10 7 - 16 mmol/L    Glucose 94 70 - 99 mg/dL    BUN 11 8 - 23 MG/DL    Creatinine 0.70 0.60 - 1.10 MG/DL    Est, Glom Filt Rate 85 >60 ml/min/1.73m2    Calcium 7.6 (L) 8.8 - 10.2 MG/DL   Magnesium    Collection Time: 02/12/25  5:51 AM   Result Value Ref Range    Magnesium 2.0 1.8 - 2.4 mg/dL   CBC with Auto Differential    Collection Time: 02/12/25  5:51 AM   Result Value Ref Range    WBC 2.9 (L) 4.3 - 11.1 K/uL    RBC 3.88 (L) 4.05 - 5.2 M/uL    Hemoglobin 10.5 (L) 11.7 - 15.4 g/dL    Hematocrit 33.1 (L) 35.8 - 46.3 %    MCV 85.3 82 - 102 FL    MCH 27.1 26.1 - 32.9 PG    MCHC 31.7 31.4 - 35.0 g/dL    RDW 15.6 (H) 11.9 - 14.6 %    Platelets 174 150 - 450 K/uL    MPV 10.5 9.4 - 12.3 FL    nRBC 0.00 0.0 - 0.2 K/uL    Differential Type AUTOMATED      Neutrophils % 56.1 43.0 - 78.0 %    Lymphocytes % 29.2 13.0 - 44.0 %    Monocytes % 13.1 (H) 4.0 - 12.0 %    Eosinophils % 1.0 0.5 - 7.8 %    Basophils % 0.3 0.0 - 2.0

## 2025-02-12 NOTE — PROGRESS NOTES
ACUTE PHYSICAL THERAPY GOALS:   (Developed with and agreed upon by patient and/or caregiver.)  Pt will perform supine to/from sit with mod I in 7 treatment days.  Pt will perform sit to/from stand with mod I and LRAD in 7 treatment days.  Pt will ambulate 150 ft with mod I and LRAD in 7 treatment days.  Pt will be independent with HEP in 7 days.    PHYSICAL THERAPY: Daily Note AM   (Link to Caseload Tracking: PT Visit Days : 3  Time In/Out PT Charge Capture  Rehab Caseload Tracker  Orders    Jo Renee is a 84 y.o. female   PRIMARY DIAGNOSIS: TIA (transient ischemic attack)  TIA (transient ischemic attack) [G45.9]  Influenza A [J10.1]  Gait instability [R26.81]  COVID-19 [U07.1]  Atrial fibrillation with RVR (HCC) [I48.91]       Inpatient: Payor: [employer] MEDICARE / Plan: [employer] GOLD PLUS HMO / Product Type: *No Product type* /     ASSESSMENT:     REHAB RECOMMENDATIONS:   Recommendation to date pending progress:  Setting:  Home Health Therapy    Equipment:    To Be Determined     ASSESSMENT:  Ms. Renee continues to make good progress towards PT goals as noted by increased gait distance, activity/exercise tolerance, and overall improved mobility. Reviewed activity pacing ,energy conservation techniques and pursed lipped breathing with patient who verbalized understanding. Steady progress. Will continue PT efforts.     SUBJECTIVE:   Ms. Renee states, \"I like stews when it's cold\"     Social/Functional Lives With: Spouse  Type of Home: House  Home Layout: One level  Home Access: Ramped entrance  Home Equipment: None  Has the patient had two or more falls in the past year or any fall with injury in the past year?: Yes  Receives Help From: Family  Prior Level of Assist for ADLs: Independent  Prior Level of Assist for Homemaking: Independent  Prior Level of Assist for Ambulation: Independent community ambulator, with or without device  Prior Level of Assist for Transfers: Independent  Active :  Yes  Mode of Transportation: Car  Occupation: Retired  Additional Comments: independent and active, no AD, no falls, lives w/ spouse, WIS w/ shower chair and grab bars, 1L home  OBJECTIVE:     PAIN: VITALS / O2: PRECAUTION / LINES / DRAINS:   Pre Treatment: 0         Post Treatment: 0 Vitals        Oxygen    None    RESTRICTIONS/PRECAUTIONS:  Restrictions/Precautions  Restrictions/Precautions: Fall Risk, Isolation  Restrictions/Precautions: Fall Risk, Isolation     MOBILITY: I Mod I S SBA CGA Min Mod Max Total  NT x2 Comments:   Bed Mobility    Rolling [] [] [] [] [] [] [] [] [] [] []    Supine to Sit [] [] [x] [] [] [] [] [] [] [] []    Scooting [] [] [] [] [] [] [] [] [] [] []    Sit to Supine [] [] [] [] [] [] [] [] [] [] []    Transfers    Sit to Stand [] [] [x] [x] [] [] [] [] [] [] []    Bed to Chair [] [] [] [x] [x] [] [] [] [] [] []    Stand to Sit [] [] [x] [x] [] [] [] [] [] [] []     [] [] [] [] [] [] [] [] [] [] []    I=Independent, Mod I=Modified Independent, S=Supervision, SBA=Standby Assistance, CGA=Contact Guard Assistance,   Min=Minimal Assistance, Mod=Moderate Assistance, Max=Maximal Assistance, Total=Total Assistance, NT=Not Tested    BALANCE: Good Fair+ Fair Fair- Poor NT Comments   Sitting Static [x] [] [] [] [] []    Sitting Dynamic [x] [] [] [] [] []              Standing Static [] [x] [] [] [] []    Standing Dynamic [] [] [x] [] [] []      GAIT: I Mod I S SBA CGA Min Mod Max Total  NT x2 Comments:   Level of Assistance [] [] [] [x] [x] [] [] [] [] [] [] Seated rest break in between   Distance   150' then 120'    DME None    Gait Quality Decreased joceline , Decreased step clearance, and Decreased step length    Weightbearing Status      Stairs      I=Independent, Mod I=Modified Independent, S=Supervision, SBA=Standby Assistance, CGA=Contact Guard Assistance,   Min=Minimal Assistance, Mod=Moderate Assistance, Max=Maximal Assistance, Total=Total Assistance, NT=Not Tested    PLAN:   FREQUENCY AND

## 2025-02-13 ENCOUNTER — TELEPHONE (OUTPATIENT)
Age: 85
End: 2025-02-13

## 2025-02-13 VITALS
HEIGHT: 59 IN | RESPIRATION RATE: 16 BRPM | HEART RATE: 60 BPM | WEIGHT: 95.3 LBS | SYSTOLIC BLOOD PRESSURE: 115 MMHG | OXYGEN SATURATION: 95 % | BODY MASS INDEX: 19.21 KG/M2 | TEMPERATURE: 97.3 F | DIASTOLIC BLOOD PRESSURE: 74 MMHG

## 2025-02-13 LAB
ANION GAP SERPL CALC-SCNC: 9 MMOL/L (ref 7–16)
BUN SERPL-MCNC: 8 MG/DL (ref 8–23)
CALCIUM SERPL-MCNC: 8.1 MG/DL (ref 8.8–10.2)
CHLORIDE SERPL-SCNC: 108 MMOL/L (ref 98–107)
CO2 SERPL-SCNC: 22 MMOL/L (ref 20–29)
CREAT SERPL-MCNC: 0.53 MG/DL (ref 0.6–1.1)
EKG ATRIAL RATE: 69 BPM
EKG DIAGNOSIS: NORMAL
EKG P AXIS: 73 DEGREES
EKG P-R INTERVAL: 160 MS
EKG Q-T INTERVAL: 414 MS
EKG QRS DURATION: 90 MS
EKG QTC CALCULATION (BAZETT): 443 MS
EKG R AXIS: 38 DEGREES
EKG T AXIS: 27 DEGREES
EKG VENTRICULAR RATE: 69 BPM
GLUCOSE SERPL-MCNC: 98 MG/DL (ref 70–99)
MAGNESIUM SERPL-MCNC: 1.8 MG/DL (ref 1.8–2.4)
POTASSIUM SERPL-SCNC: 3.3 MMOL/L (ref 3.5–5.1)
SODIUM SERPL-SCNC: 139 MMOL/L (ref 136–145)

## 2025-02-13 PROCEDURE — 99232 SBSQ HOSP IP/OBS MODERATE 35: CPT | Performed by: INTERNAL MEDICINE

## 2025-02-13 PROCEDURE — 6370000000 HC RX 637 (ALT 250 FOR IP): Performed by: INTERNAL MEDICINE

## 2025-02-13 PROCEDURE — 97530 THERAPEUTIC ACTIVITIES: CPT

## 2025-02-13 PROCEDURE — 80048 BASIC METABOLIC PNL TOTAL CA: CPT

## 2025-02-13 PROCEDURE — 6370000000 HC RX 637 (ALT 250 FOR IP)

## 2025-02-13 PROCEDURE — 2500000003 HC RX 250 WO HCPCS

## 2025-02-13 PROCEDURE — 36415 COLL VENOUS BLD VENIPUNCTURE: CPT

## 2025-02-13 PROCEDURE — 83735 ASSAY OF MAGNESIUM: CPT

## 2025-02-13 PROCEDURE — 93010 ELECTROCARDIOGRAM REPORT: CPT | Performed by: INTERNAL MEDICINE

## 2025-02-13 RX ORDER — VANCOMYCIN HYDROCHLORIDE 125 MG/1
125 CAPSULE ORAL 4 TIMES DAILY
Qty: 33 CAPSULE | Refills: 0 | Status: SHIPPED | OUTPATIENT
Start: 2025-02-13 | End: 2025-02-21

## 2025-02-13 RX ORDER — SOTALOL HYDROCHLORIDE 80 MG/1
40 TABLET ORAL EVERY 12 HOURS
Qty: 60 TABLET | Refills: 1 | Status: SHIPPED | OUTPATIENT
Start: 2025-02-13

## 2025-02-13 RX ADMIN — PANTOPRAZOLE SODIUM 40 MG: 40 TABLET, DELAYED RELEASE ORAL at 05:53

## 2025-02-13 RX ADMIN — SOTALOL HYDROCHLORIDE 40 MG: 80 TABLET ORAL at 08:40

## 2025-02-13 RX ADMIN — VANCOMYCIN HYDROCHLORIDE 125 MG: 125 CAPSULE ORAL at 08:40

## 2025-02-13 RX ADMIN — APIXABAN 2.5 MG: 5 TABLET, FILM COATED ORAL at 08:40

## 2025-02-13 RX ADMIN — POTASSIUM CHLORIDE 40 MEQ: 1500 TABLET, EXTENDED RELEASE ORAL at 07:07

## 2025-02-13 RX ADMIN — SODIUM CHLORIDE, PRESERVATIVE FREE 10 ML: 5 INJECTION INTRAVENOUS at 08:42

## 2025-02-13 NOTE — TELEPHONE ENCOUNTER
Called and spoke with patient and informed her that she could try [facility] in [place] for a reduced Eliquis price. Patient stated that she would call [facility] and set up an account. Patient advised to call our office after she set up an account and we would send in an Rx to them for 3 months. Patient voiced understanding.

## 2025-02-13 NOTE — TELEPHONE ENCOUNTER
Patient called stating she has the following issue :    Rx for apixaban (ELIQUIS) 2.5 MG TABS tablet   Sending to [facility] / [place]  Pt ID#  [id number]    Please call and advise if need be.

## 2025-02-13 NOTE — PLAN OF CARE
Problem: Discharge Planning  Goal: Discharge to home or other facility with appropriate resources  2/13/2025 0940 by Henny Vargas RN  Outcome: Progressing  Flowsheets (Taken 2/13/2025 0840)  Discharge to home or other facility with appropriate resources:   Identify barriers to discharge with patient and caregiver   Arrange for needed discharge resources and transportation as appropriate   Identify discharge learning needs (meds, wound care, etc)   Refer to discharge planning if patient needs post-hospital services based on physician order or complex needs related to functional status, cognitive ability or social support system  2/13/2025 0212 by Bernice Kasper RN  Outcome: Progressing     Problem: Safety - Adult  Goal: Free from fall injury  2/13/2025 0940 by Henny Vargas RN  Outcome: Progressing  2/13/2025 0212 by Bernice Kasper RN  Outcome: Progressing     Problem: ABCDS Injury Assessment  Goal: Absence of physical injury  2/13/2025 0940 by Henny Vargas RN  Outcome: Progressing  2/13/2025 0212 by Bernice Kasper RN  Outcome: Progressing     Problem: Pain  Goal: Verbalizes/displays adequate comfort level or baseline comfort level  2/13/2025 0940 by Henny Vargas RN  Outcome: Progressing  Flowsheets (Taken 2/13/2025 0840)  Verbalizes/displays adequate comfort level or baseline comfort level: Encourage patient to monitor pain and request assistance  2/13/2025 0212 by Bernice Kasper RN  Outcome: Progressing

## 2025-02-13 NOTE — PLAN OF CARE
Problem: Discharge Planning  Goal: Discharge to home or other facility with appropriate resources  2/13/2025 0955 by Alexa Cerrato RN  Outcome: Adequate for Discharge  2/13/2025 0940 by Henny Vargas RN  Outcome: Progressing  Flowsheets (Taken 2/13/2025 0840)  Discharge to home or other facility with appropriate resources:   Identify barriers to discharge with patient and caregiver   Arrange for needed discharge resources and transportation as appropriate   Identify discharge learning needs (meds, wound care, etc)   Refer to discharge planning if patient needs post-hospital services based on physician order or complex needs related to functional status, cognitive ability or social support system  2/13/2025 0212 by Bernice Kasper RN  Outcome: Progressing     Problem: Safety - Adult  Goal: Free from fall injury  2/13/2025 0955 by Alexa Cerrato RN  Outcome: Adequate for Discharge  2/13/2025 0940 by Henny Vargas RN  Outcome: Progressing  2/13/2025 0212 by Bernice Kasper RN  Outcome: Progressing     Problem: ABCDS Injury Assessment  Goal: Absence of physical injury  2/13/2025 0955 by Alexa Cerrato RN  Outcome: Adequate for Discharge  2/13/2025 0940 by Henny Vargas RN  Outcome: Progressing  2/13/2025 0212 by Bernice Kasper RN  Outcome: Progressing     Problem: Pain  Goal: Verbalizes/displays adequate comfort level or baseline comfort level  2/13/2025 0955 by Alexa Cerrato RN  Outcome: Adequate for Discharge  2/13/2025 0940 by Henny Vargas RN  Outcome: Progressing  Flowsheets (Taken 2/13/2025 0840)  Verbalizes/displays adequate comfort level or baseline comfort level: Encourage patient to monitor pain and request assistance  2/13/2025 0212 by Bernice Kasper RN  Outcome: Progressing

## 2025-02-13 NOTE — DISCHARGE SUMMARY
Hospitalist Discharge Summary   Admit Date:  2025 10:12 AM   DC Note date: 2025  Name:  Jo Renee   Age:  84 y.o.  Sex:  female  :  1940   MRN:  [id number]   Room:  [facility]  PCP:  Robert Caban MD    Presenting Complaint: Fatigue     Initial Admission Diagnosis: TIA (transient ischemic attack) [G45.9]  Influenza A [J10.1]  Gait instability [R26.81]  COVID-19 [U07.1]  Atrial fibrillation with RVR (HCC) [I48.91]     Problem List for this Hospitalization (present on admission):    Principal Problem:    TIA (transient ischemic attack)  Active Problems:    Atrial fibrillation with rapid ventricular response (HCC)    SSS (sick sinus syndrome) (HCC)    COVID-19    Influenza A    Atrial fibrillation with RVR (HCC)  Resolved Problems:    * No resolved hospital problems. *      Hospital Course:    Jo Renee is an 84-year-old woman with a history of sick sinus syndrome status post pacemaker, hypothyroidism, and hypertension who presented to the ER with complaints of dizziness and weakness.  She was febrile on presentation to the ED with temperature 100.5.  Testing was positive for COVID-19 and influenza.  Chest x-ray did not show any acute findings.  CT of the head did not show any acute intracranial abnormality and there was no large vessel obstruction on CTA of the head and neck.  This did show scattered atherosclerotic changes with smooth irregularity of the distal cervical segment of the left internal carotid artery which might be related to fibromuscular dysplasia.  A 7 mm calcified nodule in the right upper lung lobe was seen which was similar to prior exam.  Left peritracheal lymphadenopathy was noted to be worse in comparison to CT of 2021, which may be consequent to chronic granulomatous disease.  Routine follow-up chest CT was recommended for complete evaluation.      She was admitted to the Hospitalist service for TIA workup.  Overnight on , she went into atrial    MCH 26.8 27.1   MCHC 32.2 31.7   RDW 15.7* 15.6*    174   MPV 10.3 10.5   NRBC 0.00 0.00   LYMPHOPCT 24.2 29.2   MONOPCT 6.7 13.1*   BASOPCT 0.6 0.3   IMMGRAN 0.6 0.3   LYMPHSABS 0.87 0.85   EOSABS 0.00 0.03   MONOSABS 0.24 0.38   BASOSABS 0.02 0.01   ABSIMMGRAN 0.02 0.01      LFT No results for input(s): \"BILITOT\", \"BILIDIR\", \"ALKPHOS\", \"AST\", \"ALT\", \"PROTEIN\", \"ALBUMIN\", \"GLOB\" in the last 72 hours.   Cardiac  Lab Results   Component Value Date/Time    TROPHS 12.1 12/07/2022 03:32 PM    TROPHS 8.7 07/14/2020 10:36 AM      Coags Lab Results   Component Value Date/Time    PROTIME 16.5 02/09/2025 08:25 PM    INR 1.3 02/09/2025 08:25 PM    APTT 40.2 02/09/2025 08:25 PM      A1c Lab Results   Component Value Date/Time    LABA1C 6.0 02/10/2025 03:42 AM    LABA1C 5.9% 06/10/2020 12:00 AM     02/10/2025 03:42 AM      Lipids Lab Results   Component Value Date/Time    CHOL 99 02/10/2025 03:42 AM    HDL 37 02/10/2025 03:42 AM    CHOLHDLRATIO 2.7 02/10/2025 03:42 AM    TRIG 45 02/10/2025 03:42 AM      Thyroid  No results found for: \"TSHELE\", \"WVO2SHQ\"     Most Recent UA Lab Results   Component Value Date/Time    COLORU YELLOW/STRAW 02/10/2025 04:35 AM    APPEARANCE CLEAR 02/10/2025 04:35 AM    PROTEINU Negative 02/10/2025 04:35 AM    GLUCOSEU Negative 02/10/2025 04:35 AM    KETUA Negative 02/10/2025 04:35 AM    BILIRUBINUR Negative 02/10/2025 04:35 AM    BLOODU Negative 02/10/2025 04:35 AM    UROBILINOGEN 0.2 02/10/2025 04:35 AM    NITRU Negative 02/10/2025 04:35 AM    LEUKOCYTESUR Negative 02/10/2025 04:35 AM        Microbiology:  Results       Procedure Component Value Units Date/Time    C. difficile toxin Molecular [[id number]]  (Abnormal) Collected: 02/11/25 0957    Order Status: Completed Specimen: Stool Updated: 02/11/25 1145     C. difficile toxin Molecular Positive        Comment: This specimen is positive for toxigenic C difficile by DNA amplification.  Repeat testing is not recommended, samples

## 2025-02-13 NOTE — PROGRESS NOTES
[facility] CARDIOLOGY PROGRESS NOTE           2/13/2025 7:09 AM    Admit Date: 2/9/2025      Subjective:     No c/o    Objective:      Vitals:    02/12/25 2340 02/13/25 0016 02/13/25 0430 02/13/25 0504   BP:  (!) 114/50 (!) 127/55    Pulse: 72   60   Resp:  18 18    Temp:  97.9 °F (36.6 °C) 97.7 °F (36.5 °C)    TempSrc:  Oral Oral    SpO2:  92% 91%    Weight:       Height:           Physical Exam:  General-No Acute Distress  Neck- supple, no JVD  CV- regular rate and rhythm no MRG  Lung- clear bilaterally  Abd- soft, nontender, nondistended  Ext- no edema bilaterally.  Skin- warm and dry    Data Review:   Recent Labs     02/11/25  0556 02/11/25  1602 02/12/25  0551 02/13/25  0550     --  136 139   K 3.6  --  3.5 3.3*   MG 1.6*   < > 2.0 1.8   BUN 9  --  11 8   WBC 3.6*  --  2.9*  --    HGB 10.3*  --  10.5*  --    HCT 32.0*  --  33.1*  --      --  174  --     < > = values in this interval not displayed.       Assessment/Plan:     Principal Problem:    TIA (transient ischemic attack)  Plan: Active Problems:    Atrial fibrillation with rapid ventricular response (HCC)  Plan:     SSS (sick sinus syndrome) ([facility])  Plan:     COVID-19  Plan:     Influenza A  ////  Continue Eliquis  Continue sotalol, Qtc OK  Stop aspirin, ? Indication  Stop Crestor, she does not tolerate statin rx  I will fu after discharge       KYAW OBREGON MD  2/13/2025 7:09 AM

## 2025-02-13 NOTE — PROGRESS NOTES
ACUTE PHYSICAL THERAPY GOALS:   (Developed with and agreed upon by patient and/or caregiver.)  Pt will perform supine to/from sit with mod I in 7 treatment days.  Pt will perform sit to/from stand with mod I and LRAD in 7 treatment days.  Pt will ambulate 150 ft with mod I and LRAD in 7 treatment days.  Pt will be independent with HEP in 7 days.    PHYSICAL THERAPY: Daily Note AM   (Link to Caseload Tracking: PT Visit Days : 4  Time In/Out PT Charge Capture  Rehab Caseload Tracker  Orders    Jo Renee is a 84 y.o. female   PRIMARY DIAGNOSIS: TIA (transient ischemic attack)  TIA (transient ischemic attack) [G45.9]  Influenza A [J10.1]  Gait instability [R26.81]  COVID-19 [U07.1]  Atrial fibrillation with RVR (HCC) [I48.91]       Inpatient: Payor: HU[employer]NA MEDICARE / Plan: [employer] GOLD PLUS HMO / Product Type: *No Product type* /     ASSESSMENT:     REHAB RECOMMENDATIONS:   Recommendation to date pending progress:  Setting:  Home Health Therapy    Equipment:    To Be Determined     ASSESSMENT:  Ms. Renee continues to make good progress towards PT goals as noted by increased gait distance, activity/exercise tolerance, and overall improved mobility. Reviewed activity pacing ,energy conservation techniques and pursed lipped breathing with patient who verbalized understanding. Steady progress. Will continue PT efforts.     SUBJECTIVE:   Ms. Renee states, \"I'm getting out of here today\"     Social/Functional Lives With: Spouse  Type of Home: House  Home Layout: One level  Home Access: Ramped entrance  Home Equipment: None  Has the patient had two or more falls in the past year or any fall with injury in the past year?: Yes  Receives Help From: Family  Prior Level of Assist for ADLs: Independent  Prior Level of Assist for Homemaking: Independent  Prior Level of Assist for Ambulation: Independent community ambulator, with or without device  Prior Level of Assist for Transfers: Independent  Active :

## 2025-02-13 NOTE — PLAN OF CARE
Problem: Discharge Planning  Goal: Discharge to home or other facility with appropriate resources  2/13/2025 0212 by Bernice Kasper RN  Outcome: Progressing  2/12/2025 1339 by Henny Vargas RN  Outcome: Progressing  Flowsheets (Taken 2/12/2025 0858)  Discharge to home or other facility with appropriate resources:   Identify barriers to discharge with patient and caregiver   Arrange for needed discharge resources and transportation as appropriate   Identify discharge learning needs (meds, wound care, etc)   Refer to discharge planning if patient needs post-hospital services based on physician order or complex needs related to functional status, cognitive ability or social support system     Problem: Safety - Adult  Goal: Free from fall injury  2/13/2025 0212 by Bernice Kasper RN  Outcome: Progressing  2/12/2025 1339 by Henny Vargas RN  Outcome: Progressing     Problem: ABCDS Injury Assessment  Goal: Absence of physical injury  2/13/2025 0212 by Bernice Kasper RN  Outcome: Progressing  2/12/2025 1339 by Henny Vargas RN  Outcome: Progressing     Problem: Pain  Goal: Verbalizes/displays adequate comfort level or baseline comfort level  2/13/2025 0212 by Bernice Kasper RN  Outcome: Progressing  2/12/2025 1339 by Henny Vargas RN  Outcome: Progressing

## 2025-02-13 NOTE — CARE COORDINATION
02/13/25 1046   Services At/After Discharge   Services At/After Discharge PT;OT;Nursing services;Home Health  ([facility])    Resource Information Provided? No   Mode of Transport at Discharge Other (see comment)  (Spouse to transport by car.)   Confirm Follow Up Transport Family   Condition of Participation: Discharge Planning   The Plan for Transition of Care is related to the following treatment goals: Home with home health   The Patient and/or Patient Representative was provided with a Choice of Provider? Patient   The Patient and/Or Patient Representative agree with the Discharge Plan? Yes   Freedom of Choice list was provided with basic dialogue that supports the patient's individualized plan of care/goals, treatment preferences, and shares the quality data associated with the providers?  Yes     Discharge order placed. Milestones met. CM met with patient and patient verbalizes agreement to discharge. Informed that [facility] will contact her in 24 hrs to schedule first visit. Spouse to transport home.

## 2025-02-13 NOTE — FLOWSHEET NOTE
02/12/25 2123   Treatment Team Notification   Reason for Communication Abnormal vitals  (temp 102.2, tylenol recently given. only on oral vanc for cdiff)   Name of Team Member Notified Teri Reyna NP   Treatment Team Role Advanced Practice Nurse  (hosp)   Method of Communication Secure Message   Response Waiting for response

## 2025-02-14 ENCOUNTER — TELEPHONE (OUTPATIENT)
Age: 85
End: 2025-02-14

## 2025-02-14 ENCOUNTER — TELEPHONE (OUTPATIENT)
Dept: NEUROLOGY | Age: 85
End: 2025-02-14

## 2025-02-14 LAB
BACTERIA SPEC CULT: NORMAL
BACTERIA SPEC CULT: NORMAL
SERVICE CMNT-IMP: NORMAL
SERVICE CMNT-IMP: NORMAL

## 2025-02-14 NOTE — TELEPHONE ENCOUNTER
Called [facility] and spoke with Pharmacist and verified Rx for Eliquis 2.5mg  take 1 tablet twice daily, #180, 3 refills.

## 2025-02-14 NOTE — TELEPHONE ENCOUNTER
Please call [facility] regarding pt Eliquis  RX  They said they need to speak to [phone]

## 2025-02-14 NOTE — TELEPHONE ENCOUNTER
.Care Transitions Initial Follow Up Call    Outreach made within 2 business days of discharge: Yes    Patient: Jo Renee Patient : 1940   MRN: [id number]  Reason for Admission: TIA  Discharge Date: 25       Spoke with: Jo    Discharge department/facility: [facility] Interactive Patient Contact:  Was patient able to fill all prescriptions: Yes  Was patient instructed to bring all medications to the follow-up visit: Yes  Is patient taking all medications as directed in the discharge summary? Yes  Does patient understand their discharge instructions: Yes  Does patient have questions or concerns that need addressed prior to 7-14 day follow up office visit: no    Additional needs identified to be addressed with provider  No needs identified             Scheduled appointment with PCP within 7-14 days    Follow Up  Future Appointments   Date Time Provider Department Center   2025 11:00 AM Fany Lemon APRN [facility]   3/31/2025  9:30 AM Dell Mandel MD [facility]       BONG GOMEZ LPN

## 2025-02-17 NOTE — PROGRESS NOTES
confusion. V. TIA     Continue secondary stroke prevention eliquis.   Goal SBP <140/80  Goal LDL<70  Goal A1C <7.0  Discussed Mediterranean diet and increasing cardiovascular exercise to goal of 30 minutes daily.   Depression screening completed.   Reviewed BE FAST and when to call 911.     PAF (paroxysmal atrial fibrillation) (HCC)  Followed by Cardiology. Rate controlled on BB and currently on OAC.   Cardiac pacemaker    History of COVID-19  Resolved  History of influenza  Resolved  History of Clostridium difficile infection  Resolved.       Follow up in 1 year or sooner if needed      JONATHAN Goodson [facility] [address], [address]  [place] [address]  Phone:[phone]

## 2025-02-18 ENCOUNTER — OFFICE VISIT (OUTPATIENT)
Dept: NEUROLOGY | Age: 85
End: 2025-02-18

## 2025-02-18 VITALS
OXYGEN SATURATION: 94 % | HEART RATE: 66 BPM | DIASTOLIC BLOOD PRESSURE: 74 MMHG | BODY MASS INDEX: 19.31 KG/M2 | SYSTOLIC BLOOD PRESSURE: 134 MMHG | HEIGHT: 59 IN | WEIGHT: 95.8 LBS

## 2025-02-18 DIAGNOSIS — Z86.16 HISTORY OF COVID-19: ICD-10-CM

## 2025-02-18 DIAGNOSIS — I48.0 PAF (PAROXYSMAL ATRIAL FIBRILLATION) (HCC): ICD-10-CM

## 2025-02-18 DIAGNOSIS — R29.90 STROKE-LIKE SYMPTOMS: ICD-10-CM

## 2025-02-18 DIAGNOSIS — Z87.09 HISTORY OF INFLUENZA: ICD-10-CM

## 2025-02-18 DIAGNOSIS — Z95.0 CARDIAC PACEMAKER: ICD-10-CM

## 2025-02-18 DIAGNOSIS — Z86.19 HISTORY OF CLOSTRIDIUM DIFFICILE INFECTION: ICD-10-CM

## 2025-02-18 DIAGNOSIS — Z09 HOSPITAL DISCHARGE FOLLOW-UP: Primary | ICD-10-CM

## 2025-02-18 PROBLEM — I48.91 ATRIAL FIBRILLATION WITH RAPID VENTRICULAR RESPONSE (HCC): Status: RESOLVED | Noted: 2020-07-14 | Resolved: 2025-02-18

## 2025-02-18 PROBLEM — I48.91 ATRIAL FIBRILLATION WITH RVR (HCC): Status: RESOLVED | Noted: 2025-02-11 | Resolved: 2025-02-18

## 2025-02-18 ASSESSMENT — PATIENT HEALTH QUESTIONNAIRE - PHQ9
SUM OF ALL RESPONSES TO PHQ QUESTIONS 1-9: 0
1. LITTLE INTEREST OR PLEASURE IN DOING THINGS: NOT AT ALL
SUM OF ALL RESPONSES TO PHQ QUESTIONS 1-9: 0
SUM OF ALL RESPONSES TO PHQ9 QUESTIONS 1 & 2: 0
SUM OF ALL RESPONSES TO PHQ QUESTIONS 1-9: 0
SUM OF ALL RESPONSES TO PHQ QUESTIONS 1-9: 0
2. FEELING DOWN, DEPRESSED OR HOPELESS: NOT AT ALL

## 2025-02-18 ASSESSMENT — ENCOUNTER SYMPTOMS
RESPIRATORY NEGATIVE: 1
EYES NEGATIVE: 1
ALLERGIC/IMMUNOLOGIC NEGATIVE: 1
GASTROINTESTINAL NEGATIVE: 1

## 2025-03-12 PROBLEM — J10.1 INFLUENZA A: Status: RESOLVED | Noted: 2025-02-10 | Resolved: 2025-03-12

## 2025-03-26 NOTE — PROGRESS NOTES
[facility] CARDIOLOGY  [address] [place] [address], [address]  [place] [address]  PHONE: [phone]        25        NAME:  Jo Renee  : 1940  MRN: [id number]     Paf  Presence of a pacer, sub pectoral   Tia 2025    CHIEF COMPLAINT:    Atrial Fibrillation      SUBJECTIVE:     Not well.  Losing weight.  On no meds x 4 days.       Medications were all reviewed with the patient today and updated as necessary.   Current Outpatient Medications   Medication Sig    metoprolol tartrate (LOPRESSOR) 25 MG tablet Take 2 tablets by mouth daily as needed (Patient not taking: Reported on 3/31/2025)    apixaban (ELIQUIS) 2.5 MG TABS tablet Take 1 tablet by mouth 2 times daily (Patient not taking: Reported on 3/31/2025)    sotalol (BETAPACE) 80 MG tablet Take 0.5 tablets by mouth every 12 hours (Patient not taking: Reported on 3/31/2025)    amitriptyline (ELAVIL) 25 MG tablet Take 1 tablet by mouth nightly (Patient not taking: Reported on 3/31/2025)    pantoprazole (PROTONIX) 40 MG tablet Take 1 tablet by mouth every morning (before breakfast) (Patient not taking: Reported on 3/31/2025)    Cholecalciferol (VITAMIN D3) 125 MCG (5000 UT) TABS Take by mouth daily Unsure of mcg, takes daily in am (Patient not taking: Reported on 3/31/2025)    melatonin 5 MG TABS tablet Take 1 tablet by mouth nightly     No current facility-administered medications for this visit.        Allergies   Allergen Reactions    Rivaroxaban Other (See Comments)     Other Reaction(s): Unknown           PHYSICAL EXAM:     Wt Readings from Last 3 Encounters:   25 42.5 kg (93 lb 11.2 oz)   25 43.5 kg (95 lb 12.8 oz)   25 43.2 kg (95 lb 4.8 oz)     BP Readings from Last 3 Encounters:   25 116/74   25 134/74   25 115/74       /74   Pulse 86   Ht 1.499 m (4' 11\")   Wt 42.5 kg (93 lb 11.2 oz)   BMI 18.93 kg/m²     Physical Exam  Vitals reviewed.   HENT:      Head: Normocephalic and

## 2025-03-31 ENCOUNTER — OFFICE VISIT (OUTPATIENT)
Age: 85
End: 2025-03-31
Payer: MEDICARE

## 2025-03-31 VITALS
HEIGHT: 59 IN | DIASTOLIC BLOOD PRESSURE: 74 MMHG | BODY MASS INDEX: 18.89 KG/M2 | WEIGHT: 93.7 LBS | HEART RATE: 86 BPM | SYSTOLIC BLOOD PRESSURE: 116 MMHG

## 2025-03-31 DIAGNOSIS — Z95.0 PRESENCE OF CARDIAC PACEMAKER: ICD-10-CM

## 2025-03-31 DIAGNOSIS — I48.0 PAF (PAROXYSMAL ATRIAL FIBRILLATION) (HCC): Primary | ICD-10-CM

## 2025-03-31 PROCEDURE — 99214 OFFICE O/P EST MOD 30 MIN: CPT | Performed by: INTERNAL MEDICINE

## 2025-03-31 PROCEDURE — 1036F TOBACCO NON-USER: CPT | Performed by: INTERNAL MEDICINE

## 2025-03-31 PROCEDURE — 1090F PRES/ABSN URINE INCON ASSESS: CPT | Performed by: INTERNAL MEDICINE

## 2025-03-31 PROCEDURE — G8399 PT W/DXA RESULTS DOCUMENT: HCPCS | Performed by: INTERNAL MEDICINE

## 2025-03-31 PROCEDURE — G8428 CUR MEDS NOT DOCUMENT: HCPCS | Performed by: INTERNAL MEDICINE

## 2025-03-31 PROCEDURE — G8420 CALC BMI NORM PARAMETERS: HCPCS | Performed by: INTERNAL MEDICINE

## 2025-03-31 PROCEDURE — 1123F ACP DISCUSS/DSCN MKR DOCD: CPT | Performed by: INTERNAL MEDICINE

## 2025-03-31 RX ORDER — METOPROLOL TARTRATE 25 MG/1
50 TABLET, FILM COATED ORAL DAILY PRN
COMMUNITY

## (undated) DEVICE — BRONCHOSCOPY PACK: Brand: MEDLINE INDUSTRIES, INC.

## (undated) DEVICE — SINGLE USE BIOPSY VALVE MAJ-210: Brand: SINGLE USE BIOPSY VALVE (STERILE)

## (undated) DEVICE — SINGLE USE SUCTION VALVE MAJ-209: Brand: SINGLE USE SUCTION VALVE (STERILE)

## (undated) DEVICE — KENDALL RADIOLUCENT FOAM MONITORING ELECTRODE RECTANGULAR SHAPE: Brand: KENDALL